# Patient Record
Sex: FEMALE | Race: WHITE | Employment: OTHER | URBAN - NONMETROPOLITAN AREA
[De-identification: names, ages, dates, MRNs, and addresses within clinical notes are randomized per-mention and may not be internally consistent; named-entity substitution may affect disease eponyms.]

---

## 2017-07-24 ENCOUNTER — OFFICE VISIT (OUTPATIENT)
Dept: INTERNAL MEDICINE CLINIC | Age: 77
End: 2017-07-24
Payer: COMMERCIAL

## 2017-07-24 ENCOUNTER — HOSPITAL ENCOUNTER (OUTPATIENT)
Age: 77
Discharge: HOME | End: 2017-07-24
Payer: COMMERCIAL

## 2017-07-24 VITALS
BODY MASS INDEX: 52.04 KG/M2 | WEIGHT: 282.8 LBS | SYSTOLIC BLOOD PRESSURE: 182 MMHG | DIASTOLIC BLOOD PRESSURE: 124 MMHG | HEART RATE: 72 BPM | HEIGHT: 62 IN

## 2017-07-24 DIAGNOSIS — Z79.4 TYPE 2 DIABETES MELLITUS WITH HYPERGLYCEMIA, WITH LONG-TERM CURRENT USE OF INSULIN (HCC): ICD-10-CM

## 2017-07-24 DIAGNOSIS — E11.65 TYPE 2 DIABETES MELLITUS WITH HYPERGLYCEMIA, WITH LONG-TERM CURRENT USE OF INSULIN (HCC): ICD-10-CM

## 2017-07-24 DIAGNOSIS — I25.10 CORONARY ARTERY DISEASE INVOLVING NATIVE CORONARY ARTERY OF NATIVE HEART WITHOUT ANGINA PECTORIS: ICD-10-CM

## 2017-07-24 DIAGNOSIS — N30.00 ACUTE CYSTITIS WITHOUT HEMATURIA: ICD-10-CM

## 2017-07-24 DIAGNOSIS — Z01.818 PREOP EXAM FOR INTERNAL MEDICINE: Primary | ICD-10-CM

## 2017-07-24 DIAGNOSIS — E66.01 MORBID OBESITY DUE TO EXCESS CALORIES (HCC): ICD-10-CM

## 2017-07-24 DIAGNOSIS — M17.11 PRIMARY OSTEOARTHRITIS OF RIGHT KNEE: ICD-10-CM

## 2017-07-24 DIAGNOSIS — E11.22 CKD STAGE 2 DUE TO TYPE 2 DIABETES MELLITUS (HCC): ICD-10-CM

## 2017-07-24 DIAGNOSIS — E78.00 PURE HYPERCHOLESTEROLEMIA: ICD-10-CM

## 2017-07-24 DIAGNOSIS — N18.2 CKD STAGE 2 DUE TO TYPE 2 DIABETES MELLITUS (HCC): ICD-10-CM

## 2017-07-24 DIAGNOSIS — I10 ESSENTIAL HYPERTENSION: ICD-10-CM

## 2017-07-24 LAB
AVERAGE GLUCOSE: 306 MG/DL (ref 70–126)
HBA1C MFR BLD: 12.2 % (ref 4.4–6.4)

## 2017-07-24 PROCEDURE — 36415 COLL VENOUS BLD VENIPUNCTURE: CPT

## 2017-07-24 PROCEDURE — 99244 OFF/OP CNSLTJ NEW/EST MOD 40: CPT | Performed by: INTERNAL MEDICINE

## 2017-07-24 PROCEDURE — 83036 HEMOGLOBIN GLYCOSYLATED A1C: CPT

## 2017-07-24 RX ORDER — SOLIFENACIN SUCCINATE 10 MG/1
10 TABLET, FILM COATED ORAL DAILY
COMMUNITY
End: 2017-11-10 | Stop reason: SDUPTHER

## 2017-07-24 RX ORDER — METOPROLOL SUCCINATE 100 MG/1
100 TABLET, EXTENDED RELEASE ORAL DAILY
COMMUNITY
End: 2017-10-30 | Stop reason: SDUPTHER

## 2017-07-24 RX ORDER — SULFAMETHOXAZOLE AND TRIMETHOPRIM 800; 160 MG/1; MG/1
1 TABLET ORAL 2 TIMES DAILY
Qty: 14 TABLET | Refills: 0 | Status: SHIPPED | OUTPATIENT
Start: 2017-07-24 | End: 2017-08-24 | Stop reason: ALTCHOICE

## 2017-07-24 ASSESSMENT — PATIENT HEALTH QUESTIONNAIRE - PHQ9
SUM OF ALL RESPONSES TO PHQ9 QUESTIONS 1 & 2: 0
1. LITTLE INTEREST OR PLEASURE IN DOING THINGS: 0
SUM OF ALL RESPONSES TO PHQ QUESTIONS 1-9: 0
2. FEELING DOWN, DEPRESSED OR HOPELESS: 0

## 2017-07-25 ENCOUNTER — TELEPHONE (OUTPATIENT)
Dept: INTERNAL MEDICINE CLINIC | Age: 77
End: 2017-07-25

## 2017-07-25 RX ORDER — LISINOPRIL 10 MG/1
10 TABLET ORAL DAILY
Qty: 30 TABLET | Refills: 3 | Status: SHIPPED | OUTPATIENT
Start: 2017-07-25 | End: 2017-08-07

## 2017-07-25 RX ORDER — BLOOD-GLUCOSE METER
KIT MISCELLANEOUS
Qty: 1 KIT | Refills: 0 | Status: SHIPPED | OUTPATIENT
Start: 2017-07-25 | End: 2018-03-16

## 2017-07-27 DIAGNOSIS — Z79.4 TYPE 2 DIABETES MELLITUS WITH HYPERGLYCEMIA, WITH LONG-TERM CURRENT USE OF INSULIN (HCC): Primary | ICD-10-CM

## 2017-07-27 DIAGNOSIS — E11.65 TYPE 2 DIABETES MELLITUS WITH HYPERGLYCEMIA, WITH LONG-TERM CURRENT USE OF INSULIN (HCC): Primary | ICD-10-CM

## 2017-07-27 RX ORDER — LANCETS 30 GAUGE
EACH MISCELLANEOUS
Qty: 100 EACH | Refills: 3 | Status: SHIPPED | OUTPATIENT
Start: 2017-07-27 | End: 2018-03-16

## 2017-07-27 RX ORDER — GLUCOSAMINE HCL/CHONDROITIN SU 500-400 MG
CAPSULE ORAL
Qty: 100 STRIP | Refills: 3 | Status: SHIPPED | OUTPATIENT
Start: 2017-07-27 | End: 2017-10-12 | Stop reason: SDUPTHER

## 2017-07-28 RX ORDER — IBUPROFEN 200 MG
1 TABLET ORAL DAILY
Qty: 100 EACH | Refills: 5 | Status: SHIPPED | OUTPATIENT
Start: 2017-07-28 | End: 2018-03-16

## 2017-08-07 ENCOUNTER — TELEPHONE (OUTPATIENT)
Dept: INTERNAL MEDICINE CLINIC | Age: 77
End: 2017-08-07

## 2017-08-07 RX ORDER — LISINOPRIL 40 MG/1
40 TABLET ORAL DAILY
Qty: 30 TABLET | Refills: 3 | OUTPATIENT
Start: 2017-08-07 | End: 2017-09-19 | Stop reason: SDUPTHER

## 2017-08-14 ENCOUNTER — TELEPHONE (OUTPATIENT)
Dept: INTERNAL MEDICINE CLINIC | Age: 77
End: 2017-08-14

## 2017-08-14 DIAGNOSIS — I10 ESSENTIAL HYPERTENSION: Primary | ICD-10-CM

## 2017-08-14 DIAGNOSIS — Z79.4 TYPE 2 DIABETES MELLITUS WITH HYPERGLYCEMIA, WITH LONG-TERM CURRENT USE OF INSULIN (HCC): ICD-10-CM

## 2017-08-14 DIAGNOSIS — E11.65 TYPE 2 DIABETES MELLITUS WITH HYPERGLYCEMIA, WITH LONG-TERM CURRENT USE OF INSULIN (HCC): ICD-10-CM

## 2017-08-14 DIAGNOSIS — I10 ESSENTIAL HYPERTENSION: ICD-10-CM

## 2017-08-14 RX ORDER — AMLODIPINE BESYLATE 5 MG/1
5 TABLET ORAL DAILY
Qty: 30 TABLET | Refills: 3 | Status: SHIPPED | OUTPATIENT
Start: 2017-08-14 | End: 2017-08-14 | Stop reason: SDUPTHER

## 2017-08-14 RX ORDER — AMLODIPINE BESYLATE 5 MG/1
5 TABLET ORAL DAILY
Qty: 30 TABLET | Refills: 3 | OUTPATIENT
Start: 2017-08-14 | End: 2017-09-01 | Stop reason: DRUGHIGH

## 2017-08-24 ENCOUNTER — OFFICE VISIT (OUTPATIENT)
Dept: INTERNAL MEDICINE CLINIC | Age: 77
End: 2017-08-24
Payer: COMMERCIAL

## 2017-08-24 VITALS
BODY MASS INDEX: 51.89 KG/M2 | WEIGHT: 282 LBS | SYSTOLIC BLOOD PRESSURE: 154 MMHG | HEART RATE: 60 BPM | HEIGHT: 62 IN | DIASTOLIC BLOOD PRESSURE: 84 MMHG

## 2017-08-24 DIAGNOSIS — N18.2 CKD STAGE 2 DUE TO TYPE 2 DIABETES MELLITUS (HCC): ICD-10-CM

## 2017-08-24 DIAGNOSIS — E11.65 TYPE 2 DIABETES MELLITUS WITH HYPERGLYCEMIA, WITH LONG-TERM CURRENT USE OF INSULIN (HCC): ICD-10-CM

## 2017-08-24 DIAGNOSIS — I10 ESSENTIAL HYPERTENSION: Primary | ICD-10-CM

## 2017-08-24 DIAGNOSIS — E66.01 MORBID OBESITY DUE TO EXCESS CALORIES (HCC): ICD-10-CM

## 2017-08-24 DIAGNOSIS — E78.2 MIXED HYPERLIPIDEMIA: ICD-10-CM

## 2017-08-24 DIAGNOSIS — Z23 NEED FOR PROPHYLACTIC VACCINATION AGAINST STREPTOCOCCUS PNEUMONIAE (PNEUMOCOCCUS): ICD-10-CM

## 2017-08-24 DIAGNOSIS — Z79.4 TYPE 2 DIABETES MELLITUS WITH HYPERGLYCEMIA, WITH LONG-TERM CURRENT USE OF INSULIN (HCC): ICD-10-CM

## 2017-08-24 DIAGNOSIS — E11.22 CKD STAGE 2 DUE TO TYPE 2 DIABETES MELLITUS (HCC): ICD-10-CM

## 2017-08-24 PROCEDURE — 99214 OFFICE O/P EST MOD 30 MIN: CPT | Performed by: INTERNAL MEDICINE

## 2017-08-24 PROCEDURE — 90670 PCV13 VACCINE IM: CPT | Performed by: INTERNAL MEDICINE

## 2017-08-24 PROCEDURE — 90471 IMMUNIZATION ADMIN: CPT | Performed by: INTERNAL MEDICINE

## 2017-08-31 ENCOUNTER — TELEPHONE (OUTPATIENT)
Dept: INTERNAL MEDICINE CLINIC | Age: 77
End: 2017-08-31

## 2017-09-01 RX ORDER — AMLODIPINE BESYLATE 10 MG/1
10 TABLET ORAL DAILY
Qty: 30 TABLET | Refills: 3 | Status: SHIPPED | OUTPATIENT
Start: 2017-09-01 | End: 2017-09-05 | Stop reason: SDUPTHER

## 2017-09-05 RX ORDER — AMLODIPINE BESYLATE 10 MG/1
10 TABLET ORAL DAILY
Qty: 30 TABLET | Refills: 3 | OUTPATIENT
Start: 2017-09-05 | End: 2017-09-19 | Stop reason: SDUPTHER

## 2017-09-19 ENCOUNTER — OFFICE VISIT (OUTPATIENT)
Dept: INTERNAL MEDICINE CLINIC | Age: 77
End: 2017-09-19
Payer: COMMERCIAL

## 2017-09-19 VITALS
SYSTOLIC BLOOD PRESSURE: 104 MMHG | DIASTOLIC BLOOD PRESSURE: 60 MMHG | WEIGHT: 266.6 LBS | BODY MASS INDEX: 49.06 KG/M2 | HEIGHT: 62 IN | HEART RATE: 64 BPM

## 2017-09-19 DIAGNOSIS — E11.65 TYPE 2 DIABETES MELLITUS WITH HYPERGLYCEMIA, WITH LONG-TERM CURRENT USE OF INSULIN (HCC): ICD-10-CM

## 2017-09-19 DIAGNOSIS — E78.00 PURE HYPERCHOLESTEROLEMIA: ICD-10-CM

## 2017-09-19 DIAGNOSIS — I10 ESSENTIAL HYPERTENSION: Primary | ICD-10-CM

## 2017-09-19 DIAGNOSIS — F02.80 EARLY ONSET ALZHEIMER'S DEMENTIA WITHOUT BEHAVIORAL DISTURBANCE (HCC): ICD-10-CM

## 2017-09-19 DIAGNOSIS — Z23 NEED FOR INFLUENZA VACCINATION: ICD-10-CM

## 2017-09-19 DIAGNOSIS — Z79.4 TYPE 2 DIABETES MELLITUS WITH HYPERGLYCEMIA, WITH LONG-TERM CURRENT USE OF INSULIN (HCC): ICD-10-CM

## 2017-09-19 DIAGNOSIS — G30.0 EARLY ONSET ALZHEIMER'S DEMENTIA WITHOUT BEHAVIORAL DISTURBANCE (HCC): ICD-10-CM

## 2017-09-19 DIAGNOSIS — Z87.440 HISTORY OF UTI: ICD-10-CM

## 2017-09-19 PROCEDURE — 90471 IMMUNIZATION ADMIN: CPT | Performed by: INTERNAL MEDICINE

## 2017-09-19 PROCEDURE — 90662 IIV NO PRSV INCREASED AG IM: CPT | Performed by: INTERNAL MEDICINE

## 2017-09-19 PROCEDURE — 99214 OFFICE O/P EST MOD 30 MIN: CPT | Performed by: INTERNAL MEDICINE

## 2017-09-19 RX ORDER — AMLODIPINE BESYLATE 10 MG/1
10 TABLET ORAL DAILY
Qty: 90 TABLET | Refills: 1 | Status: ON HOLD | OUTPATIENT
Start: 2017-09-19 | End: 2022-10-26

## 2017-09-19 RX ORDER — LISINOPRIL 40 MG/1
40 TABLET ORAL DAILY
Qty: 90 TABLET | Refills: 1 | Status: SHIPPED | OUTPATIENT
Start: 2017-09-19 | End: 2018-03-08 | Stop reason: ALTCHOICE

## 2017-09-19 RX ORDER — ATORVASTATIN CALCIUM 40 MG/1
40 TABLET, FILM COATED ORAL DAILY
Qty: 30 TABLET | Refills: 3 | Status: SHIPPED | OUTPATIENT
Start: 2017-09-19 | End: 2017-10-20 | Stop reason: SDUPTHER

## 2017-09-30 PROBLEM — G30.0 EARLY ONSET ALZHEIMER'S DEMENTIA WITHOUT BEHAVIORAL DISTURBANCE (HCC): Status: ACTIVE | Noted: 2017-09-30

## 2017-09-30 PROBLEM — F02.80 EARLY ONSET ALZHEIMER'S DEMENTIA WITHOUT BEHAVIORAL DISTURBANCE (HCC): Status: ACTIVE | Noted: 2017-09-30

## 2017-10-12 DIAGNOSIS — Z79.4 TYPE 2 DIABETES MELLITUS WITH HYPERGLYCEMIA, WITH LONG-TERM CURRENT USE OF INSULIN (HCC): ICD-10-CM

## 2017-10-12 DIAGNOSIS — E11.65 TYPE 2 DIABETES MELLITUS WITH HYPERGLYCEMIA, WITH LONG-TERM CURRENT USE OF INSULIN (HCC): ICD-10-CM

## 2017-10-13 RX ORDER — GLUCOSAMINE HCL/CHONDROITIN SU 500-400 MG
CAPSULE ORAL
Qty: 300 STRIP | Refills: 1 | Status: SHIPPED | OUTPATIENT
Start: 2017-10-13 | End: 2018-03-16

## 2017-10-20 DIAGNOSIS — E78.00 PURE HYPERCHOLESTEROLEMIA: ICD-10-CM

## 2017-10-21 RX ORDER — ATORVASTATIN CALCIUM 40 MG/1
40 TABLET, FILM COATED ORAL DAILY
Qty: 90 TABLET | Refills: 1 | Status: SHIPPED | OUTPATIENT
Start: 2017-10-21

## 2017-10-30 RX ORDER — METOPROLOL SUCCINATE 100 MG/1
100 TABLET, EXTENDED RELEASE ORAL DAILY
Qty: 90 TABLET | Refills: 1 | Status: SHIPPED | OUTPATIENT
Start: 2017-10-30 | End: 2018-07-17 | Stop reason: DRUGHIGH

## 2017-11-09 ENCOUNTER — TELEPHONE (OUTPATIENT)
Dept: INTERNAL MEDICINE CLINIC | Age: 77
End: 2017-11-09

## 2017-11-09 NOTE — TELEPHONE ENCOUNTER
Val Guillaume called wanting to know if pt needs to continue the Vesicare-originally ordered by Dr. Henrique Englihs. He states he doesn't feel it does what its suppose to do as she doesn't hold her urine. Will you refill this or recommend something different.

## 2017-11-10 RX ORDER — SOLIFENACIN SUCCINATE 10 MG/1
10 TABLET, FILM COATED ORAL DAILY
Qty: 90 TABLET | Refills: 1 | Status: ON HOLD | OUTPATIENT
Start: 2017-11-10 | End: 2022-10-26

## 2017-11-14 ENCOUNTER — TELEPHONE (OUTPATIENT)
Dept: INTERNAL MEDICINE CLINIC | Age: 77
End: 2017-11-14

## 2017-11-14 NOTE — TELEPHONE ENCOUNTER
informed of UTI and that Dr. Ciera Chow has ordered Bactrim DS to be taken 2 times per day. She is to take one today and then can take 1 in the am with just a sip of water before going to [facility]. Instructed him to bring this medicine along also tomorrow to [facility]. He verbalized understanding.

## 2017-11-14 NOTE — TELEPHONE ENCOUNTER
Constance Bonilla called and stated that Dr. Devora Jean Baptiste is good with pt just starting the tx for the UTI and to have pt bring the antibiotic with her when she comes in the am.

## 2017-11-16 RX ORDER — SULFAMETHOXAZOLE AND TRIMETHOPRIM 800; 160 MG/1; MG/1
1 TABLET ORAL 2 TIMES DAILY
Qty: 28 TABLET | Refills: 0 | OUTPATIENT
Start: 2017-11-16 | End: 2017-11-30

## 2017-12-04 ENCOUNTER — TELEPHONE (OUTPATIENT)
Dept: INTERNAL MEDICINE CLINIC | Age: 77
End: 2017-12-04

## 2017-12-04 NOTE — TELEPHONE ENCOUNTER
Discussed with Gloria Barboza - she advised patient's  to try to use just Tylenol for pain if possible.

## 2017-12-04 NOTE — TELEPHONE ENCOUNTER
Discussed this with Dr. Ciera Chow and informed  to maybe try to wean her off the oxycontin and use Tylenol not Excedrin for the pain and see how she does, but if she has another episode she needs to go to the ER to be evaluated. He verbalized understanding.

## 2017-12-07 RX ORDER — AMLODIPINE BESYLATE 10 MG/1
10 TABLET ORAL DAILY
Qty: 90 TABLET | Refills: 1 | OUTPATIENT
Start: 2017-12-07

## 2017-12-07 RX ORDER — AMLODIPINE BESYLATE 5 MG/1
5 TABLET ORAL DAILY
Qty: 90 TABLET | Refills: 1 | Status: CANCELLED | OUTPATIENT
Start: 2017-12-07

## 2017-12-07 NOTE — TELEPHONE ENCOUNTER
Verified with  that she is taking amlodipine 10 mg daily. He also mentions that she is feeling much better since stopping the pain med and is using Tylenol 500 mg 3 times daily. She is doing therapy again.

## 2017-12-13 ENCOUNTER — TELEPHONE (OUTPATIENT)
Dept: INTERNAL MEDICINE CLINIC | Age: 77
End: 2017-12-13

## 2017-12-14 ENCOUNTER — OFFICE VISIT (OUTPATIENT)
Dept: INTERNAL MEDICINE CLINIC | Age: 77
End: 2017-12-14
Payer: COMMERCIAL

## 2017-12-14 ENCOUNTER — HOSPITAL ENCOUNTER (OUTPATIENT)
Age: 77
Discharge: HOME | End: 2017-12-14
Payer: COMMERCIAL

## 2017-12-14 VITALS
HEART RATE: 70 BPM | DIASTOLIC BLOOD PRESSURE: 68 MMHG | HEIGHT: 62 IN | SYSTOLIC BLOOD PRESSURE: 130 MMHG | BODY MASS INDEX: 45.82 KG/M2 | WEIGHT: 249 LBS

## 2017-12-14 DIAGNOSIS — E11.65 TYPE 2 DIABETES MELLITUS WITH HYPERGLYCEMIA, WITH LONG-TERM CURRENT USE OF INSULIN (HCC): ICD-10-CM

## 2017-12-14 DIAGNOSIS — I10 ESSENTIAL HYPERTENSION: ICD-10-CM

## 2017-12-14 DIAGNOSIS — Z79.4 TYPE 2 DIABETES MELLITUS WITH HYPERGLYCEMIA, WITH LONG-TERM CURRENT USE OF INSULIN (HCC): ICD-10-CM

## 2017-12-14 DIAGNOSIS — M25.561 ACUTE PAIN OF RIGHT KNEE: Primary | ICD-10-CM

## 2017-12-14 DIAGNOSIS — E78.00 PURE HYPERCHOLESTEROLEMIA: ICD-10-CM

## 2017-12-14 DIAGNOSIS — R82.90 FOUL SMELLING URINE: ICD-10-CM

## 2017-12-14 DIAGNOSIS — F32.89 OTHER DEPRESSION: ICD-10-CM

## 2017-12-14 LAB
ALBUMIN SERPL-MCNC: 4.3 G/DL (ref 3.5–5.1)
ALP BLD-CCNC: 68 U/L (ref 38–126)
ALT SERPL-CCNC: 12 U/L (ref 11–66)
ANION GAP SERPL CALCULATED.3IONS-SCNC: 16 MEQ/L (ref 8–16)
AST SERPL-CCNC: 13 U/L (ref 5–40)
AVERAGE GLUCOSE: 120 MG/DL (ref 70–126)
BILIRUB SERPL-MCNC: 0.9 MG/DL (ref 0.3–1.2)
BUN BLDV-MCNC: 18 MG/DL (ref 7–22)
CALCIUM SERPL-MCNC: 9.6 MG/DL (ref 8.5–10.5)
CHLORIDE BLD-SCNC: 104 MEQ/L (ref 98–111)
CO2: 23 MEQ/L (ref 23–33)
CREAT SERPL-MCNC: 1 MG/DL (ref 0.4–1.2)
GFR SERPL CREATININE-BSD FRML MDRD: 54 ML/MIN/1.73M2
GLUCOSE BLD-MCNC: 173 MG/DL (ref 70–108)
HBA1C MFR BLD: 6 % (ref 4.4–6.4)
LDL CHOLESTEROL DIRECT: 70.13 MG/DL
POTASSIUM SERPL-SCNC: 4.4 MEQ/L (ref 3.5–5.2)
SODIUM BLD-SCNC: 143 MEQ/L (ref 135–145)
TOTAL PROTEIN: 7.3 G/DL (ref 6.1–8)

## 2017-12-14 PROCEDURE — 36415 COLL VENOUS BLD VENIPUNCTURE: CPT

## 2017-12-14 PROCEDURE — 83036 HEMOGLOBIN GLYCOSYLATED A1C: CPT

## 2017-12-14 PROCEDURE — 83721 ASSAY OF BLOOD LIPOPROTEIN: CPT

## 2017-12-14 PROCEDURE — 80053 COMPREHEN METABOLIC PANEL: CPT

## 2017-12-14 PROCEDURE — 99213 OFFICE O/P EST LOW 20 MIN: CPT | Performed by: INTERNAL MEDICINE

## 2017-12-14 RX ORDER — ACETAMINOPHEN 500 MG
500 TABLET ORAL EVERY 6 HOURS PRN
Status: ON HOLD | COMMUNITY
End: 2022-10-26

## 2017-12-14 RX ORDER — SERTRALINE HYDROCHLORIDE 25 MG/1
25 TABLET, FILM COATED ORAL DAILY
Qty: 30 TABLET | Refills: 3 | Status: SHIPPED | OUTPATIENT
Start: 2017-12-14 | End: 2018-01-06

## 2017-12-14 NOTE — PROGRESS NOTES
1940    Chief Complaint   Patient presents with    Other     pt thinks she is here for check up- called in yesterday saying pt just wants to lay in bed all day and this week she begged him not to take her to therapy and cried. Pt is a 78 y.o. female had right total knee arthroplasty with Dr. Sathish Rosales. She is at home with  and has baseline dementia and very difficult for  to deal with as she does not like to be told what to do. She is here today as pain issues and  reports she has been refusing to go to PT - consider depression, pain. She is having side effects with oxycodone and changed to Tylenol but only take 3 pills a day - increase to 2 pills every 6 hours and see if better pain control. She has lack of motivation, states she is depressed. Also with dementia. She is very aggitated with  \"bossing her around\". Not making it to PT, getting out of bed is a struggle. Will try low dose Zoloft. Encouraged patient to view  as her assistant/helper, he only wants what is best for her. She will give him a nasty look whenever he suggests things at the visit, can only imagine how difficult he has it at home    Strong smell of urine in room - check UA to rule out UTI. She has had 2 UTI's in the past several months. Past Medical History:   Diagnosis Date    Arthritis     CAD (coronary artery disease)     s/p MI, CABG 2 vessels 1994    CKD stage 2 due to type 2 diabetes mellitus (HCC)     secondary to obesity, aging, and DM    DM (diabetes mellitus) ([address]     DM retinopathy ([address]     mild    HTN (hypertension)     variable, improved by chaning her medication raya.     Hyperlipidemia 1994    Morbid obesity ([address]     OAB (overactive bladder)     chronic episodes of incontinence per     UTI (urinary tract infection) 07/2017    preop       Past Surgical History:   Procedure Laterality Date    CORONARY ARTERY BYPASS GRAFT  1994    s/p two-vessel  HYSTERECTOMY  1994    KNEE ARTHROSCOPY Right ? Dr. Oscar Arriola    ovarian       Current Outpatient Prescriptions   Medication Sig Dispense Refill    acetaminophen (TYLENOL) 500 MG tablet Take 500 mg by mouth every 6 hours as needed for Pain      sertraline (ZOLOFT) 25 MG tablet Take 1 tablet by mouth daily 30 tablet 3    solifenacin (VESICARE) 10 MG tablet Take 1 tablet by mouth daily 90 tablet 1    metoprolol succinate (TOPROL XL) 100 MG extended release tablet Take 1 tablet by mouth daily 90 tablet 1    atorvastatin (LIPITOR) 40 MG tablet Take 1 tablet by mouth daily 90 tablet 1    Glucose Blood (BLOOD GLUCOSE TEST STRIPS) STRP One Touch Ultra Mini - Test blood glucose 3 times per day and as needed. Dx E11.65 300 strip 1    Dulaglutide (TRULICITY) 3.06 IV/5.1TX SOPN Inject 0.75 mg into the skin once a week 4 Pen 5    lisinopril (PRINIVIL;ZESTRIL) 40 MG tablet Take 1 tablet by mouth daily 90 tablet 1    metFORMIN (GLUCOPHAGE) 500 MG tablet Take 1 tablet by mouth 2 times daily (with meals) 180 tablet 1    amLODIPine (NORVASC) 10 MG tablet Take 1 tablet by mouth daily 90 tablet 1    INSULIN SYRINGE 1CC/29G 29G X 1/2\" 1 ML MISC 1 each by Does not apply route daily 100 each 5    Lancets MISC Ultra Mini One Touch - Test glucose 3 times per day and as needed. DX E11.65 100 each 3    Blood Glucose Monitoring Suppl (ONE TOUCH ULTRA MINI) w/Device KIT Test blood sugars three times daily . DX:E11.9 patient is on insulin 1 kit 0    insulin detemir (LEVEMIR) 100 UNIT/ML injection vial Inject 45 Units into the skin nightly       aspirin 325 MG EC tablet Take 325 mg by mouth daily        No current facility-administered medications for this visit. No Known Allergies    Social History     Social History    Marital status:      Spouse name: N/A    Number of children: N/A    Years of education: N/A     Occupational History    Not on file.

## 2017-12-15 ENCOUNTER — HOSPITAL ENCOUNTER (OUTPATIENT)
Age: 77
Discharge: HOME | End: 2017-12-15
Payer: COMMERCIAL

## 2017-12-15 LAB
BACTERIA: ABNORMAL /HPF
BILIRUBIN URINE: ABNORMAL
BLOOD, URINE: ABNORMAL
CASTS 2: ABNORMAL /LPF
CASTS UA: ABNORMAL /LPF
CHARACTER, URINE: ABNORMAL
COLOR: ABNORMAL
CREATININE, URINE: 230.6 MG/DL
CRYSTALS, UA: ABNORMAL
EPITHELIAL CELLS, UA: ABNORMAL /HPF
GLUCOSE URINE: NEGATIVE MG/DL
ICTOTEST: NEGATIVE
KETONES, URINE: NEGATIVE
LEUKOCYTE ESTERASE, URINE: ABNORMAL
MICROALBUMIN UR-MCNC: 20.57 MG/DL
MICROALBUMIN/CREAT UR-RTO: 89 MG/G (ref 0–30)
MISCELLANEOUS 2: ABNORMAL
NITRITE, URINE: POSITIVE
PH UA: 5
PROTEIN UA: 100
RBC URINE: ABNORMAL /HPF
RENAL EPITHELIAL, UA: ABNORMAL
SPECIFIC GRAVITY, URINE: 1.03 (ref 1–1.03)
UROBILINOGEN, URINE: 0.2 EU/DL
WBC UA: ABNORMAL /HPF
YEAST: ABNORMAL

## 2017-12-15 PROCEDURE — 82043 UR ALBUMIN QUANTITATIVE: CPT

## 2017-12-15 PROCEDURE — 81001 URINALYSIS AUTO W/SCOPE: CPT

## 2017-12-15 PROCEDURE — 87086 URINE CULTURE/COLONY COUNT: CPT

## 2017-12-15 PROCEDURE — 87184 SC STD DISK METHOD PER PLATE: CPT

## 2017-12-15 PROCEDURE — 87186 SC STD MICRODIL/AGAR DIL: CPT

## 2017-12-15 PROCEDURE — 87077 CULTURE AEROBIC IDENTIFY: CPT

## 2017-12-17 LAB
ORGANISM: ABNORMAL
ORGANISM: ABNORMAL
URINE CULTURE REFLEX: ABNORMAL
URINE CULTURE REFLEX: ABNORMAL

## 2017-12-18 ENCOUNTER — TELEPHONE (OUTPATIENT)
Dept: INTERNAL MEDICINE CLINIC | Age: 77
End: 2017-12-18

## 2017-12-18 NOTE — TELEPHONE ENCOUNTER
----- Message from Effie Huffman MD sent at 12/17/2017  8:32 AM EST -----  Let  know labs are normal -except UA positive for infection - should have culture back by Monday to start antibiotic. We should think about Urology consult with 3 UTIs in 4 months.

## 2017-12-19 DIAGNOSIS — N39.0 FREQUENT UTI: Primary | ICD-10-CM

## 2017-12-19 NOTE — TELEPHONE ENCOUNTER
re-entreated that he does have a 14 day supply of the Bactrim. Instructed him that Dr. Alisson Gomez does want a repeat urine culture when finished with the antibiotic and he verbalized understanding. Lab order mailed to pt.

## 2017-12-19 NOTE — TELEPHONE ENCOUNTER
returned call and informed of UTI again and she should start to take the antibiotic again. Also Dr. Mikki Brown wants her to think about seeing Urology due to having had 3 UTI's in last 4 months. He stated he will talk to Leidy Lion and get back with us regarding the consult.

## 2017-12-21 DIAGNOSIS — N39.0 FREQUENT UTI: Primary | ICD-10-CM

## 2018-01-04 ENCOUNTER — OFFICE VISIT (OUTPATIENT)
Dept: INTERNAL MEDICINE CLINIC | Age: 78
End: 2018-01-04
Payer: COMMERCIAL

## 2018-01-04 ENCOUNTER — HOSPITAL ENCOUNTER (OUTPATIENT)
Age: 78
Discharge: HOME | End: 2018-01-04
Payer: COMMERCIAL

## 2018-01-04 VITALS
WEIGHT: 245.5 LBS | HEIGHT: 61 IN | SYSTOLIC BLOOD PRESSURE: 120 MMHG | HEART RATE: 68 BPM | DIASTOLIC BLOOD PRESSURE: 64 MMHG | BODY MASS INDEX: 46.35 KG/M2

## 2018-01-04 DIAGNOSIS — E11.29 TYPE 2 DIABETES MELLITUS WITH MICROALBUMINURIA, WITH LONG-TERM CURRENT USE OF INSULIN (HCC): ICD-10-CM

## 2018-01-04 DIAGNOSIS — E78.00 PURE HYPERCHOLESTEROLEMIA: ICD-10-CM

## 2018-01-04 DIAGNOSIS — F02.80 EARLY ONSET ALZHEIMER'S DEMENTIA WITHOUT BEHAVIORAL DISTURBANCE (HCC): ICD-10-CM

## 2018-01-04 DIAGNOSIS — G30.0 EARLY ONSET ALZHEIMER'S DEMENTIA WITHOUT BEHAVIORAL DISTURBANCE (HCC): ICD-10-CM

## 2018-01-04 DIAGNOSIS — Z79.4 TYPE 2 DIABETES MELLITUS WITH MICROALBUMINURIA, WITH LONG-TERM CURRENT USE OF INSULIN (HCC): ICD-10-CM

## 2018-01-04 DIAGNOSIS — N39.0 FREQUENT UTI: ICD-10-CM

## 2018-01-04 DIAGNOSIS — R80.9 TYPE 2 DIABETES MELLITUS WITH MICROALBUMINURIA, WITH LONG-TERM CURRENT USE OF INSULIN (HCC): ICD-10-CM

## 2018-01-04 DIAGNOSIS — F32.4 MAJOR DEPRESSIVE DISORDER WITH SINGLE EPISODE, IN PARTIAL REMISSION (HCC): Primary | ICD-10-CM

## 2018-01-04 DIAGNOSIS — I10 ESSENTIAL HYPERTENSION: ICD-10-CM

## 2018-01-04 PROCEDURE — 99214 OFFICE O/P EST MOD 30 MIN: CPT | Performed by: INTERNAL MEDICINE

## 2018-01-04 RX ORDER — SULFAMETHOXAZOLE AND TRIMETHOPRIM 800; 160 MG/1; MG/1
1 TABLET ORAL 2 TIMES DAILY
COMMUNITY
End: 2018-02-23 | Stop reason: ALTCHOICE

## 2018-01-04 NOTE — PROGRESS NOTES
unremarkable - mild to moderate cardiac risk for surgery. Continue beta blocker.     Dementia - she is very social but when asked pointed questions about her health - she is unable to answer. Her  is making sure that she is taking medication now. At next visit consider Aricept. Past Medical History:   Diagnosis Date    Arthritis     CAD (coronary artery disease)     s/p MI, CABG 2 vessels 1994    CKD stage 2 due to type 2 diabetes mellitus (HCC)     secondary to obesity, aging, and DM    [facility]) [facility]     DM retinopathy ([facility]     mild    HTN (hypertension)     variable, improved by chaning her medication raya.  Hyperlipidemia 1994    Morbid obesity ([facility]     OAB (overactive bladder)     chronic episodes of incontinence per     UTI (urinary tract infection) 07/2017    preop       Past Surgical History:   Procedure Laterality Date    CORONARY ARTERY BYPASS GRAFT  1994    s/p two-vessel    HYSTERECTOMY  1994    KNEE ARTHROSCOPY Right ? Dr. Erica Wagoner    ovarian       Current Outpatient Prescriptions   Medication Sig Dispense Refill    sertraline (ZOLOFT) 50 MG tablet Take 1 tablet by mouth daily 30 tablet 3    sulfamethoxazole-trimethoprim (BACTRIM DS;SEPTRA DS) 800-160 MG per tablet Take 1 tablet by mouth 2 times daily      acetaminophen (TYLENOL) 500 MG tablet Take 500 mg by mouth every 6 hours as needed for Pain      solifenacin (VESICARE) 10 MG tablet Take 1 tablet by mouth daily 90 tablet 1    metoprolol succinate (TOPROL XL) 100 MG extended release tablet Take 1 tablet by mouth daily 90 tablet 1    atorvastatin (LIPITOR) 40 MG tablet Take 1 tablet by mouth daily 90 tablet 1    Glucose Blood (BLOOD GLUCOSE TEST STRIPS) STRP One Touch Ultra Mini - Test blood glucose 3 times per day and as needed.  Dx E11.65 300 strip 1    Dulaglutide (TRULICITY) 2.32 GT/6.6AY SOPN Inject 0.75 mg into the skin once a week 4 exertion  Gastrointestinal ROS: no abdominal pain, change in bowel habits, or black or bloody stools  Genito-Urinary ROS: no dysuria, trouble voiding, or hematuria  Musculoskeletal ROS: negative for - muscle pain or muscular weakness, positive right knee pain  Neurological ROS: negative for - headaches, numbness/tingling, seizures or weakness  Dermatological ROS: negative for - rash or skin lesion changes    Blood pressure 120/64, pulse 68, height 5' 1\" (1.549 m), weight 245 lb 8 oz (111.4 kg). Physical Examination: General appearance -alert, well appearing, and in no distress  Mental status - alert, oriented to person, place, and time  Neck - supple, no significant adenopathy  Chest - clear to auscultation, no wheezes, rales or rhonchi, symmetric air entry  Heart - normal rate, regular rhythm, normal S1, S2, no murmurs, rubs, clicks or gallops  Abdomen -soft, nontender, non-distended  Extremities - peripheral pulses normal, +1-2 edema to mid shin bilaterally, no clubbing or cyanosis  Skin - warm and dry    Diagnostic data: Reviewed lab results from 12/2017 with patient and . Assessment and Plan:    1. Major depressive disorder with single episode, in partial remission (HCC)  sertraline (ZOLOFT) 50 MG tablet   2. Essential hypertension     3. Type 2 diabetes mellitus with microalbuminuria, with long-term current use of insulin ([address]     4. Pure hypercholesterolemia     5. Frequent UTI     6. Early onset Alzheimer's dementia without behavioral disturbance       Depression - increase Zoloft to 50 mg daily and monitor. Hypertension - BP acceptable - continue metoprolol, lisinopril, Norvasc. Diabetes - controlled HgA1c. Need to continue to monitor closely. On max dose lisinopril for elevated microalbumin, mild DM retinopathy. Hypercholesterolemia - at goal on Lipitor - continue. Frequent UTIs, chronic urinary incontinence - need to send to Urology as multiple UTI's in setting of new TKA.

## 2018-01-06 PROBLEM — F32.4 MAJOR DEPRESSIVE DISORDER WITH SINGLE EPISODE, IN PARTIAL REMISSION (HCC): Status: ACTIVE | Noted: 2018-01-06

## 2018-01-16 ENCOUNTER — TELEPHONE (OUTPATIENT)
Dept: INTERNAL MEDICINE CLINIC | Age: 78
End: 2018-01-16

## 2018-01-17 RX ORDER — CIPROFLOXACIN 500 MG/1
500 TABLET, FILM COATED ORAL 2 TIMES DAILY
Qty: 28 TABLET | Refills: 0 | OUTPATIENT
Start: 2018-01-17 | End: 2018-01-31

## 2018-01-31 ENCOUNTER — OFFICE VISIT (OUTPATIENT)
Dept: UROLOGY | Age: 78
End: 2018-01-31
Payer: COMMERCIAL

## 2018-01-31 VITALS
SYSTOLIC BLOOD PRESSURE: 114 MMHG | WEIGHT: 250 LBS | BODY MASS INDEX: 47.2 KG/M2 | DIASTOLIC BLOOD PRESSURE: 64 MMHG | HEIGHT: 61 IN

## 2018-01-31 DIAGNOSIS — N39.0 RECURRENT UTI: Primary | ICD-10-CM

## 2018-01-31 LAB — POST VOID RESIDUAL (PVR): 27 ML

## 2018-01-31 PROCEDURE — 51798 US URINE CAPACITY MEASURE: CPT | Performed by: NURSE PRACTITIONER

## 2018-01-31 PROCEDURE — 99203 OFFICE O/P NEW LOW 30 MIN: CPT | Performed by: NURSE PRACTITIONER

## 2018-01-31 RX ORDER — METHENAMINE HIPPURATE 1000 MG/1
1 TABLET ORAL 2 TIMES DAILY WITH MEALS
Qty: 60 TABLET | Refills: 5
Start: 2018-01-31

## 2018-01-31 RX ORDER — CALCIUM CARBONATE 300MG(750)
400 TABLET,CHEWABLE ORAL 2 TIMES DAILY
COMMUNITY
End: 2018-03-08

## 2018-02-03 LAB
BILIRUBIN URINE: ABNORMAL MG/DL
BLOOD, URINE: NEGATIVE
CLARITY: ABNORMAL
COLOR: YELLOW
GLUCOSE URINE: NEGATIVE
KETONES, URINE: NEGATIVE
LEUKOCYTE ESTERASE, URINE: ABNORMAL
NITRITE, URINE: NEGATIVE
PH UA: 5 (ref 4.5–8)
PROTEIN UA: NEGATIVE
SPECIFIC GRAVITY UA: 1.02 (ref 1–1.03)
UROBILINOGEN, URINE: NORMAL

## 2018-02-06 ENCOUNTER — TELEPHONE (OUTPATIENT)
Dept: UROLOGY | Age: 78
End: 2018-02-06

## 2018-02-06 NOTE — TELEPHONE ENCOUNTER
I called and spoke with [facility], nurse at [facility] and she stated that the patient was not prescribed any antibiotic by their physicians.

## 2018-02-06 NOTE — TELEPHONE ENCOUNTER
I called and spoke with Omar(on [facility]) and notified him that his wife's urine culture was negative and no antibiotic is needed. Patient is to still have the CT scan as previously ordered. Patients  voiced understanding.

## 2018-02-06 NOTE — TELEPHONE ENCOUNTER
Patient's  called asking if we have received the urine results from [facility]. He is wanting to know if the patient should be on antibiotics and if she will still need to proceed with the CT scan scheduled on 02/08/18? I called [facility] for urine results, they are to fax us the report. Please advise. Thank you.

## 2018-02-08 ENCOUNTER — TELEPHONE (OUTPATIENT)
Dept: UROLOGY | Age: 78
End: 2018-02-08

## 2018-02-08 ENCOUNTER — HOSPITAL ENCOUNTER (OUTPATIENT)
Dept: CT IMAGING | Age: 78
Discharge: HOME | End: 2018-02-08
Payer: MEDICARE

## 2018-02-08 DIAGNOSIS — N28.89 LEFT RENAL MASS: Primary | ICD-10-CM

## 2018-02-08 DIAGNOSIS — N39.0 RECURRENT UTI: ICD-10-CM

## 2018-02-08 DIAGNOSIS — N20.0 KIDNEY STONE ON LEFT SIDE: ICD-10-CM

## 2018-02-08 PROCEDURE — 74176 CT ABD & PELVIS W/O CONTRAST: CPT

## 2018-02-08 NOTE — TELEPHONE ENCOUNTER
CT reviewed. Patient has staghorn left stone reaching into left UPJ. She also has mass on left kidney which needs further evaluated. Left kidney has some atrophy and is likely not functioning much. Called and discussed this with . We will need to schedule a MRI abdomen with and without contrast.    If mass is suspicious for malignancy we may need to proceed with left nephrectomy due to large stone, recurrent UTI, and poorly functioning kidney. I will call her  with MRI results.

## 2018-02-12 NOTE — TELEPHONE ENCOUNTER
Patient's  calling and wanting know the status of getting the MRI scheduled. Could you please contact him regarding this? Thank you.

## 2018-02-13 ENCOUNTER — TELEPHONE (OUTPATIENT)
Dept: UROLOGY | Age: 78
End: 2018-02-13

## 2018-02-17 ENCOUNTER — HOSPITAL ENCOUNTER (OUTPATIENT)
Dept: MRI IMAGING | Age: 78
Discharge: HOME | End: 2018-02-17
Payer: COMMERCIAL

## 2018-02-17 DIAGNOSIS — N28.89 LEFT RENAL MASS: ICD-10-CM

## 2018-02-17 DIAGNOSIS — N20.0 KIDNEY STONE ON LEFT SIDE: ICD-10-CM

## 2018-02-17 LAB — POC CREATININE WHOLE BLOOD: 1.1 MG/DL (ref 0.5–1.2)

## 2018-02-17 PROCEDURE — 82565 ASSAY OF CREATININE: CPT

## 2018-02-17 PROCEDURE — 6360000004 HC RX CONTRAST MEDICATION: Performed by: NURSE PRACTITIONER

## 2018-02-17 PROCEDURE — A9579 GAD-BASE MR CONTRAST NOS,1ML: HCPCS | Performed by: NURSE PRACTITIONER

## 2018-02-17 PROCEDURE — 74183 MRI ABD W/O CNTR FLWD CNTR: CPT

## 2018-02-17 RX ADMIN — GADOTERIDOL 20 ML: 279.3 INJECTION, SOLUTION INTRAVENOUS at 09:27

## 2018-02-18 ENCOUNTER — TELEPHONE (OUTPATIENT)
Dept: UROLOGY | Age: 78
End: 2018-02-18

## 2018-02-23 ENCOUNTER — OFFICE VISIT (OUTPATIENT)
Dept: UROLOGY | Age: 78
End: 2018-02-23
Payer: COMMERCIAL

## 2018-02-23 VITALS
SYSTOLIC BLOOD PRESSURE: 108 MMHG | BODY MASS INDEX: 47.22 KG/M2 | DIASTOLIC BLOOD PRESSURE: 52 MMHG | HEIGHT: 61 IN | WEIGHT: 250.1 LBS

## 2018-02-23 DIAGNOSIS — N28.89 RENAL MASS: Primary | ICD-10-CM

## 2018-02-23 PROCEDURE — 99214 OFFICE O/P EST MOD 30 MIN: CPT | Performed by: UROLOGY

## 2018-02-23 RX ORDER — CEPHALEXIN 500 MG/1
500 CAPSULE ORAL 4 TIMES DAILY
COMMUNITY
End: 2018-03-08 | Stop reason: ALTCHOICE

## 2018-02-23 RX ORDER — HYDRALAZINE HYDROCHLORIDE 50 MG/1
50 TABLET, FILM COATED ORAL 2 TIMES DAILY
COMMUNITY
End: 2018-03-08 | Stop reason: SDUPTHER

## 2018-02-23 ASSESSMENT — ENCOUNTER SYMPTOMS
NAUSEA: 0
FACIAL SWELLING: 0
SHORTNESS OF BREATH: 0
EYE PAIN: 0
CHEST TIGHTNESS: 0
EYE REDNESS: 0
COLOR CHANGE: 0
ABDOMINAL PAIN: 0
BACK PAIN: 0

## 2018-02-23 NOTE — PROGRESS NOTES
Subjective:      Patient ID: Ramsey Martinez 78 y.o. female 1940    Chief Complaint   Patient presents with    Follow-up     discuss possible Left nephrectomy-MRI Prior       Other   This is a new (renal mass, atrophic kidney) problem. The current episode started 1 to 4 weeks ago. The problem occurs constantly. The problem has been unchanged. Pertinent negatives include no abdominal pain, chest pain, chills, fever, headaches, joint swelling, nausea or rash. Nothing aggravates the symptoms. She has tried nothing for the symptoms. The treatment provided no relief. Past Medical History:   Diagnosis Date    Arthritis     CAD (coronary artery disease)     s/p MI, CABG 2 vessels 1994    CKD stage 2 due to type 2 diabetes mellitus (HCC)     secondary to obesity, aging, and DM    DM (diabetes mellitus) ([address]     DM retinopathy ([facility]     mild    HTN (hypertension)     variable, improved by chaning her medication raya.  Hyperlipidemia 1994    Morbid obesity ([facility]     OAB (overactive bladder)     chronic episodes of incontinence per     UTI (urinary tract infection) 07/2017    preop       Social History     Social History    Marital status:      Spouse name: N/A    Number of children: N/A    Years of education: N/A     Occupational History    Not on file.      Social History Main Topics    Smoking status: Never Smoker    Smokeless tobacco: Never Used    Alcohol use No    Drug use: No    Sexual activity: Not on file     Other Topics Concern    Not on file     Social History Narrative    No narrative on file       Family History   Problem Relation Age of Onset    Arthritis Mother     Coronary Art Dis Mother     Diabetes Mother     High Blood Pressure Mother     Other Mother      TIA's    Cancer Father     Stroke Father     Cancer Paternal Grandmother        Past Surgical History:   Procedure Laterality Date    CORONARY ARTERY BYPASS GRAFT  1995    s/p two-vessel [place] Constitutional: Negative for chills and fever. HENT: Negative for ear pain and facial swelling. Eyes: Negative for pain and redness. Respiratory: Negative for chest tightness and shortness of breath. Cardiovascular: Positive for leg swelling. Negative for chest pain. Gastrointestinal: Negative for abdominal pain and nausea. Endocrine: Negative for cold intolerance and heat intolerance. Genitourinary: Positive for frequency. Negative for difficulty urinating and urgency. Musculoskeletal: Negative for back pain and joint swelling. Skin: Negative for color change and rash. Allergic/Immunologic: Negative for environmental allergies and food allergies. Neurological: Negative for dizziness and headaches. Hematological: Bruises/bleeds easily. BP (!) 108/52   Ht 5' 1\" (1.549 m)   Wt 250 lb 1.6 oz (113.4 kg)   BMI 47.26 kg/m²     Objective:   Physical Exam   Constitutional: She is oriented to person, place, and time. Vital signs are normal. She appears well-developed and well-nourished. No distress. HENT:   Head: Normocephalic and atraumatic. Mouth/Throat: Oropharynx is clear and moist and mucous membranes are normal. No oropharyngeal exudate. Eyes: EOM are normal. Pupils are equal, round, and reactive to light. Right eye exhibits no discharge. Left eye exhibits no discharge. No scleral icterus. Neck: Trachea normal. No JVD present. No tracheal deviation present. No thyroid mass present. Cardiovascular: Normal rate and regular rhythm. Pulmonary/Chest: Effort normal and breath sounds normal. No respiratory distress. She has no wheezes. Abdominal: Soft. She exhibits no distension. There is no tenderness. There is no rebound and no CVA tenderness. Genitourinary: Pelvic exam was performed with patient supine. Musculoskeletal: She exhibits no edema or tenderness. Lymphadenopathy:        Right: No supraclavicular adenopathy present.         Left: No supraclavicular adenopathy present. Neurological: She is alert and oriented to person, place, and time. Skin: Skin is warm and dry. She is not diaphoretic. Psychiatric: She has a normal mood and affect. Her behavior is normal.   Nursing note and vitals reviewed. Labs    No results found for this visit on 02/23/18. Lab Results   Component Value Date    CREATININE 1.21 01/20/2018    BUN <5 (L) 01/20/2018     01/20/2018    K 5.4 (H) 01/20/2018     01/20/2018    CO2 18 (L) 01/20/2018     Radiology  The patient has had an abdominal MRI that I have reviewed along with its accompanying report. The study demonstrates a large tumor, central in a very atrophic left kidney. Impression   1. The left kidney is asymmetrically small and atrophic compared to the right. 2. There is an enhancing 4.7 cm mass along the lateral margin of the midpole of the left kidney. A renal cell carcinoma cannot be excluded. There is no extension into the left perinephric space or extension into the left renal vein. There is no    pathologically enlarged lymphadenopathy. 3. There is a staghorn calculus within the left renal pelvis. There is no pelvocaliectasis. 4. Additional findings as described in the body report. Assessment:      1. Renal mass         Ms. Martinez presents today in consultation for Renal mass [N28.89]. She has a left renal mass and she has a left kidney that is very atrophic. With the size of the kidney, the tumor encompasses the majority of the kidney bulk. I believe that a partial nephrectomy is not indicated as there would be very little normal parenchyma we would be preserving. She also has a staghorn in the same kidney and this would required additional surgery if we were to try to preserve things. With all of this I have recommended a RAL left radial nephrectomy. We discussed this today at length. She understands and asked appropriate questions.     I have recommended RAL left radical nephrectomy. We will schedule this in the near future. I have reviewed all notes sent along with this referral including notes from a recent visit to her primary care provider. These records demonstrated the following past medical history:  Past Medical History:   Diagnosis Date    Arthritis     CAD (coronary artery disease)     s/p MI, CABG 2 vessels 1994    CKD stage 2 due to type 2 diabetes mellitus ([facility]     secondary to obesity, aging, and DM    DM (diabetes mellitus) ([facility]     DM retinopathy ([facility]     mild    HTN (hypertension)     variable, improved by chaning her medication raya.  Hyperlipidemia 1994    Morbid obesity ([facility]     OAB (overactive bladder)     chronic episodes of incontinence per     UTI (urinary tract infection) 07/2017    preop          Plan: We will schedule RAL left radical nephrectomy. I described the procedure in detail and also described the associated risks and benefits at length. We discussed possible alternative therapies. We discussed the risks and benefits of not undergoing therapy. Angel Louis understands these risks and benefits and desires to proceed. she will be scheduled for the procedure in the very near future.

## 2018-03-05 ENCOUNTER — TELEPHONE (OUTPATIENT)
Dept: UROLOGY | Age: 78
End: 2018-03-05

## 2018-03-05 ENCOUNTER — TELEPHONE (OUTPATIENT)
Dept: INTERNAL MEDICINE CLINIC | Age: 78
End: 2018-03-05

## 2018-03-06 NOTE — TELEPHONE ENCOUNTER
Notified patient that Dr. Vishal Thornton okay to follow for home health . Dr. Vishal Thornton needs to see the patient within the next 2 weeks. Nurse states that patient is being discharged tomorrow and [facility] will call and schedule appointment.

## 2018-03-08 ENCOUNTER — OFFICE VISIT (OUTPATIENT)
Dept: INTERNAL MEDICINE CLINIC | Age: 78
End: 2018-03-08
Payer: COMMERCIAL

## 2018-03-08 VITALS
SYSTOLIC BLOOD PRESSURE: 148 MMHG | WEIGHT: 228 LBS | HEART RATE: 52 BPM | BODY MASS INDEX: 40.4 KG/M2 | HEIGHT: 63 IN | DIASTOLIC BLOOD PRESSURE: 70 MMHG

## 2018-03-08 DIAGNOSIS — I10 ESSENTIAL HYPERTENSION: ICD-10-CM

## 2018-03-08 DIAGNOSIS — F32.4 MAJOR DEPRESSIVE DISORDER WITH SINGLE EPISODE, IN PARTIAL REMISSION (HCC): ICD-10-CM

## 2018-03-08 DIAGNOSIS — N28.89 LEFT RENAL MASS: ICD-10-CM

## 2018-03-08 DIAGNOSIS — F02.80 EARLY ONSET ALZHEIMER'S DEMENTIA WITHOUT BEHAVIORAL DISTURBANCE (HCC): ICD-10-CM

## 2018-03-08 DIAGNOSIS — G30.0 EARLY ONSET ALZHEIMER'S DEMENTIA WITHOUT BEHAVIORAL DISTURBANCE (HCC): ICD-10-CM

## 2018-03-08 DIAGNOSIS — Z01.818 PREOP EXAM FOR INTERNAL MEDICINE: Primary | ICD-10-CM

## 2018-03-08 DIAGNOSIS — R80.9 TYPE 2 DIABETES MELLITUS WITH MICROALBUMINURIA, WITH LONG-TERM CURRENT USE OF INSULIN (HCC): ICD-10-CM

## 2018-03-08 DIAGNOSIS — N18.2 CKD STAGE 2 DUE TO TYPE 2 DIABETES MELLITUS (HCC): ICD-10-CM

## 2018-03-08 DIAGNOSIS — Z79.4 TYPE 2 DIABETES MELLITUS WITH MICROALBUMINURIA, WITH LONG-TERM CURRENT USE OF INSULIN (HCC): ICD-10-CM

## 2018-03-08 DIAGNOSIS — E11.22 CKD STAGE 2 DUE TO TYPE 2 DIABETES MELLITUS (HCC): ICD-10-CM

## 2018-03-08 DIAGNOSIS — E66.01 MORBID OBESITY (HCC): ICD-10-CM

## 2018-03-08 DIAGNOSIS — E78.00 PURE HYPERCHOLESTEROLEMIA: ICD-10-CM

## 2018-03-08 DIAGNOSIS — E11.29 TYPE 2 DIABETES MELLITUS WITH MICROALBUMINURIA, WITH LONG-TERM CURRENT USE OF INSULIN (HCC): ICD-10-CM

## 2018-03-08 PROCEDURE — 99244 OFF/OP CNSLTJ NEW/EST MOD 40: CPT | Performed by: INTERNAL MEDICINE

## 2018-03-08 PROCEDURE — 93000 ELECTROCARDIOGRAM COMPLETE: CPT | Performed by: INTERNAL MEDICINE

## 2018-03-08 RX ORDER — HYDRALAZINE HYDROCHLORIDE 50 MG/1
50 TABLET, FILM COATED ORAL 2 TIMES DAILY
Qty: 180 TABLET | Refills: 1 | Status: ON HOLD | OUTPATIENT
Start: 2018-03-08 | End: 2022-10-26

## 2018-03-08 RX ORDER — POTASSIUM CHLORIDE 20 MEQ/1
20 TABLET, EXTENDED RELEASE ORAL DAILY
Qty: 90 TABLET | Refills: 1 | Status: SHIPPED | OUTPATIENT
Start: 2018-03-08

## 2018-03-08 RX ORDER — LISINOPRIL AND HYDROCHLOROTHIAZIDE 20; 12.5 MG/1; MG/1
2 TABLET ORAL DAILY
COMMUNITY
End: 2018-03-16 | Stop reason: SDUPTHER

## 2018-03-08 RX ORDER — HYDROCHLOROTHIAZIDE 25 MG/1
25 TABLET ORAL DAILY
Qty: 90 TABLET | Refills: 1 | Status: SHIPPED | OUTPATIENT
Start: 2018-03-08 | End: 2018-03-16 | Stop reason: SDUPTHER

## 2018-03-08 NOTE — PROGRESS NOTES
Smokeless tobacco: Never Used    Alcohol use No    Drug use: No    Sexual activity: Not on file     Other Topics Concern    Not on file     Social History Narrative    No narrative on file       Family History   Problem Relation Age of Onset    Arthritis Mother     Coronary Art Dis Mother     Diabetes Mother     High Blood Pressure Mother     Other Mother      TIA's    Cancer Father     Stroke Father     Cancer Paternal Grandmother        Review of Systems - General ROS: negative for - chills or fever  Psychological ROS: negative for - anxiety and depression, positive dementia  Hematological and Lymphatic ROS: No history of blood clots or bleeding disorder. Respiratory ROS: no cough, shortness of breath, or wheezing  Cardiovascular ROS: no chest pain or dyspnea on exertion, but extremely limited activity due to knee pain -able to go up 2 steps   Gastrointestinal ROS: no abdominal pain, change in bowel habits, or black or bloody stools  Genito-Urinary ROS: no dysuria, trouble voiding, or hematuria  Musculoskeletal ROS: negative for - muscle pain or muscular weakness, positive right knee pain  Neurological ROS: negative for - headaches, numbness/tingling, seizures or weakness  Dermatological ROS: negative for - rash or skin lesion changes    Blood pressure (!) 148/70, pulse 52, height 5' 3\" (1.6 m), weight 228 lb (103.4 kg). Physical Examination: General appearance -alert, well appearing, and in no distress  Mental status - alert, oriented to person, place, and time  Neck - supple, no significant adenopathy  Chest - clear to auscultation, no wheezes, rales or rhonchi, symmetric air entry  Heart - normal rate, regular rhythm, normal S1, S2, no murmurs, rubs, clicks or gallops  Abdomen -soft, nontender, non-distended   - strong smell of urine in exam room  Extremities - peripheral pulses normal, +2 edema to mid shin bilaterally, no clubbing or cyanosis  Skin - warm and dry    Diagnostic data:    I

## 2018-03-15 ENCOUNTER — TELEPHONE (OUTPATIENT)
Dept: UROLOGY | Age: 78
End: 2018-03-15

## 2018-03-15 NOTE — TELEPHONE ENCOUNTER
DO NOT TAKE ASPIRIN, PLAVIX, COUMADIN, OR MOTRIN-LIKE DRUGS 7 DAYS      PRIOR TO SURGERY AND 3 DAYS FOLLOWING     Keerthi Sky Martinez [birth year] Diagnosis: Left Renal Mass    Surgical Physician:   Irvin Bond      You have been scheduled for the procedure marked below:      Surgery--Robot Assisted laparoscopic left radical nephrectomy           Date: March 21, 2018     Anesthesia: Anesthesiologist (General/Spinal)     Place of Service: [address] [place] [address]         Please be at the Outpatient Department Second Floor at: 9:00am        INSTRUCTIONS AS MARKED BELOW:    1.  If you are having Local Anesthesia, PLEASE EAT A REGULAR BREAKFAST/LUNCH. 2.  If you are having (General/Spinal) Anesthesia:  DO NOT eat or drink anything after midnight before surgery. 3.  Please bring x-ray films to your office appointment. 4.  Please bring x-ray CD to your office appointment. 5.  PLEASE BRING THIS LETTER WITH YOU AND SHOW IT TO THE  AT [place] [address]. 6.  Claudetta Dull, PA-C may assist with your surgery. 7.  Does patient have a [place]? No  8. Please send a copy to the Family Dr: Jason Warner MD  9. If you are a Medicare patient please bring your home Medications with you. 10. Please ensure to bring a  with you the day of the surgery to transport you home.       Date: 3/15/2018

## 2018-03-16 RX ORDER — LISINOPRIL 20 MG/1
40 TABLET ORAL DAILY
COMMUNITY
End: 2018-07-17 | Stop reason: SDUPTHER

## 2018-03-16 RX ORDER — HYDROCHLOROTHIAZIDE 12.5 MG/1
25 CAPSULE, GELATIN COATED ORAL DAILY
COMMUNITY
End: 2018-07-17 | Stop reason: SDUPTHER

## 2018-03-16 RX ORDER — CHOLECALCIFEROL (VITAMIN D3) 125 MCG
2000 CAPSULE ORAL DAILY
Status: ON HOLD | COMMUNITY
End: 2022-10-26

## 2018-03-19 NOTE — H&P
History and Physical performed by     Zina Yang is a new patient who was referred here by Dr. Alirio Nichols for recurrent UTI. She has a history of dementia. Her  cares for her. The patient has had three UTI's in the previous four months. Urine culture from 1/5/18 showed mixed growth. Urine culture from 12/15/17 grew E. Coli. The patient is currently in an ECF for physical therapy following TKA. There is concern for the recurrent UTI and possibility of infection going to new knee. The patient is in denial. Her  is with her today and assists with the history. He reports that the patient does not have any symptoms. The urine is found to have a foul odor and when checked it comes back positive. The patient denies any history of UTI. She denies any gross hematuria. She denies any flank pain. She reports that she rarely gets up at night. She denies any incontinence, however, her  reports that she is frequently incontinent.     Past Medical History        Past Medical History:   Diagnosis Date    Arthritis      CAD (coronary artery disease)       s/p MI, CABG 2 vessels 1994    CKD stage 2 due to type 2 diabetes mellitus (HCC)       secondary to obesity, aging, and DM    DM (diabetes mellitus) ([address]      DM retinopathy ([address]       mild    HTN (hypertension)       variable, improved by chaning her medication raya.     Hyperlipidemia 1994    Morbid obesity ([address]      OAB (overactive bladder)       chronic episodes of incontinence per     UTI (urinary tract infection) 07/2017     preop         Past Surgical History         Past Surgical History:   Procedure Laterality Date    CORONARY ARTERY BYPASS GRAFT   1994     s/p two-vessel    HYSTERECTOMY   1994    KNEE ARTHROSCOPY Right ?     Dr. Clover Barnes        TUMOR REMOVAL   1994     ovarian            Family History          Family History   Problem Relation Age of Onset    Arthritis Mother      Coronary Art Dis Mother      Diabetes Mother      High Blood Pressure Mother      Other Mother         TIA's    Cancer Father      Stroke Father      Cancer Paternal Grandmother                   Social History   Substance Use Topics    Smoking status: Never Smoker    Smokeless tobacco: Never Used    Alcohol use No      Current Facility-Administered Medications          Current Outpatient Prescriptions   Medication Sig Dispense Refill    ciprofloxacin (CIPRO) 500 MG tablet Take 1 tablet by mouth 2 times daily for 14 days 28 tablet 0    sertraline (ZOLOFT) 50 MG tablet Take 1 tablet by mouth daily 30 tablet 3    sulfamethoxazole-trimethoprim (BACTRIM DS;SEPTRA DS) 800-160 MG per tablet Take 1 tablet by mouth 2 times daily        acetaminophen (TYLENOL) 500 MG tablet Take 500 mg by mouth every 6 hours as needed for Pain        solifenacin (VESICARE) 10 MG tablet Take 1 tablet by mouth daily 90 tablet 1    metoprolol succinate (TOPROL XL) 100 MG extended release tablet Take 1 tablet by mouth daily 90 tablet 1    atorvastatin (LIPITOR) 40 MG tablet Take 1 tablet by mouth daily 90 tablet 1    Glucose Blood (BLOOD GLUCOSE TEST STRIPS) STRP One Touch Ultra Mini - Test blood glucose 3 times per day and as needed. Dx E11.65 300 strip 1    Dulaglutide (TRULICITY) 2.55 IO/8.1HH SOPN Inject 0.75 mg into the skin once a week 4 Pen 5    lisinopril (PRINIVIL;ZESTRIL) 40 MG tablet Take 1 tablet by mouth daily 90 tablet 1    metFORMIN (GLUCOPHAGE) 500 MG tablet Take 1 tablet by mouth 2 times daily (with meals) 180 tablet 1    amLODIPine (NORVASC) 10 MG tablet Take 1 tablet by mouth daily 90 tablet 1    INSULIN SYRINGE 1CC/29G 29G X 1/2\" 1 ML MISC 1 each by Does not apply route daily 100 each 5    Lancets MISC Ultra Mini One Touch - Test glucose 3 times per day and as needed. DX E11.65 100 each 3    Blood Glucose Monitoring Suppl (ONE TOUCH ULTRA MINI) w/Device KIT Test blood sugars three times daily .  DX:E11.9

## 2018-03-20 ENCOUNTER — SURG/PROC ORDERS (OUTPATIENT)
Dept: UROLOGY | Age: 78
End: 2018-03-20

## 2018-03-20 ENCOUNTER — TELEPHONE (OUTPATIENT)
Dept: UROLOGY | Age: 78
End: 2018-03-20

## 2018-03-20 RX ORDER — SODIUM CHLORIDE 9 MG/ML
INJECTION, SOLUTION INTRAVENOUS CONTINUOUS
Status: CANCELLED | OUTPATIENT
Start: 2018-03-21

## 2018-03-20 NOTE — TELEPHONE ENCOUNTER
Called [facility] and spoke with Brooke Gamez and Ailyn's upcoming surgery is authorized for dates 3.21.18-3.24.18. Auth# [id number]. Call Ref# [id number].

## 2018-03-21 ENCOUNTER — ANESTHESIA EVENT (OUTPATIENT)
Dept: OPERATING ROOM | Age: 78
DRG: 659 | End: 2018-03-21
Payer: MEDICARE

## 2018-03-21 ENCOUNTER — ANESTHESIA (OUTPATIENT)
Dept: OPERATING ROOM | Age: 78
DRG: 659 | End: 2018-03-21
Payer: MEDICARE

## 2018-03-21 ENCOUNTER — HOSPITAL ENCOUNTER (INPATIENT)
Age: 78
LOS: 5 days | Discharge: NURSING, REHAB OR LONG-TERM CARE | DRG: 659 | End: 2018-03-26
Attending: UROLOGY | Admitting: UROLOGY
Payer: MEDICARE

## 2018-03-21 VITALS
RESPIRATION RATE: 5 BRPM | TEMPERATURE: 95.9 F | OXYGEN SATURATION: 100 % | SYSTOLIC BLOOD PRESSURE: 186 MMHG | DIASTOLIC BLOOD PRESSURE: 108 MMHG

## 2018-03-21 PROBLEM — N28.89 LEFT KIDNEY MASS: Status: ACTIVE | Noted: 2018-03-21

## 2018-03-21 LAB
ABO: NORMAL
ANTIBODY SCREEN: NORMAL
GLUCOSE BLD-MCNC: 104 MG/DL (ref 70–108)
GLUCOSE BLD-MCNC: 136 MG/DL (ref 70–108)
GLUCOSE BLD-MCNC: 154 MG/DL (ref 70–108)
POTASSIUM SERPL-SCNC: 5 MEQ/L (ref 3.5–5.2)
RH FACTOR: NORMAL

## 2018-03-21 PROCEDURE — 86901 BLOOD TYPING SEROLOGIC RH(D): CPT

## 2018-03-21 PROCEDURE — 86850 RBC ANTIBODY SCREEN: CPT

## 2018-03-21 PROCEDURE — 82948 REAGENT STRIP/BLOOD GLUCOSE: CPT

## 2018-03-21 PROCEDURE — 3600000009 HC SURGERY ROBOT BASE: Performed by: UROLOGY

## 2018-03-21 PROCEDURE — 2500000003 HC RX 250 WO HCPCS: Performed by: UROLOGY

## 2018-03-21 PROCEDURE — 86900 BLOOD TYPING SEROLOGIC ABO: CPT

## 2018-03-21 PROCEDURE — 6370000000 HC RX 637 (ALT 250 FOR IP): Performed by: PHYSICIAN ASSISTANT

## 2018-03-21 PROCEDURE — 1200000000 HC SEMI PRIVATE

## 2018-03-21 PROCEDURE — S2900 ROBOTIC SURGICAL SYSTEM: HCPCS | Performed by: UROLOGY

## 2018-03-21 PROCEDURE — 88307 TISSUE EXAM BY PATHOLOGIST: CPT

## 2018-03-21 PROCEDURE — 2500000003 HC RX 250 WO HCPCS: Performed by: ANESTHESIOLOGY

## 2018-03-21 PROCEDURE — 7100000000 HC PACU RECOVERY - FIRST 15 MIN: Performed by: UROLOGY

## 2018-03-21 PROCEDURE — 2580000003 HC RX 258: Performed by: UROLOGY

## 2018-03-21 PROCEDURE — 8E0W4CZ ROBOTIC ASSISTED PROCEDURE OF TRUNK REGION, PERCUTANEOUS ENDOSCOPIC APPROACH: ICD-10-PCS | Performed by: UROLOGY

## 2018-03-21 PROCEDURE — 84132 ASSAY OF SERUM POTASSIUM: CPT

## 2018-03-21 PROCEDURE — 6360000002 HC RX W HCPCS: Performed by: UROLOGY

## 2018-03-21 PROCEDURE — 2780000010 HC IMPLANT OTHER: Performed by: UROLOGY

## 2018-03-21 PROCEDURE — 3600000019 HC SURGERY ROBOT ADDTL 15MIN: Performed by: UROLOGY

## 2018-03-21 PROCEDURE — 6360000002 HC RX W HCPCS: Performed by: PHYSICIAN ASSISTANT

## 2018-03-21 PROCEDURE — 36415 COLL VENOUS BLD VENIPUNCTURE: CPT

## 2018-03-21 PROCEDURE — 6360000002 HC RX W HCPCS: Performed by: NURSE ANESTHETIST, CERTIFIED REGISTERED

## 2018-03-21 PROCEDURE — 3700000001 HC ADD 15 MINUTES (ANESTHESIA): Performed by: UROLOGY

## 2018-03-21 PROCEDURE — 7100000001 HC PACU RECOVERY - ADDTL 15 MIN: Performed by: UROLOGY

## 2018-03-21 PROCEDURE — 50545 LAPARO RADICAL NEPHRECTOMY: CPT | Performed by: UROLOGY

## 2018-03-21 PROCEDURE — 2500000003 HC RX 250 WO HCPCS: Performed by: NURSE ANESTHETIST, CERTIFIED REGISTERED

## 2018-03-21 PROCEDURE — 2580000003 HC RX 258: Performed by: PHYSICIAN ASSISTANT

## 2018-03-21 PROCEDURE — 50545 LAPARO RADICAL NEPHRECTOMY: CPT | Performed by: PHYSICIAN ASSISTANT

## 2018-03-21 PROCEDURE — A6454 SELF-ADHER BAND W>=3" <5"/YD: HCPCS | Performed by: UROLOGY

## 2018-03-21 PROCEDURE — 3700000000 HC ANESTHESIA ATTENDED CARE: Performed by: UROLOGY

## 2018-03-21 PROCEDURE — 0TT14ZZ RESECTION OF LEFT KIDNEY, PERCUTANEOUS ENDOSCOPIC APPROACH: ICD-10-PCS | Performed by: UROLOGY

## 2018-03-21 RX ORDER — FAMOTIDINE 20 MG/1
20 TABLET, FILM COATED ORAL DAILY
Status: DISCONTINUED | OUTPATIENT
Start: 2018-03-21 | End: 2018-03-26 | Stop reason: HOSPADM

## 2018-03-21 RX ORDER — ATORVASTATIN CALCIUM 40 MG/1
40 TABLET, FILM COATED ORAL DAILY
Status: DISCONTINUED | OUTPATIENT
Start: 2018-03-21 | End: 2018-03-26 | Stop reason: HOSPADM

## 2018-03-21 RX ORDER — DEXTROSE MONOHYDRATE 50 MG/ML
100 INJECTION, SOLUTION INTRAVENOUS PRN
Status: DISCONTINUED | OUTPATIENT
Start: 2018-03-21 | End: 2018-03-26 | Stop reason: HOSPADM

## 2018-03-21 RX ORDER — HYDRALAZINE HYDROCHLORIDE 50 MG/1
50 TABLET, FILM COATED ORAL 2 TIMES DAILY
Status: DISCONTINUED | OUTPATIENT
Start: 2018-03-21 | End: 2018-03-26 | Stop reason: HOSPADM

## 2018-03-21 RX ORDER — HYDROCHLOROTHIAZIDE 25 MG/1
25 TABLET ORAL DAILY
Status: DISCONTINUED | OUTPATIENT
Start: 2018-03-22 | End: 2018-03-26 | Stop reason: HOSPADM

## 2018-03-21 RX ORDER — SOLIFENACIN SUCCINATE 10 MG/1
10 TABLET, FILM COATED ORAL DAILY
Status: DISCONTINUED | OUTPATIENT
Start: 2018-03-21 | End: 2018-03-26 | Stop reason: HOSPADM

## 2018-03-21 RX ORDER — AMLODIPINE BESYLATE 10 MG/1
10 TABLET ORAL DAILY
Status: DISCONTINUED | OUTPATIENT
Start: 2018-03-21 | End: 2018-03-26 | Stop reason: HOSPADM

## 2018-03-21 RX ORDER — PROPOFOL 10 MG/ML
INJECTION, EMULSION INTRAVENOUS PRN
Status: DISCONTINUED | OUTPATIENT
Start: 2018-03-21 | End: 2018-03-21 | Stop reason: SDUPTHER

## 2018-03-21 RX ORDER — MIDAZOLAM HYDROCHLORIDE 1 MG/ML
INJECTION INTRAMUSCULAR; INTRAVENOUS PRN
Status: DISCONTINUED | OUTPATIENT
Start: 2018-03-21 | End: 2018-03-21 | Stop reason: SDUPTHER

## 2018-03-21 RX ORDER — INSULIN GLARGINE 100 [IU]/ML
45 INJECTION, SOLUTION SUBCUTANEOUS EVERY MORNING
Status: DISCONTINUED | OUTPATIENT
Start: 2018-03-22 | End: 2018-03-26 | Stop reason: HOSPADM

## 2018-03-21 RX ORDER — ONDANSETRON 2 MG/ML
4 INJECTION INTRAMUSCULAR; INTRAVENOUS EVERY 6 HOURS PRN
Status: DISCONTINUED | OUTPATIENT
Start: 2018-03-21 | End: 2018-03-26 | Stop reason: HOSPADM

## 2018-03-21 RX ORDER — TROSPIUM CHLORIDE 20 MG/1
20 TABLET, FILM COATED ORAL NIGHTLY
Status: DISCONTINUED | OUTPATIENT
Start: 2018-03-21 | End: 2018-03-21

## 2018-03-21 RX ORDER — SUCCINYLCHOLINE CHLORIDE 20 MG/ML
INJECTION INTRAMUSCULAR; INTRAVENOUS PRN
Status: DISCONTINUED | OUTPATIENT
Start: 2018-03-21 | End: 2018-03-21 | Stop reason: SDUPTHER

## 2018-03-21 RX ORDER — SODIUM CHLORIDE 0.9 % (FLUSH) 0.9 %
10 SYRINGE (ML) INJECTION PRN
Status: DISCONTINUED | OUTPATIENT
Start: 2018-03-21 | End: 2018-03-26 | Stop reason: HOSPADM

## 2018-03-21 RX ORDER — DEXAMETHASONE SODIUM PHOSPHATE 4 MG/ML
INJECTION, SOLUTION INTRA-ARTICULAR; INTRALESIONAL; INTRAMUSCULAR; INTRAVENOUS; SOFT TISSUE PRN
Status: DISCONTINUED | OUTPATIENT
Start: 2018-03-21 | End: 2018-03-21 | Stop reason: SDUPTHER

## 2018-03-21 RX ORDER — POTASSIUM CHLORIDE 20 MEQ/1
20 TABLET, EXTENDED RELEASE ORAL DAILY
Status: DISCONTINUED | OUTPATIENT
Start: 2018-03-21 | End: 2018-03-26 | Stop reason: HOSPADM

## 2018-03-21 RX ORDER — LISINOPRIL 40 MG/1
40 TABLET ORAL DAILY
Status: DISCONTINUED | OUTPATIENT
Start: 2018-03-22 | End: 2018-03-26 | Stop reason: HOSPADM

## 2018-03-21 RX ORDER — MORPHINE SULFATE 4 MG/ML
4 INJECTION, SOLUTION INTRAMUSCULAR; INTRAVENOUS
Status: DISPENSED | OUTPATIENT
Start: 2018-03-21 | End: 2018-03-22

## 2018-03-21 RX ORDER — SODIUM CHLORIDE 9 MG/ML
INJECTION, SOLUTION INTRAVENOUS CONTINUOUS
Status: DISCONTINUED | OUTPATIENT
Start: 2018-03-21 | End: 2018-03-21

## 2018-03-21 RX ORDER — ONDANSETRON 2 MG/ML
INJECTION INTRAMUSCULAR; INTRAVENOUS PRN
Status: DISCONTINUED | OUTPATIENT
Start: 2018-03-21 | End: 2018-03-21 | Stop reason: SDUPTHER

## 2018-03-21 RX ORDER — LISINOPRIL 20 MG/1
20 TABLET ORAL DAILY
Status: DISCONTINUED | OUTPATIENT
Start: 2018-03-21 | End: 2018-03-21

## 2018-03-21 RX ORDER — NICOTINE POLACRILEX 4 MG
15 LOZENGE BUCCAL PRN
Status: DISCONTINUED | OUTPATIENT
Start: 2018-03-21 | End: 2018-03-26 | Stop reason: HOSPADM

## 2018-03-21 RX ORDER — MORPHINE SULFATE 2 MG/ML
2 INJECTION, SOLUTION INTRAMUSCULAR; INTRAVENOUS
Status: DISPENSED | OUTPATIENT
Start: 2018-03-21 | End: 2018-03-22

## 2018-03-21 RX ORDER — SODIUM CHLORIDE 9 MG/ML
INJECTION, SOLUTION INTRAVENOUS CONTINUOUS
Status: DISCONTINUED | OUTPATIENT
Start: 2018-03-21 | End: 2018-03-22

## 2018-03-21 RX ORDER — HYDROCODONE BITARTRATE AND ACETAMINOPHEN 5; 325 MG/1; MG/1
2 TABLET ORAL EVERY 4 HOURS PRN
Status: DISCONTINUED | OUTPATIENT
Start: 2018-03-21 | End: 2018-03-23

## 2018-03-21 RX ORDER — DEXTROSE MONOHYDRATE 25 G/50ML
12.5 INJECTION, SOLUTION INTRAVENOUS PRN
Status: DISCONTINUED | OUTPATIENT
Start: 2018-03-21 | End: 2018-03-26 | Stop reason: HOSPADM

## 2018-03-21 RX ORDER — CEFOXITIN 2 G/1
INJECTION, POWDER, FOR SOLUTION INTRAVENOUS
Status: DISPENSED
Start: 2018-03-21 | End: 2018-03-21

## 2018-03-21 RX ORDER — HYDROMORPHONE HCL 110MG/55ML
PATIENT CONTROLLED ANALGESIA SYRINGE INTRAVENOUS PRN
Status: DISCONTINUED | OUTPATIENT
Start: 2018-03-21 | End: 2018-03-21 | Stop reason: SDUPTHER

## 2018-03-21 RX ORDER — BUPIVACAINE HYDROCHLORIDE 5 MG/ML
INJECTION, SOLUTION EPIDURAL; INTRACAUDAL PRN
Status: DISCONTINUED | OUTPATIENT
Start: 2018-03-21 | End: 2018-03-21 | Stop reason: HOSPADM

## 2018-03-21 RX ORDER — MORPHINE SULFATE 2 MG/ML
2 INJECTION, SOLUTION INTRAMUSCULAR; INTRAVENOUS EVERY 5 MIN PRN
Status: DISCONTINUED | OUTPATIENT
Start: 2018-03-21 | End: 2018-03-21 | Stop reason: HOSPADM

## 2018-03-21 RX ORDER — ROCURONIUM BROMIDE 10 MG/ML
INJECTION, SOLUTION INTRAVENOUS PRN
Status: DISCONTINUED | OUTPATIENT
Start: 2018-03-21 | End: 2018-03-21 | Stop reason: SDUPTHER

## 2018-03-21 RX ORDER — DOCUSATE SODIUM 100 MG/1
100 CAPSULE, LIQUID FILLED ORAL 2 TIMES DAILY
Status: DISCONTINUED | OUTPATIENT
Start: 2018-03-21 | End: 2018-03-26 | Stop reason: HOSPADM

## 2018-03-21 RX ORDER — GLYCOPYRROLATE 1 MG/5 ML
SYRINGE (ML) INTRAVENOUS PRN
Status: DISCONTINUED | OUTPATIENT
Start: 2018-03-21 | End: 2018-03-21 | Stop reason: SDUPTHER

## 2018-03-21 RX ORDER — METOPROLOL SUCCINATE 100 MG/1
100 TABLET, EXTENDED RELEASE ORAL DAILY
Status: DISCONTINUED | OUTPATIENT
Start: 2018-03-21 | End: 2018-03-26 | Stop reason: HOSPADM

## 2018-03-21 RX ORDER — DEXTROSE MONOHYDRATE 50 MG/ML
INJECTION, SOLUTION INTRAVENOUS
Status: DISPENSED
Start: 2018-03-21 | End: 2018-03-21

## 2018-03-21 RX ORDER — LIDOCAINE HYDROCHLORIDE 20 MG/ML
INJECTION, SOLUTION INFILTRATION; PERINEURAL PRN
Status: DISCONTINUED | OUTPATIENT
Start: 2018-03-21 | End: 2018-03-21 | Stop reason: SDUPTHER

## 2018-03-21 RX ORDER — IBUPROFEN 200 MG
TABLET ORAL 2 TIMES DAILY
Status: DISCONTINUED | OUTPATIENT
Start: 2018-03-21 | End: 2018-03-26 | Stop reason: HOSPADM

## 2018-03-21 RX ORDER — SODIUM CHLORIDE 0.9 % (FLUSH) 0.9 %
10 SYRINGE (ML) INJECTION EVERY 12 HOURS SCHEDULED
Status: DISCONTINUED | OUTPATIENT
Start: 2018-03-21 | End: 2018-03-26 | Stop reason: HOSPADM

## 2018-03-21 RX ORDER — FENTANYL CITRATE 50 UG/ML
INJECTION, SOLUTION INTRAMUSCULAR; INTRAVENOUS PRN
Status: DISCONTINUED | OUTPATIENT
Start: 2018-03-21 | End: 2018-03-21 | Stop reason: SDUPTHER

## 2018-03-21 RX ORDER — ATROPA BELLADONNA AND OPIUM 16.2; 3 MG/1; MG/1
30 SUPPOSITORY RECTAL EVERY 8 HOURS PRN
Status: DISCONTINUED | OUTPATIENT
Start: 2018-03-21 | End: 2018-03-26 | Stop reason: HOSPADM

## 2018-03-21 RX ORDER — HYDROCODONE BITARTRATE AND ACETAMINOPHEN 5; 325 MG/1; MG/1
1 TABLET ORAL EVERY 4 HOURS PRN
Status: DISCONTINUED | OUTPATIENT
Start: 2018-03-21 | End: 2018-03-23

## 2018-03-21 RX ORDER — HYDROCHLOROTHIAZIDE 12.5 MG/1
12.5 CAPSULE, GELATIN COATED ORAL DAILY
Status: DISCONTINUED | OUTPATIENT
Start: 2018-03-21 | End: 2018-03-21

## 2018-03-21 RX ORDER — FENTANYL CITRATE 50 UG/ML
50 INJECTION, SOLUTION INTRAMUSCULAR; INTRAVENOUS EVERY 5 MIN PRN
Status: DISCONTINUED | OUTPATIENT
Start: 2018-03-21 | End: 2018-03-21 | Stop reason: HOSPADM

## 2018-03-21 RX ORDER — ACETAMINOPHEN 325 MG/1
650 TABLET ORAL EVERY 4 HOURS PRN
Status: DISCONTINUED | OUTPATIENT
Start: 2018-03-21 | End: 2018-03-26 | Stop reason: HOSPADM

## 2018-03-21 RX ORDER — LABETALOL HYDROCHLORIDE 5 MG/ML
10 INJECTION, SOLUTION INTRAVENOUS EVERY 10 MIN PRN
Status: DISCONTINUED | OUTPATIENT
Start: 2018-03-21 | End: 2018-03-21 | Stop reason: HOSPADM

## 2018-03-21 RX ORDER — NEOSTIGMINE METHYLSULFATE 1 MG/ML
INJECTION, SOLUTION INTRAVENOUS PRN
Status: DISCONTINUED | OUTPATIENT
Start: 2018-03-21 | End: 2018-03-21 | Stop reason: SDUPTHER

## 2018-03-21 RX ADMIN — Medication 0.2 MG: at 12:19

## 2018-03-21 RX ADMIN — MORPHINE SULFATE 2 MG: 2 INJECTION, SOLUTION INTRAMUSCULAR; INTRAVENOUS at 19:12

## 2018-03-21 RX ADMIN — FENTANYL CITRATE 50 MCG: 50 INJECTION INTRAMUSCULAR; INTRAVENOUS at 12:57

## 2018-03-21 RX ADMIN — LIDOCAINE HYDROCHLORIDE 60 MG: 20 INJECTION, SOLUTION INFILTRATION; PERINEURAL at 11:49

## 2018-03-21 RX ADMIN — BACITRACIN ZINC NEOMYCIN SULFATE POLYMYXIN B SULFATE: 400; 3.5; 5 OINTMENT TOPICAL at 22:36

## 2018-03-21 RX ADMIN — Medication 0.2 MG: at 14:33

## 2018-03-21 RX ADMIN — CEFOXITIN SODIUM 2 G: 2 POWDER, FOR SOLUTION INTRAVENOUS at 22:39

## 2018-03-21 RX ADMIN — PHENYLEPHRINE HYDROCHLORIDE 100 MCG: 10 INJECTION INTRAMUSCULAR; INTRAVENOUS; SUBCUTANEOUS at 13:37

## 2018-03-21 RX ADMIN — SODIUM CHLORIDE: 9 INJECTION, SOLUTION INTRAVENOUS at 20:32

## 2018-03-21 RX ADMIN — NEOSTIGMINE METHYLSULFATE 1 MG: 1 INJECTION, SOLUTION INTRAVENOUS at 14:33

## 2018-03-21 RX ADMIN — NEOSTIGMINE METHYLSULFATE 4 MG: 1 INJECTION, SOLUTION INTRAVENOUS at 14:14

## 2018-03-21 RX ADMIN — CEFOXITIN 2 G: 2 INJECTION, POWDER, FOR SOLUTION INTRAVENOUS at 12:12

## 2018-03-21 RX ADMIN — POTASSIUM CHLORIDE 20 MEQ: 1500 TABLET, EXTENDED RELEASE ORAL at 19:05

## 2018-03-21 RX ADMIN — INSULIN LISPRO 1 UNITS: 100 INJECTION, SOLUTION INTRAVENOUS; SUBCUTANEOUS at 21:22

## 2018-03-21 RX ADMIN — FENTANYL CITRATE 100 MCG: 50 INJECTION INTRAMUSCULAR; INTRAVENOUS at 11:49

## 2018-03-21 RX ADMIN — DOCUSATE SODIUM 100 MG: 100 CAPSULE ORAL at 22:35

## 2018-03-21 RX ADMIN — ONDANSETRON 4 MG: 2 INJECTION INTRAMUSCULAR; INTRAVENOUS at 13:48

## 2018-03-21 RX ADMIN — PHENYLEPHRINE HYDROCHLORIDE 100 MCG: 10 INJECTION INTRAMUSCULAR; INTRAVENOUS; SUBCUTANEOUS at 13:33

## 2018-03-21 RX ADMIN — HYDRALAZINE HYDROCHLORIDE 50 MG: 50 TABLET, FILM COATED ORAL at 22:38

## 2018-03-21 RX ADMIN — SOLIFENACIN SUCCINATE 10 MG: 10 TABLET, FILM COATED ORAL at 22:37

## 2018-03-21 RX ADMIN — SODIUM CHLORIDE: 9 INJECTION, SOLUTION INTRAVENOUS at 14:10

## 2018-03-21 RX ADMIN — ATORVASTATIN CALCIUM 40 MG: 40 TABLET, FILM COATED ORAL at 22:39

## 2018-03-21 RX ADMIN — DEXAMETHASONE SODIUM PHOSPHATE 4 MG: 4 INJECTION, SOLUTION INTRAMUSCULAR; INTRAVENOUS at 12:43

## 2018-03-21 RX ADMIN — Medication 20 MG: at 12:49

## 2018-03-21 RX ADMIN — LABETALOL HYDROCHLORIDE 10 MG: 5 INJECTION INTRAVENOUS at 15:08

## 2018-03-21 RX ADMIN — Medication 0.6 MG: at 14:14

## 2018-03-21 RX ADMIN — PHENYLEPHRINE HYDROCHLORIDE 100 MCG: 10 INJECTION INTRAMUSCULAR; INTRAVENOUS; SUBCUTANEOUS at 12:19

## 2018-03-21 RX ADMIN — FAMOTIDINE 20 MG: 20 TABLET, FILM COATED ORAL at 19:09

## 2018-03-21 RX ADMIN — SERTRALINE 50 MG: 50 TABLET, FILM COATED ORAL at 19:06

## 2018-03-21 RX ADMIN — MIDAZOLAM HYDROCHLORIDE 2 MG: 1 INJECTION, SOLUTION INTRAMUSCULAR; INTRAVENOUS at 11:48

## 2018-03-21 RX ADMIN — PHENYLEPHRINE HYDROCHLORIDE 100 MCG: 10 INJECTION INTRAMUSCULAR; INTRAVENOUS; SUBCUTANEOUS at 12:33

## 2018-03-21 RX ADMIN — AMLODIPINE BESYLATE 10 MG: 10 TABLET ORAL at 19:08

## 2018-03-21 RX ADMIN — Medication 50 MG: at 12:02

## 2018-03-21 RX ADMIN — SODIUM CHLORIDE: 9 INJECTION, SOLUTION INTRAVENOUS at 11:27

## 2018-03-21 RX ADMIN — TROSPIUM CHLORIDE 20 MG: 20 TABLET ORAL at 21:00

## 2018-03-21 RX ADMIN — PROPOFOL 150 MG: 10 INJECTION, EMULSION INTRAVENOUS at 11:49

## 2018-03-21 RX ADMIN — PHENYLEPHRINE HYDROCHLORIDE 100 MCG: 10 INJECTION INTRAMUSCULAR; INTRAVENOUS; SUBCUTANEOUS at 12:32

## 2018-03-21 RX ADMIN — SUCCINYLCHOLINE CHLORIDE 140 MG: 20 INJECTION, SOLUTION INTRAMUSCULAR; INTRAVENOUS at 11:49

## 2018-03-21 RX ADMIN — HYDROMORPHONE HYDROCHLORIDE 1 MG: 2 INJECTION INTRAMUSCULAR; INTRAVENOUS; SUBCUTANEOUS at 13:03

## 2018-03-21 RX ADMIN — PHENYLEPHRINE HYDROCHLORIDE 100 MCG: 10 INJECTION INTRAMUSCULAR; INTRAVENOUS; SUBCUTANEOUS at 12:42

## 2018-03-21 RX ADMIN — FENTANYL CITRATE 50 MCG: 50 INJECTION INTRAMUSCULAR; INTRAVENOUS at 12:50

## 2018-03-21 RX ADMIN — Medication 10 ML: at 22:41

## 2018-03-21 RX ADMIN — METOPROLOL SUCCINATE 100 MG: 100 TABLET, EXTENDED RELEASE ORAL at 19:04

## 2018-03-21 RX ADMIN — FENTANYL CITRATE 50 MCG: 50 INJECTION INTRAMUSCULAR; INTRAVENOUS at 12:03

## 2018-03-21 ASSESSMENT — PULMONARY FUNCTION TESTS
PIF_VALUE: 31
PIF_VALUE: 27
PIF_VALUE: 26
PIF_VALUE: 27
PIF_VALUE: 20
PIF_VALUE: 18
PIF_VALUE: 13
PIF_VALUE: 17
PIF_VALUE: 16
PIF_VALUE: 12
PIF_VALUE: 33
PIF_VALUE: 26
PIF_VALUE: 18
PIF_VALUE: 27
PIF_VALUE: 19
PIF_VALUE: 29
PIF_VALUE: 27
PIF_VALUE: 16
PIF_VALUE: 20
PIF_VALUE: 29
PIF_VALUE: 17
PIF_VALUE: 31
PIF_VALUE: 20
PIF_VALUE: 28
PIF_VALUE: 26
PIF_VALUE: 18
PIF_VALUE: 20
PIF_VALUE: 21
PIF_VALUE: 9
PIF_VALUE: 28
PIF_VALUE: 20
PIF_VALUE: 33
PIF_VALUE: 31
PIF_VALUE: 26
PIF_VALUE: 17
PIF_VALUE: 12
PIF_VALUE: 19
PIF_VALUE: 21
PIF_VALUE: 19
PIF_VALUE: 31
PIF_VALUE: 28
PIF_VALUE: 19
PIF_VALUE: 18
PIF_VALUE: 28
PIF_VALUE: 6
PIF_VALUE: 17
PIF_VALUE: 19
PIF_VALUE: 26
PIF_VALUE: 21
PIF_VALUE: 21
PIF_VALUE: 27
PIF_VALUE: 26
PIF_VALUE: 26
PIF_VALUE: 18
PIF_VALUE: 17
PIF_VALUE: 18
PIF_VALUE: 28
PIF_VALUE: 0
PIF_VALUE: 18
PIF_VALUE: 28
PIF_VALUE: 19
PIF_VALUE: 28
PIF_VALUE: 28
PIF_VALUE: 20
PIF_VALUE: 20
PIF_VALUE: 18
PIF_VALUE: 29
PIF_VALUE: 20
PIF_VALUE: 19
PIF_VALUE: 27
PIF_VALUE: 20
PIF_VALUE: 18
PIF_VALUE: 20
PIF_VALUE: 19
PIF_VALUE: 18
PIF_VALUE: 27
PIF_VALUE: 20
PIF_VALUE: 20
PIF_VALUE: 29
PIF_VALUE: 30
PIF_VALUE: 17
PIF_VALUE: 19
PIF_VALUE: 27
PIF_VALUE: 18
PIF_VALUE: 18
PIF_VALUE: 28
PIF_VALUE: 19
PIF_VALUE: 20
PIF_VALUE: 6
PIF_VALUE: 20
PIF_VALUE: 5
PIF_VALUE: 28
PIF_VALUE: 2
PIF_VALUE: 21
PIF_VALUE: 32
PIF_VALUE: 19
PIF_VALUE: 29
PIF_VALUE: 28
PIF_VALUE: 3
PIF_VALUE: 1
PIF_VALUE: 19
PIF_VALUE: 17
PIF_VALUE: 9
PIF_VALUE: 27
PIF_VALUE: 18
PIF_VALUE: 27
PIF_VALUE: 26
PIF_VALUE: 20
PIF_VALUE: 21
PIF_VALUE: 27
PIF_VALUE: 27
PIF_VALUE: 19
PIF_VALUE: 28
PIF_VALUE: 32
PIF_VALUE: 20
PIF_VALUE: 18
PIF_VALUE: 0
PIF_VALUE: 20
PIF_VALUE: 28
PIF_VALUE: 28
PIF_VALUE: 2
PIF_VALUE: 20
PIF_VALUE: 18
PIF_VALUE: 2
PIF_VALUE: 16
PIF_VALUE: 19
PIF_VALUE: 18
PIF_VALUE: 19
PIF_VALUE: 16
PIF_VALUE: 25
PIF_VALUE: 27
PIF_VALUE: 26
PIF_VALUE: 28
PIF_VALUE: 19
PIF_VALUE: 20
PIF_VALUE: 20
PIF_VALUE: 12
PIF_VALUE: 28
PIF_VALUE: 5
PIF_VALUE: 32
PIF_VALUE: 26
PIF_VALUE: 23
PIF_VALUE: 19
PIF_VALUE: 18
PIF_VALUE: 10
PIF_VALUE: 19
PIF_VALUE: 18
PIF_VALUE: 20
PIF_VALUE: 27
PIF_VALUE: 30
PIF_VALUE: 8
PIF_VALUE: 18
PIF_VALUE: 28
PIF_VALUE: 1
PIF_VALUE: 31
PIF_VALUE: 23
PIF_VALUE: 7
PIF_VALUE: 21
PIF_VALUE: 27
PIF_VALUE: 1
PIF_VALUE: 28
PIF_VALUE: 18
PIF_VALUE: 22
PIF_VALUE: 19
PIF_VALUE: 17
PIF_VALUE: 20
PIF_VALUE: 21

## 2018-03-21 ASSESSMENT — PAIN SCALES - GENERAL
PAINLEVEL_OUTOF10: 0
PAINLEVEL_OUTOF10: 5

## 2018-03-21 NOTE — PROGRESS NOTES
1538  Patient meets PACU D/C criteria at this time. Patient is awake and alert on 2L NC and VSS. Patient denies nausea and states pain is 2/10 and tolerable at this time. Family updated. Report called to [address] [place] [address].  [facility]  Patient transported to [address] [place] [address] room 54 on 2L NC. No issues at this time.

## 2018-03-21 NOTE — ANESTHESIA PRE PROCEDURE
Department of Anesthesiology  Preprocedure Note       Name:  Abad Carrizales   Age:  78 y.o.  :  1940                                          MRN:  [id number]         Date:  3/21/2018      Surgeon: David Saxena):  Wali Gallagher MD    Procedure: Procedure(s):  ROBOT ASSISTED LAPAROSCOPIC LEFT RADICAL NEPHRECTOMY    Medications prior to admission:   Prior to Admission medications    Medication Sig Start Date End Date Taking?  Authorizing Provider   Cholecalciferol (VITAMIN D3) 2000 units TABS Take by mouth daily   Yes Historical Provider, MD   Dulaglutide (TRULICITY) 2.98 BI/6.6FS SOPN Inject 0.75 mg into the skin once a week Takes on Mon   Yes Historical Provider, MD   sertraline (ZOLOFT) 50 MG tablet Take 50 mg by mouth daily   Yes Historical Provider, MD   lisinopril (PRINIVIL;ZESTRIL) 20 MG tablet Take 20 mg by mouth daily   Yes Historical Provider, MD   hydrochlorothiazide (MICROZIDE) 12.5 MG capsule Take 12.5 mg by mouth daily   Yes Historical Provider, MD   hydrALAZINE (APRESOLINE) 50 MG tablet Take 1 tablet by mouth 2 times daily 3/8/18  Yes Danyelle Hallman MD   Magnesium 400 MG CAPS Take 1 tablet by mouth 2 times daily 3/8/18  Yes Danyelle Hallman MD   potassium chloride (KLOR-CON M) 20 MEQ extended release tablet Take 1 tablet by mouth daily 3/8/18  Yes Danyelle Hallman MD   methenamine (HIPREX) 1 g tablet Take 1 tablet by mouth 2 times daily (with meals) 18  Yes Zak Torres NP   acetaminophen (TYLENOL) 500 MG tablet Take 500 mg by mouth every 6 hours as needed for Pain   Yes Historical Provider, MD   solifenacin (VESICARE) 10 MG tablet Take 1 tablet by mouth daily 11/10/17  Yes Danyelle Hallman MD   metoprolol succinate (TOPROL XL) 100 MG extended release tablet Take 1 tablet by mouth daily 10/30/17  Yes Danyelle Hallman MD   atorvastatin (LIPITOR) 40 MG tablet Take 1 tablet by mouth daily 10/21/17  Yes Danyelle Hallman MD   metFORMIN (GLUCOPHAGE) 500 MG tablet Take 1 tablet by mouth 2 times daily (with meals) 9/19/17  Yes Zoe Sánchez MD   amLODIPine (NORVASC) 10 MG tablet Take 1 tablet by mouth daily 9/19/17  Yes Zoe Sánchez MD   insulin detemir (LEVEMIR) 100 UNIT/ML injection vial Inject 45 Units into the skin daily    Yes Historical Provider, MD   aspirin 325 MG EC tablet Take 325 mg by mouth daily    Yes Historical Provider, MD       Current medications:    No current facility-administered medications for this encounter. Allergies:  No Known Allergies    Problem List:    Patient Active Problem List   Diagnosis Code    HTN (hypertension) I10    CAD (coronary artery disease) I25.10    Arthritis M19.90    DM (diabetes mellitus) ([address] E11.9    CKD stage 2 due to type 2 diabetes mellitus (HCC) E11.22, N18.2    Hyperlipidemia E78.5    Morbid obesity ([address] E66.01    Early onset Alzheimer's dementia without behavioral disturbance G30.0, F02.80    Major depressive disorder with single episode, in partial remission ([address] F32.4       Past Medical History:        Diagnosis Date    Arthritis     CAD (coronary artery disease)     s/p MI, CABG 2 vessels 1994    CKD stage 2 due to type 2 diabetes mellitus (HCC)     secondary to obesity, aging, and DM    DM (diabetes mellitus) ([address]     DM retinopathy ([address]     mild    HTN (hypertension)     variable, improved by chaning her medication raya.  Hyperlipidemia 1994    Morbid obesity ([address]     OAB (overactive bladder)     chronic episodes of incontinence per     UTI (urinary tract infection) 07/2017    preop       Past Surgical History:        Procedure Laterality Date    CORONARY ARTERY BYPASS GRAFT  1995    s/p two-vessel [place]    HYSTERECTOMY  1994    KNEE ARTHROSCOPY Right ?     Dr. Ezequiel Ortega Right     TUMOR REMOVAL  1994    ovarian       Social History:    Social History   Substance Use Topics    Smoking status: Never Smoker    Smokeless tobacco: Never Used    Alcohol use No                                Counseling given: Not Answered      Vital Signs (Current):   Vitals:    03/16/18 1122   Weight: 238 lb (108 kg)   Height: 5' 1\" (1.549 m)                                              BP Readings from Last 3 Encounters:   03/08/18 (!) 148/70   02/23/18 (!) 108/52   01/31/18 114/64       NPO Status:                                                                                 BMI:   Wt Readings from Last 3 Encounters:   03/16/18 238 lb (108 kg)   03/08/18 228 lb (103.4 kg)   02/23/18 250 lb 1.6 oz (113.4 kg)     Body mass index is 44.97 kg/m². CBC:   Lab Results   Component Value Date    WBC 8.6 01/20/2018    RBC 4.53 01/20/2018    HGB 13.2 01/20/2018    HCT 41.0 01/20/2018    MCV 90.5 01/20/2018    RDW 15.8 01/20/2018     01/20/2018       CMP:   Lab Results   Component Value Date     01/20/2018    K 5.4 01/20/2018     01/20/2018    CO2 18 01/20/2018    BUN <5 01/20/2018    CREATININE 1.21 01/20/2018    LABGLOM 54 12/14/2017    GLUCOSE 188 01/20/2018    PROT 5.6 01/20/2018    CALCIUM 8.7 01/20/2018    BILITOT 0.2 01/20/2018    ALKPHOS 53 01/20/2018    AST 20 01/20/2018    ALT 14 01/20/2018       POC Tests: No results for input(s): POCGLU, POCNA, POCK, POCCL, POCBUN, POCHEMO, POCHCT in the last 72 hours.     Coags: No results found for: PROTIME, INR, APTT    HCG (If Applicable): No results found for: PREGTESTUR, PREGSERUM, HCG, HCGQUANT     ABGs: No results found for: PHART, PO2ART, YID1PHE, BFA4JPE, BEART, W0OVXJTF     Type & Screen (If Applicable):  No results found for: LABABO, LABRH    Anesthesia Evaluation    Airway: Mallampati: III  TM distance: >3 FB   Neck ROM: full  Mouth opening: > = 3 FB Dental:          Pulmonary:   (+) decreased breath sounds,                             Cardiovascular:    (+) hypertension:, CAD:, CABG/stent:,         Rhythm: regular                      Neuro/Psych:   (+) psychiatric history:            GI/Hepatic/Renal:   (+) renal disease:,           Endo/Other:    (+) Diabetes, . Abdominal:   (+) obese,         Vascular:                                        Anesthesia Plan      general     ASA 4     (PT. MOHERSON AND GRUNTS  ACCORDING TO PT.'S  HYPERSOMNIA)  Induction: intravenous. MIPS: Postoperative opioids intended, Prophylactic antiemetics administered and Postoperative trial extubation. Anesthetic plan and risks discussed with patient and spouse. Plan discussed with CRNA.                   Tara Heath MD   3/21/2018

## 2018-03-21 NOTE — ANESTHESIA POSTPROCEDURE EVALUATION
Department of Anesthesiology  Postprocedure Note    Patient: Yamile Huggins  MRN: [id number]  YOB: 1940  Date of evaluation: 3/21/2018  Time:  3:00 PM     Procedure Summary     Date:  03/21/18 Room / Location:  [address] [place] [address] MELANIE Li    Anesthesia Start:  1146 Anesthesia Stop:  9740    Procedure:  ROBOT ASSISTED LAPAROSCOPIC LEFT RADICAL NEPHRECTOMY (Left Flank) Diagnosis:  (LEFT KIDNEY MASS)    Surgeon:  Sundeep Vera MD Responsible Provider:  Lyndsay Montelongo DO    Anesthesia Type:  general ASA Status:  4          Anesthesia Type: general    Mars Phase I: Mars Score: 8    Mars Phase II:      Last vitals: Reviewed and per EMR flowsheets.        Anesthesia Post Evaluation    Patient location during evaluation: PACU  Patient participation: complete - patient participated  Level of consciousness: awake and alert  Airway patency: patent  Nausea & Vomiting: no nausea and no vomiting  Complications: no  Cardiovascular status: hemodynamically stable  Respiratory status: acceptable  Hydration status: stable

## 2018-03-22 LAB
ANION GAP SERPL CALCULATED.3IONS-SCNC: 10 MEQ/L (ref 8–16)
BASOPHILS # BLD: 0.4 %
BASOPHILS ABSOLUTE: 0 THOU/MM3 (ref 0–0.1)
BUN BLDV-MCNC: 20 MG/DL (ref 7–22)
CALCIUM SERPL-MCNC: 8.4 MG/DL (ref 8.5–10.5)
CHLORIDE BLD-SCNC: 106 MEQ/L (ref 98–111)
CO2: 25 MEQ/L (ref 23–33)
CREAT SERPL-MCNC: 1 MG/DL (ref 0.4–1.2)
EOSINOPHIL # BLD: 0 %
EOSINOPHILS ABSOLUTE: 0 THOU/MM3 (ref 0–0.4)
GFR SERPL CREATININE-BSD FRML MDRD: 54 ML/MIN/1.73M2
GLUCOSE BLD-MCNC: 111 MG/DL (ref 70–108)
GLUCOSE BLD-MCNC: 132 MG/DL (ref 70–108)
GLUCOSE BLD-MCNC: 134 MG/DL (ref 70–108)
GLUCOSE BLD-MCNC: 178 MG/DL (ref 70–108)
GLUCOSE BLD-MCNC: 179 MG/DL (ref 70–108)
HCT VFR BLD CALC: 33.9 % (ref 37–47)
HEMOGLOBIN: 11 GM/DL (ref 12–16)
LYMPHOCYTES # BLD: 9.6 %
LYMPHOCYTES ABSOLUTE: 1.2 THOU/MM3 (ref 1–4.8)
MCH RBC QN AUTO: 28.6 PG (ref 27–31)
MCHC RBC AUTO-ENTMCNC: 32.3 GM/DL (ref 33–37)
MCV RBC AUTO: 88.5 FL (ref 81–99)
MONOCYTES # BLD: 10.1 %
MONOCYTES ABSOLUTE: 1.2 THOU/MM3 (ref 0.4–1.3)
NUCLEATED RED BLOOD CELLS: 0 /100 WBC
PDW BLD-RTO: 14.2 % (ref 11.5–14.5)
PLATELET # BLD: 145 THOU/MM3 (ref 130–400)
PMV BLD AUTO: 10.4 FL (ref 7.4–10.4)
POTASSIUM REFLEX MAGNESIUM: 5.2 MEQ/L (ref 3.5–5.2)
RBC # BLD: 3.83 MILL/MM3 (ref 4.2–5.4)
SEG NEUTROPHILS: 79.9 %
SEGMENTED NEUTROPHILS ABSOLUTE COUNT: 9.7 THOU/MM3 (ref 1.8–7.7)
SODIUM BLD-SCNC: 141 MEQ/L (ref 135–145)
WBC # BLD: 12.1 THOU/MM3 (ref 4.8–10.8)

## 2018-03-22 PROCEDURE — 85025 COMPLETE CBC W/AUTO DIFF WBC: CPT

## 2018-03-22 PROCEDURE — 97162 PT EVAL MOD COMPLEX 30 MIN: CPT

## 2018-03-22 PROCEDURE — 80048 BASIC METABOLIC PNL TOTAL CA: CPT

## 2018-03-22 PROCEDURE — 6370000000 HC RX 637 (ALT 250 FOR IP): Performed by: PHYSICIAN ASSISTANT

## 2018-03-22 PROCEDURE — 36415 COLL VENOUS BLD VENIPUNCTURE: CPT

## 2018-03-22 PROCEDURE — 97110 THERAPEUTIC EXERCISES: CPT

## 2018-03-22 PROCEDURE — 6370000000 HC RX 637 (ALT 250 FOR IP): Performed by: NURSE PRACTITIONER

## 2018-03-22 PROCEDURE — 2580000003 HC RX 258: Performed by: PHYSICIAN ASSISTANT

## 2018-03-22 PROCEDURE — 99024 POSTOP FOLLOW-UP VISIT: CPT | Performed by: NURSE PRACTITIONER

## 2018-03-22 PROCEDURE — APPNB30 APP NON BILLABLE TIME 0-30 MINS: Performed by: NURSE PRACTITIONER

## 2018-03-22 PROCEDURE — 97530 THERAPEUTIC ACTIVITIES: CPT

## 2018-03-22 PROCEDURE — 1200000000 HC SEMI PRIVATE

## 2018-03-22 PROCEDURE — G8979 MOBILITY GOAL STATUS: HCPCS

## 2018-03-22 PROCEDURE — 2700000000 HC OXYGEN THERAPY PER DAY

## 2018-03-22 PROCEDURE — 6360000002 HC RX W HCPCS: Performed by: PHYSICIAN ASSISTANT

## 2018-03-22 PROCEDURE — G8978 MOBILITY CURRENT STATUS: HCPCS

## 2018-03-22 PROCEDURE — 82948 REAGENT STRIP/BLOOD GLUCOSE: CPT

## 2018-03-22 RX ADMIN — Medication 10 ML: at 08:01

## 2018-03-22 RX ADMIN — AMLODIPINE BESYLATE 10 MG: 10 TABLET ORAL at 07:53

## 2018-03-22 RX ADMIN — MORPHINE SULFATE 4 MG: 4 INJECTION INTRAVENOUS at 11:35

## 2018-03-22 RX ADMIN — HYDRALAZINE HYDROCHLORIDE 50 MG: 50 TABLET, FILM COATED ORAL at 20:22

## 2018-03-22 RX ADMIN — FAMOTIDINE 20 MG: 20 TABLET, FILM COATED ORAL at 07:53

## 2018-03-22 RX ADMIN — ATORVASTATIN CALCIUM 40 MG: 40 TABLET, FILM COATED ORAL at 20:22

## 2018-03-22 RX ADMIN — HYDRALAZINE HYDROCHLORIDE 50 MG: 50 TABLET, FILM COATED ORAL at 07:53

## 2018-03-22 RX ADMIN — SODIUM CHLORIDE: 9 INJECTION, SOLUTION INTRAVENOUS at 07:35

## 2018-03-22 RX ADMIN — DOCUSATE SODIUM 100 MG: 100 CAPSULE ORAL at 20:22

## 2018-03-22 RX ADMIN — DOCUSATE SODIUM 100 MG: 100 CAPSULE ORAL at 07:53

## 2018-03-22 RX ADMIN — MAGNESIUM HYDROXIDE 30 ML: 400 SUSPENSION ORAL at 11:50

## 2018-03-22 RX ADMIN — INSULIN GLARGINE 45 UNITS: 100 INJECTION, SOLUTION SUBCUTANEOUS at 08:07

## 2018-03-22 RX ADMIN — POTASSIUM CHLORIDE 20 MEQ: 1500 TABLET, EXTENDED RELEASE ORAL at 07:54

## 2018-03-22 RX ADMIN — BACITRACIN ZINC NEOMYCIN SULFATE POLYMYXIN B SULFATE: 400; 3.5; 5 OINTMENT TOPICAL at 08:01

## 2018-03-22 RX ADMIN — SERTRALINE 50 MG: 50 TABLET, FILM COATED ORAL at 07:56

## 2018-03-22 RX ADMIN — SOLIFENACIN SUCCINATE 10 MG: 10 TABLET, FILM COATED ORAL at 07:51

## 2018-03-22 RX ADMIN — Medication 10 ML: at 20:23

## 2018-03-22 RX ADMIN — CEFOXITIN SODIUM 2 G: 2 POWDER, FOR SOLUTION INTRAVENOUS at 03:15

## 2018-03-22 RX ADMIN — HYDROCHLOROTHIAZIDE 25 MG: 25 TABLET ORAL at 10:44

## 2018-03-22 RX ADMIN — METOPROLOL SUCCINATE 100 MG: 100 TABLET, EXTENDED RELEASE ORAL at 07:54

## 2018-03-22 RX ADMIN — LISINOPRIL 20 MG: 40 TABLET ORAL at 07:54

## 2018-03-22 ASSESSMENT — PAIN SCALES - GENERAL
PAINLEVEL_OUTOF10: 9
PAINLEVEL_OUTOF10: 5

## 2018-03-22 NOTE — PROGRESS NOTES
Pt resting in bed, watching television. No complaints at this time. Room free of clutter and no other needs at this time.

## 2018-03-22 NOTE — PROGRESS NOTES
Vitals obtained. Patient alert and oriented x3. Speech clear and appropriate. Mucous membranes pink and intact. Pupils equal, round and reactive to light. Pupils 3mm-2mm. Patient denies any pain at time of assessment. Respirations unlabored at rest. Patient on supplemental oxygen. Lung sounds clear throughout. Chest rise and fall symmetrical. Heart tones normal. Upper extremities warm, pink and dry. Patient denies any numbness or tingling. Capillary refill and skin turgor <3 seconds. Abdomen soft and flat. Dressings on 5 lower left abdomen incisions clean, dry and intact. Bowel sounds active in all 4 quadrants. Lower extremities warm, pink and dry. Patient denied any numbness or tingling in lower extremities. Hand grasp strong and equal bilaterally. Pneumatic compression devices present on lower legs. Pedal push and pull strong and equal bilaterally. +2 pitting edema present below left ankle. Foot swollen below right ankle without edema present. Unable to assess pedal pulses due to swelling and edema.      Riddhi Knox RSC/PNS

## 2018-03-22 NOTE — PROGRESS NOTES
Urology Progress Note    Chief Complaint: atrophic left kidney, left staghorn calculus, recurrent UTIs. Subjective:     Patient is resting in bed, garibay draining clear yellow urine, no flatus, no BM, tolerating regular diet, denies any nausea or vomiting. Reports incisional discomfort with movement. Denies increased leg swelling or calf pain. No Chest pain or shortness of breath. Uses walker to ambulate at home. Hasn't ambulated in the guidry yet. Lives with . Vitals:  BP (!) 119/56   Pulse 65   Temp 97.6 °F (36.4 °C) (Oral)   Resp 19   Ht 5' 1\" (1.549 m)   Wt 238 lb (108 kg)   SpO2 96%   BMI 44.97 kg/m²   Temp  Av.2 °F (35.7 °C)  Min: 95.9 °F (35.5 °C)  Max: 99.2 °F (37.3 °C)    Intake/Output Summary (Last 24 hours) at 18 1037  Last data filed at 18   Gross per 24 hour   Intake          3340.41 ml   Output              505 ml   Net          2835.41 ml       Social History     Social History    Marital status:      Spouse name: N/A    Number of children: N/A    Years of education: N/A     Occupational History    Not on file. Social History Main Topics    Smoking status: Never Smoker    Smokeless tobacco: Never Used    Alcohol use No    Drug use: No    Sexual activity: Not on file     Other Topics Concern    Not on file     Social History Narrative    No narrative on file     Family History   Problem Relation Age of Onset    Arthritis Mother     Coronary Art Dis Mother     Diabetes Mother     High Blood Pressure Mother     Other Mother      TIA's    Cancer Father     Stroke Father     Cancer Paternal Grandmother      No Known Allergies      Constitutional: Alert and oriented times x3, no acute distress, and cooperative to examination with appropriate mood and affect. HEENT:   Head:         Normocephalic and atraumatic. Mucous membranes are normal.   Eyes:         EOM are normal. No scleral icterus.   Nose:    The external

## 2018-03-22 NOTE — PROGRESS NOTES
[address] [address]  INPATIENT PHYSICAL THERAPY  EVALUATION  [facility] SURGICAL 5E - 5E-54/054-A    Time In: 7307  Time Out: 9783  Timed Code Treatment Minutes: 25 Minutes  Minutes: 39          Date: 3/22/2018  Patient Name: Alaina Lakhani,  Gender:  female        MRN: [id number]  : 1940  (78 y.o.)      Referring Practitioner: Philip Lawrence CNP  Diagnosis: Left kidney mass  Additional Pertinent Hx: Pt admitted s/p L nephrectomy 3/21. Pt with h/o R TKA in 2017. Past Medical History:   Diagnosis Date    Arthritis     CAD (coronary artery disease)     s/p MI, CABG 2 vessels     CKD stage 2 due to type 2 diabetes mellitus (HCC)     secondary to obesity, aging, and DM    DM (diabetes mellitus) ([address]     DM retinopathy ([facility]     mild    HTN (hypertension)     variable, improved by chaning her medication raya.  Hyperlipidemia     Morbid obesity ([facility]     OAB (overactive bladder)     chronic episodes of incontinence per     UTI (urinary tract infection) 2017    preop     Past Surgical History:   Procedure Laterality Date    CORONARY ARTERY BYPASS GRAFT      s/p two-vessel [place]    HYSTERECTOMY      KNEE ARTHROSCOPY Right ? Dr. Reinaldo Carr LAP, RADICAL NEPHRECTOMY Left 3/21/2018    ROBOT ASSISTED LAPAROSCOPIC LEFT RADICAL NEPHRECTOMY performed by Erick Flores MD at [facility] 32 Right     TUMOR REMOVAL      ovarian       Restrictions/Precautions:  General Precautions, Fall Risk      Other position/activity restrictions: Confusion     Subjective:  Chart Reviewed: Yes  Patient assessed for rehabilitation services?: Yes  Family / Caregiver Present: Yes ()  Subjective: RN approved session, pt is up in chair and agreeable. RN states they used [address] to assist pt to chair. Pt confused, history provided by .     General:  Overall Orientation Status: Impaired  Orientation Level: Oriented to person, has been home only 1 week since November, has been in/out of the hospital and SNF for rehab. Pt demonstrates decreased strength, transfers, balance, activity tolerance and gait indicating the need for skilled PT services. Decision Making: High Complexitybased on patient assessment and decision making process of determining plan of care and establishing reasonable expectations for measurable functional outcomes    REQUIRES PT FOLLOW UP: Yes  Discharge Recommendations: ECF with PT    Patient Education:  Patient Education: POC, gait training    Equipment Recommendations:  Equipment Needed: No    Safety:  Type of devices: All fall risk precautions in place, Call light within reach, Chair alarm in place, Gait belt, Nurse notified, Left in chair, Patient at risk for falls ( present)  Restraints  Initially in place: No    Plan:  Times per week: 5 X GM  Times per day: Daily  Specific instructions for Next Treatment: B LE strengthening, bed mobility, transfer training, gait training, balance training. Current Treatment Recommendations: Strengthening, Gait Training, Patient/Caregiver Education & Training, Stair training, Balance Training, Functional Mobility Training, Endurance Training, Transfer Training, Safety Education & Training, Home Exercise Program    Goals:  Patient goals : To go home. Short term goals  Time Frame for Short term goals: 2 weeks  Short term goal 1: Pt to transfer supine <--> sit SBA to enable pt to get in/out of bed. Short term goal 2: Pt to transfer sit <--> stand SBA for increased functional mobility. Short term goal 3: Pt to ambulate 50 feet with CGA for household ambulation. Short term goal 4: Pt to ascend/descend 3 steps with B HR CGA for home entry. Long term goals  Time Frame for Long term goals : NA due to short length of stay.     Evaluation Complexity: Based on the findings of patient history, examination, clinical presentation, and decision making during this evaluation, the

## 2018-03-22 NOTE — PROGRESS NOTES
Took patients vitals, was falling asleep in chair with call light in reach. Needed nothing further at this time.

## 2018-03-22 NOTE — PROGRESS NOTES
Took blood sugar, reading at 132. Administered prescribed medication. No humaLOG given due to low blood sugar. No other needs at this time. Call light in place.

## 2018-03-23 ENCOUNTER — APPOINTMENT (OUTPATIENT)
Dept: CT IMAGING | Age: 78
DRG: 659 | End: 2018-03-23
Attending: UROLOGY
Payer: MEDICARE

## 2018-03-23 ENCOUNTER — APPOINTMENT (OUTPATIENT)
Dept: GENERAL RADIOLOGY | Age: 78
DRG: 659 | End: 2018-03-23
Attending: UROLOGY
Payer: MEDICARE

## 2018-03-23 PROBLEM — G89.18: Status: ACTIVE | Noted: 2018-03-23

## 2018-03-23 PROBLEM — G93.40 ENCEPHALOPATHY ACUTE: Status: ACTIVE | Noted: 2018-03-23

## 2018-03-23 LAB
ALLEN TEST: POSITIVE
ANION GAP SERPL CALCULATED.3IONS-SCNC: 11 MEQ/L (ref 8–16)
BASE EXCESS (CALCULATED): 1.3 MMOL/L (ref -2.5–2.5)
BASOPHILS # BLD: 0.2 %
BASOPHILS ABSOLUTE: 0 THOU/MM3 (ref 0–0.1)
BUN BLDV-MCNC: 17 MG/DL (ref 7–22)
CALCIUM SERPL-MCNC: 9 MG/DL (ref 8.5–10.5)
CHLORIDE BLD-SCNC: 99 MEQ/L (ref 98–111)
CO2: 27 MEQ/L (ref 23–33)
COLLECTED BY:: ABNORMAL
CREAT SERPL-MCNC: 0.8 MG/DL (ref 0.4–1.2)
DEVICE: ABNORMAL
EKG ATRIAL RATE: 63 BPM
EKG P AXIS: 69 DEGREES
EKG P-R INTERVAL: 184 MS
EKG Q-T INTERVAL: 420 MS
EKG QRS DURATION: 146 MS
EKG QTC CALCULATION (BAZETT): 429 MS
EKG R AXIS: 51 DEGREES
EKG T AXIS: 44 DEGREES
EKG VENTRICULAR RATE: 63 BPM
EOSINOPHIL # BLD: 0.2 %
EOSINOPHILS ABSOLUTE: 0 THOU/MM3 (ref 0–0.4)
GFR SERPL CREATININE-BSD FRML MDRD: 70 ML/MIN/1.73M2
GLUCOSE BLD-MCNC: 120 MG/DL (ref 70–108)
GLUCOSE BLD-MCNC: 135 MG/DL (ref 70–108)
GLUCOSE BLD-MCNC: 139 MG/DL (ref 70–108)
GLUCOSE BLD-MCNC: 156 MG/DL (ref 70–108)
HCO3: 28 MMOL/L (ref 23–28)
HCT VFR BLD CALC: 35.6 % (ref 37–47)
HEMOGLOBIN: 11.5 GM/DL (ref 12–16)
IFIO2: 2
LV EF: 63 %
LVEF MODALITY: NORMAL
LYMPHOCYTES # BLD: 8.4 %
LYMPHOCYTES ABSOLUTE: 1 THOU/MM3 (ref 1–4.8)
MCH RBC QN AUTO: 28.6 PG (ref 27–31)
MCHC RBC AUTO-ENTMCNC: 32.3 GM/DL (ref 33–37)
MCV RBC AUTO: 88.4 FL (ref 81–99)
MONOCYTES # BLD: 14.1 %
MONOCYTES ABSOLUTE: 1.6 THOU/MM3 (ref 0.4–1.3)
NUCLEATED RED BLOOD CELLS: 0 /100 WBC
O2 SATURATION: 92 %
PCO2: 53 MMHG (ref 35–45)
PDW BLD-RTO: 14.5 % (ref 11.5–14.5)
PH BLOOD GAS: 7.33 (ref 7.35–7.45)
PLATELET # BLD: 160 THOU/MM3 (ref 130–400)
PMV BLD AUTO: 10.9 FL (ref 7.4–10.4)
PO2: 70 MMHG (ref 71–104)
POTASSIUM SERPL-SCNC: 4.9 MEQ/L (ref 3.5–5.2)
RBC # BLD: 4.02 MILL/MM3 (ref 4.2–5.4)
SEG NEUTROPHILS: 77.1 %
SEGMENTED NEUTROPHILS ABSOLUTE COUNT: 8.8 THOU/MM3 (ref 1.8–7.7)
SODIUM BLD-SCNC: 137 MEQ/L (ref 135–145)
SOURCE, BLOOD GAS: ABNORMAL
TROPONIN T: < 0.01 NG/ML
TROPONIN T: < 0.01 NG/ML
WBC # BLD: 11.4 THOU/MM3 (ref 4.8–10.8)

## 2018-03-23 PROCEDURE — 93306 TTE W/DOPPLER COMPLETE: CPT

## 2018-03-23 PROCEDURE — 85025 COMPLETE CBC W/AUTO DIFF WBC: CPT

## 2018-03-23 PROCEDURE — 97535 SELF CARE MNGMENT TRAINING: CPT

## 2018-03-23 PROCEDURE — 70496 CT ANGIOGRAPHY HEAD: CPT

## 2018-03-23 PROCEDURE — 6360000002 HC RX W HCPCS: Performed by: NURSE PRACTITIONER

## 2018-03-23 PROCEDURE — 99222 1ST HOSP IP/OBS MODERATE 55: CPT | Performed by: INTERNAL MEDICINE

## 2018-03-23 PROCEDURE — 6360000004 HC RX CONTRAST MEDICATION: Performed by: INTERNAL MEDICINE

## 2018-03-23 PROCEDURE — 36600 WITHDRAWAL OF ARTERIAL BLOOD: CPT

## 2018-03-23 PROCEDURE — 6370000000 HC RX 637 (ALT 250 FOR IP): Performed by: PHYSICIAN ASSISTANT

## 2018-03-23 PROCEDURE — G8988 SELF CARE GOAL STATUS: HCPCS

## 2018-03-23 PROCEDURE — 2580000003 HC RX 258: Performed by: INTERNAL MEDICINE

## 2018-03-23 PROCEDURE — 93005 ELECTROCARDIOGRAM TRACING: CPT | Performed by: INTERNAL MEDICINE

## 2018-03-23 PROCEDURE — 80048 BASIC METABOLIC PNL TOTAL CA: CPT

## 2018-03-23 PROCEDURE — B3251ZZ COMPUTERIZED TOMOGRAPHY (CT SCAN) OF BILATERAL COMMON CAROTID ARTERIES USING LOW OSMOLAR CONTRAST: ICD-10-PCS | Performed by: RADIOLOGY

## 2018-03-23 PROCEDURE — 82948 REAGENT STRIP/BLOOD GLUCOSE: CPT

## 2018-03-23 PROCEDURE — 2700000000 HC OXYGEN THERAPY PER DAY

## 2018-03-23 PROCEDURE — 97110 THERAPEUTIC EXERCISES: CPT

## 2018-03-23 PROCEDURE — 2140000000 HC CCU INTERMEDIATE R&B

## 2018-03-23 PROCEDURE — 6360000002 HC RX W HCPCS: Performed by: INTERNAL MEDICINE

## 2018-03-23 PROCEDURE — 71045 X-RAY EXAM CHEST 1 VIEW: CPT

## 2018-03-23 PROCEDURE — 82803 BLOOD GASES ANY COMBINATION: CPT

## 2018-03-23 PROCEDURE — 97167 OT EVAL HIGH COMPLEX 60 MIN: CPT

## 2018-03-23 PROCEDURE — 36415 COLL VENOUS BLD VENIPUNCTURE: CPT

## 2018-03-23 PROCEDURE — 99024 POSTOP FOLLOW-UP VISIT: CPT | Performed by: NURSE PRACTITIONER

## 2018-03-23 PROCEDURE — 70498 CT ANGIOGRAPHY NECK: CPT

## 2018-03-23 PROCEDURE — 70450 CT HEAD/BRAIN W/O DYE: CPT

## 2018-03-23 PROCEDURE — 84484 ASSAY OF TROPONIN QUANT: CPT

## 2018-03-23 PROCEDURE — G8987 SELF CARE CURRENT STATUS: HCPCS

## 2018-03-23 PROCEDURE — B32R1ZZ COMPUTERIZED TOMOGRAPHY (CT SCAN) OF INTRACRANIAL ARTERIES USING LOW OSMOLAR CONTRAST: ICD-10-PCS | Performed by: RADIOLOGY

## 2018-03-23 PROCEDURE — APPNB45 APP NON BILLABLE 31-45 MINUTES: Performed by: NURSE PRACTITIONER

## 2018-03-23 RX ORDER — NALOXONE HYDROCHLORIDE 0.4 MG/ML
INJECTION, SOLUTION INTRAMUSCULAR; INTRAVENOUS; SUBCUTANEOUS
Status: DISPENSED
Start: 2018-03-23 | End: 2018-03-23

## 2018-03-23 RX ORDER — SODIUM CHLORIDE 9 MG/ML
INJECTION, SOLUTION INTRAVENOUS ONCE
Status: COMPLETED | OUTPATIENT
Start: 2018-03-23 | End: 2018-03-23

## 2018-03-23 RX ORDER — NALOXONE HYDROCHLORIDE 0.4 MG/ML
0.4 INJECTION, SOLUTION INTRAMUSCULAR; INTRAVENOUS; SUBCUTANEOUS ONCE
Status: COMPLETED | OUTPATIENT
Start: 2018-03-23 | End: 2018-03-23

## 2018-03-23 RX ORDER — NALOXONE HYDROCHLORIDE 0.4 MG/ML
0.4 INJECTION, SOLUTION INTRAMUSCULAR; INTRAVENOUS; SUBCUTANEOUS PRN
Status: DISCONTINUED | OUTPATIENT
Start: 2018-03-23 | End: 2018-03-26 | Stop reason: HOSPADM

## 2018-03-23 RX ADMIN — POTASSIUM CHLORIDE 20 MEQ: 1500 TABLET, EXTENDED RELEASE ORAL at 08:19

## 2018-03-23 RX ADMIN — LISINOPRIL 40 MG: 40 TABLET ORAL at 08:18

## 2018-03-23 RX ADMIN — SOLIFENACIN SUCCINATE 10 MG: 10 TABLET, FILM COATED ORAL at 08:20

## 2018-03-23 RX ADMIN — DOCUSATE SODIUM 100 MG: 100 CAPSULE ORAL at 08:15

## 2018-03-23 RX ADMIN — AMLODIPINE BESYLATE 10 MG: 10 TABLET ORAL at 08:17

## 2018-03-23 RX ADMIN — HYDRALAZINE HYDROCHLORIDE 50 MG: 50 TABLET, FILM COATED ORAL at 20:06

## 2018-03-23 RX ADMIN — SERTRALINE 50 MG: 50 TABLET, FILM COATED ORAL at 08:19

## 2018-03-23 RX ADMIN — DOCUSATE SODIUM 100 MG: 100 CAPSULE ORAL at 21:32

## 2018-03-23 RX ADMIN — HYDROCHLOROTHIAZIDE 25 MG: 25 TABLET ORAL at 08:17

## 2018-03-23 RX ADMIN — SODIUM CHLORIDE: 9 INJECTION, SOLUTION INTRAVENOUS at 14:38

## 2018-03-23 RX ADMIN — IOPAMIDOL 80 ML: 755 INJECTION, SOLUTION INTRAVENOUS at 12:00

## 2018-03-23 RX ADMIN — NALOXONE HYDROCHLORIDE 0.4 MG: 0.4 INJECTION, SOLUTION INTRAMUSCULAR; INTRAVENOUS; SUBCUTANEOUS at 10:40

## 2018-03-23 RX ADMIN — HYDRALAZINE HYDROCHLORIDE 50 MG: 50 TABLET, FILM COATED ORAL at 08:16

## 2018-03-23 RX ADMIN — ACETAMINOPHEN 650 MG: 325 TABLET ORAL at 20:09

## 2018-03-23 RX ADMIN — INSULIN GLARGINE 45 UNITS: 100 INJECTION, SOLUTION SUBCUTANEOUS at 08:44

## 2018-03-23 RX ADMIN — NALOXONE HYDROCHLORIDE 0.4 MG: 0.4 INJECTION, SOLUTION INTRAMUSCULAR; INTRAVENOUS; SUBCUTANEOUS at 10:30

## 2018-03-23 RX ADMIN — HYDROCODONE BITARTRATE AND ACETAMINOPHEN 1 TABLET: 5; 325 TABLET ORAL at 08:13

## 2018-03-23 RX ADMIN — ATORVASTATIN CALCIUM 40 MG: 40 TABLET, FILM COATED ORAL at 20:05

## 2018-03-23 RX ADMIN — FAMOTIDINE 20 MG: 20 TABLET, FILM COATED ORAL at 08:15

## 2018-03-23 RX ADMIN — METOPROLOL SUCCINATE 100 MG: 100 TABLET, EXTENDED RELEASE ORAL at 08:18

## 2018-03-23 ASSESSMENT — PAIN SCALES - WONG BAKER
WONGBAKER_NUMERICALRESPONSE: 6
WONGBAKER_NUMERICALRESPONSE: 6

## 2018-03-23 ASSESSMENT — PAIN DESCRIPTION - ORIENTATION: ORIENTATION: LEFT;LOWER;ANTERIOR

## 2018-03-23 ASSESSMENT — PAIN DESCRIPTION - PAIN TYPE
TYPE: ACUTE PAIN;SURGICAL PAIN
TYPE: ACUTE PAIN;SURGICAL PAIN

## 2018-03-23 ASSESSMENT — PAIN DESCRIPTION - PROGRESSION
CLINICAL_PROGRESSION: NOT CHANGED
CLINICAL_PROGRESSION: NOT CHANGED

## 2018-03-23 ASSESSMENT — PAIN DESCRIPTION - LOCATION: LOCATION: ABDOMEN;SHOULDER

## 2018-03-23 ASSESSMENT — PAIN SCALES - GENERAL
PAINLEVEL_OUTOF10: 6
PAINLEVEL_OUTOF10: 3
PAINLEVEL_OUTOF10: 2
PAINLEVEL_OUTOF10: 5
PAINLEVEL_OUTOF10: 6
PAINLEVEL_OUTOF10: 0
PAINLEVEL_OUTOF10: 7

## 2018-03-23 ASSESSMENT — PAIN DESCRIPTION - FREQUENCY: FREQUENCY: CONTINUOUS

## 2018-03-23 ASSESSMENT — PAIN DESCRIPTION - DESCRIPTORS: DESCRIPTORS: ACHING;TENDER;SHARP

## 2018-03-23 NOTE — PROGRESS NOTES
She attempted to use the commode but did not urinate further. She agreed to sit in the chair for breakfast.  Lethargy noted. Pt C/O severe pain in her L abdomen. C/O tenderness with her L anterior shoulder to the touch. Pt was given a pain med before doing the transfers. General:  Overall Orientation Status: Impaired  Orientation Level: Oriented to person, Disoriented to place, Disoriented to time, Disoriented to situation    Vision: Impaired    Hearing: Within functional limits         Pain:  Pain Assessment  Patient Currently in Pain: Yes  Pain Assessment: Faces  Victor-Baker Pain Rating: Hurts even more  Pain Level: 7  Pain Type: Acute pain;Surgical pain  Pain Location: Abdomen; Shoulder  Pain Orientation: Left; Lower; Anterior  Pain Descriptors: Aching;Tender; Sharp  Pain Frequency: Continuous  Clinical Progression: Not changed  Patient's Stated Pain Goal: No pain  Pain Intervention(s): Rest;Repositioned;Medication (see eMar)  Response to Pain Intervention: Patient Satisfied  Multiple Pain Sites: No       Social/Functional:  Lives With: Spouse  Type of Home: House  Home Layout: One level  Home Access: Stairs to enter with rails  Entrance Stairs - Number of Steps: 3  Entrance Stairs - Rails: Both  Home Equipment: Rolling walker, Wheelchair-manual (adjustable bed without bedrails)     IADL Comments: Pt had help with lower body ADLs and with all of the homemaking PTA. Receives Help From: Family  ADL Assistance: Needs assistance  Bath: Moderate assistance  Dressing:  Moderate assistance  Toileting: Needs assistance  Homemaking Assistance: Needs assistance  Meal Prep: Maximal  Laundry: Maximal  Vacuuming: Maximal  Cleaning: Maximal  Gardening: Maximal  Yard Work: Maximal  Driving: Maximal  Shopping: Maximal  Homemaking Responsibilities: No    Ambulation Assistance: Independent  Transfer Assistance: Independent    Active : No  Occupation: Retired  Leisure & Hobbies: Reading, watching TV  Additional Comments: Per , pt had R TKA in Nov 2017, went to [facility] for rehab, back to the hospital and then to [facility] for rehab. Pt has been home ~1 week since this admission.  reports pt was amb with RW down the guidry and back at [facility], was able to walk with RW I'ly at home, I with toileting, states she was doing really well just prior to surgery. Objective  Vision - Basic Assessment  Prior Vision: Wears glasses only for reading     Overall Cognitive Status: Exceptions  Following Commands: Follows one step commands with increased time, Follows multistep commands with increased time  Attention Span: Attends with cues to redirect  Memory: Decreased recall of recent events  Problem Solving: Assistance required to generate solutions, Assistance required to identify errors made, Assistance required to implement solutions, Assistance required to correct errors made  Insights: Decreased awareness of deficits  Initiation: Requires cues for some  Cognition Comment: Extra time needed for processing and following any commands         Sensation  Overall Sensation Status: WNL    Posture: Fair (rounded shoulders and forward head noted)    Observation/Palpation  Posture: Fair (rounded shoulders and forward head noted)    Hand Dominance: Right    LUE PROM (degrees)  LUE General PROM: Shoulder tenderness reported with flexion or external rotation/internal rotation. WFL otherwise.        LUE AROM (degrees)  LUE General AROM: Shoulder tenderness reported with flexion or external rotation/internal rotation  Left Hand PROM (degrees)  Left Hand PROM: WNL  Left Hand AROM (degrees)  Left Hand AROM: WNL    RUE PROM (degrees)  RUE PROM: WNL  RUE AROM (degrees)  RUE AROM : WNL  Right Hand PROM (degrees)  Right Hand PROM: WFL  Right Hand General PROM: MCP in her PIP jt of middle finger  Right Hand AROM (degrees)  Right Hand AROM: WFL    LUE Strength  L Hand Grasp: 4-/5  LUE Strength Comment: Shoulder NT secondary to tenderness; 4/5 biceps/triceps                RUE Strength  R Hand Grasp: 4-/5  RUE Strength Comment: 3+/5 deltoid; 3+/5 pectoral; 4/5 biceps/triceps                             Movements Are Fluid And Coordinated: No  Coordination and Movement description: Tremors, Left UE, Right UE  Comment: More shakiness noted with her R hand than with her L hand  Fine Motor Skills  Fine Motor Comment: Pt indicates that she is not strongly R handed but does things with B hands pretty well. She fed herself using her L hand. ADL  Grooming: Setup (Wiped her hands with a wet washcloth)  LE Dressing: Maximum assistance (donned velcro shoes)     Bed mobility  Supine to Sit: Moderate assistance  Scooting: Maximal assistance    Transfers  Sit to stand: Moderate assistance (From the edge of bed)  Toilet Transfers  Toilet - Technique: Ambulating  Equipment Used: Extra wide bedside commode  Toilet Transfer: Moderate assistance    Balance  Sitting Balance: Stand by assistance  Standing Balance: Minimal assistance (Forward flexed - cues needed to straighten her back while having her hands on the walker)     Time: 45 seconds  Activity: preparing to take steps  Comment: Extra time needed for processing and following any commands     Functional Mobility  Functional - Mobility Device: Rolling Walker  Assist Level: Moderate assistance  Functional Mobility Comments: Walked 3 ft from the bed to the commode with max cues to straighten her back and advance her feet. Assist provided to initiate weight shifting. Activity Tolerance:  Activity Tolerance: Patient limited by pain, Patient limited by fatigue  Activity Tolerance: Pt was using 2L of O2. She was having pain in her abdomen and L shoulder which was worse with movement. She had her legs elevated on a foot stool and was in the recliner chair at end of session.      Treatment Initiated:  Pt stood from the verbal cues for slow breathing technique to increase her endurance and pain free movement for ease of doing ADLs. Short term goal 5: Pt will be oriented to place and situation daily with min cues if needed to facilitate progress toward her discharge home and increase safety. Long term goals  Time Frame for Long term goals : None secondary to short estimated length of stay. Evaluation Complexity: Based on the findings of patient history, examination, clinical presentation, and decision making during this evaluation, this patient is of high complexity.     AM-PAC Inpatient Daily Activity Raw Score: 15  AM-PAC Inpatient ADL T-Scale Score : 34.69  ADL Inpatient CMS 0-100% Score: 56.46  ADL Inpatient CMS G-Code Modifier : CK

## 2018-03-23 NOTE — PROGRESS NOTES
1030- the pt. has arrived to 3B32. By bed. She is awake and alert. She is able to pass neuro checks. , however, she answered the year and where she was incorrectly. She able to read from chart and move limbs independently on command. She states that she is having no pain. She is leaving for CTA of head and neck. Dr. Pierre Giordano was sent a text message regarding CT scannig was completed. Dr. Ivana Oropeza was contacted via text for permission for the pt to eat. 1332- the pt. Is having an echo done at bedside.

## 2018-03-23 NOTE — OP NOTE
and the abdomen examined. Three additional robotic ports and two assistant ports were placed under direct visualization via the robotic camera. There was a robotic port on the left and right, an additional 4th arm robotic port and 2 assistant ports below, triangulating between the other ports. The robot was then wheeled in and docked and the procedure began after instruments were passed. Instrumentation included a scissor in the right and a fenestrated bipolar dissector in the left. The left colon was dropped by going lateral to the white line and the colonic mesentery was peeled of the Gerota's fascia. The hilum was approached and the renal vein and renal artery were visualized. The artery was carefully clipped twice proximally and once distally. The artery was then cut. The vein was then dealt with. It too was clipped twice distally and once proximally and then cut. The clips were placed in order to maintain both the gonadal vein on the left as well as the left adrenal vein. The additional renal attachments were then freed. The adrenal gland was left. A space was made between the adrenal gland and the kidney. A space was taken down to the posterior body wall and the kidney freed up posteriorly. Tissue off the lower pole was carefully dissected and the ureter found. The ureter was clipped and cut. With freeing of the superior attachments and the lateral attachments the kidney was then freed. It was placed toward the mid abdomen in order to allow for retrieval after we made an open incision. Sybil was placed in the resection bed. The vessel loops, suture needles and laparoscopic cigarettes were all removed. Sponge and needle counts were all correct at the end of the procedure. At this point the robotic instruments were all removed and the robot was undocked and wheeled away.      The medial port site was used to remove the Endo-catch bag and the two larger port sites were then closed using an Endo-close needle and the robotic camera for visualization. All incisions were instilled with local anesthetic. The skin incisions were then closed with skin staples. The abdomen was washed and dried and sterile dressings were applied. Rainer Singh was awakened in the operating room and transferred to the PACU in stable condition. She tolerated the procedure well. There were no complications.

## 2018-03-23 NOTE — PROGRESS NOTES
Urology Progress Note    Chief Complaint: atrophic left kidney, left staghorn calculus, recurrent UTIs. Subjective:     Pt up in chair. Somnolent. Verlie Games asleep with breakfast in front of her. Difficult to arouse.  denies hx of CVA. Nursing reports pt had right tremor yesterday and  reports tremor chronic. Nursing reports left sided lower extremity weakness noted this am when assisting pt to chair. Vitals:  BP (!) 140/65   Pulse 68   Temp 99.5 °F (37.5 °C)   Resp 18   Ht 5' 1\" (1.549 m)   Wt 238 lb (108 kg)   SpO2 92%   BMI 44.97 kg/m²   Temp  Av.7 °F (37.1 °C)  Min: 97.5 °F (36.4 °C)  Max: 99.5 °F (37.5 °C)    Intake/Output Summary (Last 24 hours) at 18 1026  Last data filed at 18 0542   Gross per 24 hour   Intake           1951.1 ml   Output              275 ml   Net           1676.1 ml       Social History     Social History    Marital status:      Spouse name: N/A    Number of children: N/A    Years of education: N/A     Occupational History    Not on file. Social History Main Topics    Smoking status: Never Smoker    Smokeless tobacco: Never Used    Alcohol use No    Drug use: No    Sexual activity: Not on file     Other Topics Concern    Not on file     Social History Narrative    No narrative on file     Family History   Problem Relation Age of Onset    Arthritis Mother     Coronary Art Dis Mother     Diabetes Mother     High Blood Pressure Mother     Other Mother      TIA's    Cancer Father     Stroke Father     Cancer Paternal Grandmother      No Known Allergies      Constitutional: Lethargic, difficult to arouse. On 1.5 L O2.     HEENT:   Head:         Normocephalic and atraumatic. Mucous membranes are normal.   Eyes:         EOM are normal. No scleral icterus. Nose:    The external appearance of the nose is normal  Ears: The ears appear normal to external inspection.    Cardiovascular:       Normal

## 2018-03-24 ENCOUNTER — APPOINTMENT (OUTPATIENT)
Dept: MRI IMAGING | Age: 78
DRG: 659 | End: 2018-03-24
Attending: UROLOGY
Payer: MEDICARE

## 2018-03-24 LAB
ANION GAP SERPL CALCULATED.3IONS-SCNC: 10 MEQ/L (ref 8–16)
BASOPHILS # BLD: 0.3 %
BASOPHILS ABSOLUTE: 0 THOU/MM3 (ref 0–0.1)
BUN BLDV-MCNC: 17 MG/DL (ref 7–22)
CALCIUM SERPL-MCNC: 8.5 MG/DL (ref 8.5–10.5)
CHLORIDE BLD-SCNC: 102 MEQ/L (ref 98–111)
CO2: 24 MEQ/L (ref 23–33)
CREAT SERPL-MCNC: 0.8 MG/DL (ref 0.4–1.2)
EOSINOPHIL # BLD: 1.2 %
EOSINOPHILS ABSOLUTE: 0.1 THOU/MM3 (ref 0–0.4)
GFR SERPL CREATININE-BSD FRML MDRD: 70 ML/MIN/1.73M2
GLUCOSE BLD-MCNC: 104 MG/DL (ref 70–108)
GLUCOSE BLD-MCNC: 118 MG/DL (ref 70–108)
GLUCOSE BLD-MCNC: 137 MG/DL (ref 70–108)
GLUCOSE BLD-MCNC: 226 MG/DL (ref 70–108)
GLUCOSE BLD-MCNC: 98 MG/DL (ref 70–108)
HCT VFR BLD CALC: 32.5 % (ref 37–47)
HEMOGLOBIN: 10.4 GM/DL (ref 12–16)
LYMPHOCYTES # BLD: 13.5 %
LYMPHOCYTES ABSOLUTE: 1.1 THOU/MM3 (ref 1–4.8)
MCH RBC QN AUTO: 28.5 PG (ref 27–31)
MCHC RBC AUTO-ENTMCNC: 32 GM/DL (ref 33–37)
MCV RBC AUTO: 88.8 FL (ref 81–99)
MONOCYTES # BLD: 14.1 %
MONOCYTES ABSOLUTE: 1.1 THOU/MM3 (ref 0.4–1.3)
NUCLEATED RED BLOOD CELLS: 0 /100 WBC
PDW BLD-RTO: 14.3 % (ref 11.5–14.5)
PLATELET # BLD: 133 THOU/MM3 (ref 130–400)
PMV BLD AUTO: 10.7 FL (ref 7.4–10.4)
POTASSIUM SERPL-SCNC: 4.4 MEQ/L (ref 3.5–5.2)
RBC # BLD: 3.66 MILL/MM3 (ref 4.2–5.4)
SEG NEUTROPHILS: 70.9 %
SEGMENTED NEUTROPHILS ABSOLUTE COUNT: 5.7 THOU/MM3 (ref 1.8–7.7)
SODIUM BLD-SCNC: 136 MEQ/L (ref 135–145)
WBC # BLD: 8 THOU/MM3 (ref 4.8–10.8)

## 2018-03-24 PROCEDURE — 85025 COMPLETE CBC W/AUTO DIFF WBC: CPT

## 2018-03-24 PROCEDURE — 99024 POSTOP FOLLOW-UP VISIT: CPT | Performed by: UROLOGY

## 2018-03-24 PROCEDURE — 36415 COLL VENOUS BLD VENIPUNCTURE: CPT

## 2018-03-24 PROCEDURE — 2140000000 HC CCU INTERMEDIATE R&B

## 2018-03-24 PROCEDURE — 93005 ELECTROCARDIOGRAM TRACING: CPT | Performed by: INTERNAL MEDICINE

## 2018-03-24 PROCEDURE — 6360000004 HC RX CONTRAST MEDICATION: Performed by: INTERNAL MEDICINE

## 2018-03-24 PROCEDURE — 80048 BASIC METABOLIC PNL TOTAL CA: CPT

## 2018-03-24 PROCEDURE — 6370000000 HC RX 637 (ALT 250 FOR IP): Performed by: PHYSICIAN ASSISTANT

## 2018-03-24 PROCEDURE — 51798 US URINE CAPACITY MEASURE: CPT

## 2018-03-24 PROCEDURE — 70553 MRI BRAIN STEM W/O & W/DYE: CPT

## 2018-03-24 PROCEDURE — A9579 GAD-BASE MR CONTRAST NOS,1ML: HCPCS | Performed by: INTERNAL MEDICINE

## 2018-03-24 PROCEDURE — 82948 REAGENT STRIP/BLOOD GLUCOSE: CPT

## 2018-03-24 PROCEDURE — 99233 SBSQ HOSP IP/OBS HIGH 50: CPT | Performed by: INTERNAL MEDICINE

## 2018-03-24 PROCEDURE — 51701 INSERT BLADDER CATHETER: CPT

## 2018-03-24 PROCEDURE — 2700000000 HC OXYGEN THERAPY PER DAY

## 2018-03-24 PROCEDURE — 2580000003 HC RX 258: Performed by: PHYSICIAN ASSISTANT

## 2018-03-24 RX ADMIN — DOCUSATE SODIUM 100 MG: 100 CAPSULE ORAL at 09:32

## 2018-03-24 RX ADMIN — HYDRALAZINE HYDROCHLORIDE 50 MG: 50 TABLET, FILM COATED ORAL at 20:05

## 2018-03-24 RX ADMIN — LISINOPRIL 40 MG: 40 TABLET ORAL at 09:30

## 2018-03-24 RX ADMIN — Medication 10 ML: at 20:06

## 2018-03-24 RX ADMIN — SOLIFENACIN SUCCINATE 10 MG: 10 TABLET, FILM COATED ORAL at 09:30

## 2018-03-24 RX ADMIN — ACETAMINOPHEN 650 MG: 325 TABLET ORAL at 09:31

## 2018-03-24 RX ADMIN — HYDRALAZINE HYDROCHLORIDE 50 MG: 50 TABLET, FILM COATED ORAL at 09:30

## 2018-03-24 RX ADMIN — ACETAMINOPHEN 650 MG: 325 TABLET ORAL at 04:47

## 2018-03-24 RX ADMIN — INSULIN GLARGINE 45 UNITS: 100 INJECTION, SOLUTION SUBCUTANEOUS at 09:34

## 2018-03-24 RX ADMIN — MAGNESIUM HYDROXIDE 30 ML: 400 SUSPENSION ORAL at 04:57

## 2018-03-24 RX ADMIN — GADOTERIDOL 20 ML: 279.3 INJECTION, SOLUTION INTRAVENOUS at 16:04

## 2018-03-24 RX ADMIN — METOPROLOL SUCCINATE 100 MG: 100 TABLET, EXTENDED RELEASE ORAL at 09:30

## 2018-03-24 RX ADMIN — AMLODIPINE BESYLATE 10 MG: 10 TABLET ORAL at 09:30

## 2018-03-24 RX ADMIN — POTASSIUM CHLORIDE 20 MEQ: 1500 TABLET, EXTENDED RELEASE ORAL at 09:30

## 2018-03-24 RX ADMIN — FAMOTIDINE 20 MG: 20 TABLET, FILM COATED ORAL at 09:32

## 2018-03-24 RX ADMIN — HYDROCHLOROTHIAZIDE 25 MG: 25 TABLET ORAL at 09:30

## 2018-03-24 RX ADMIN — SERTRALINE 50 MG: 50 TABLET, FILM COATED ORAL at 09:30

## 2018-03-24 RX ADMIN — ATORVASTATIN CALCIUM 40 MG: 40 TABLET, FILM COATED ORAL at 20:04

## 2018-03-24 RX ADMIN — DOCUSATE SODIUM 100 MG: 100 CAPSULE ORAL at 20:03

## 2018-03-24 RX ADMIN — Medication 10 ML: at 09:32

## 2018-03-24 ASSESSMENT — PAIN SCALES - GENERAL
PAINLEVEL_OUTOF10: 8
PAINLEVEL_OUTOF10: 3

## 2018-03-24 ASSESSMENT — PAIN DESCRIPTION - LOCATION: LOCATION: HEAD

## 2018-03-24 ASSESSMENT — PAIN DESCRIPTION - ONSET: ONSET: GRADUAL

## 2018-03-24 ASSESSMENT — PAIN DESCRIPTION - PAIN TYPE: TYPE: ACUTE PAIN

## 2018-03-24 ASSESSMENT — PAIN DESCRIPTION - ORIENTATION: ORIENTATION: MID

## 2018-03-24 ASSESSMENT — PAIN DESCRIPTION - DESCRIPTORS: DESCRIPTORS: HEADACHE

## 2018-03-24 ASSESSMENT — PAIN DESCRIPTION - FREQUENCY: FREQUENCY: INTERMITTENT

## 2018-03-24 NOTE — PROGRESS NOTES
Brandie Bennett RN was called and she stated the bladder scan order from yesterday was cancelled, so it was not done.     Nickie Hi, RRT

## 2018-03-24 NOTE — PROCEDURES
A Bladder scan was performed at 1356 . The patient's last void was on 03/22/18 according to patient . The residual amount was measured to be 315 ML. Report of results was given to [facility].

## 2018-03-25 LAB
ANION GAP SERPL CALCULATED.3IONS-SCNC: 11 MEQ/L (ref 8–16)
BUN BLDV-MCNC: 17 MG/DL (ref 7–22)
CALCIUM SERPL-MCNC: 8.5 MG/DL (ref 8.5–10.5)
CHLORIDE BLD-SCNC: 100 MEQ/L (ref 98–111)
CO2: 26 MEQ/L (ref 23–33)
CREAT SERPL-MCNC: 0.8 MG/DL (ref 0.4–1.2)
EKG ATRIAL RATE: 54 BPM
EKG P AXIS: 72 DEGREES
EKG P-R INTERVAL: 174 MS
EKG Q-T INTERVAL: 452 MS
EKG QRS DURATION: 138 MS
EKG QTC CALCULATION (BAZETT): 428 MS
EKG R AXIS: 52 DEGREES
EKG T AXIS: 51 DEGREES
EKG VENTRICULAR RATE: 54 BPM
GFR SERPL CREATININE-BSD FRML MDRD: 70 ML/MIN/1.73M2
GLUCOSE BLD-MCNC: 133 MG/DL (ref 70–108)
GLUCOSE BLD-MCNC: 138 MG/DL (ref 70–108)
GLUCOSE BLD-MCNC: 167 MG/DL (ref 70–108)
GLUCOSE BLD-MCNC: 218 MG/DL (ref 70–108)
GLUCOSE BLD-MCNC: 232 MG/DL (ref 70–108)
POTASSIUM SERPL-SCNC: 4.6 MEQ/L (ref 3.5–5.2)
SODIUM BLD-SCNC: 137 MEQ/L (ref 135–145)

## 2018-03-25 PROCEDURE — 80048 BASIC METABOLIC PNL TOTAL CA: CPT

## 2018-03-25 PROCEDURE — 6370000000 HC RX 637 (ALT 250 FOR IP): Performed by: PHYSICIAN ASSISTANT

## 2018-03-25 PROCEDURE — 99024 POSTOP FOLLOW-UP VISIT: CPT | Performed by: UROLOGY

## 2018-03-25 PROCEDURE — 2580000003 HC RX 258: Performed by: PHYSICIAN ASSISTANT

## 2018-03-25 PROCEDURE — 2140000000 HC CCU INTERMEDIATE R&B

## 2018-03-25 PROCEDURE — 6370000000 HC RX 637 (ALT 250 FOR IP): Performed by: INTERNAL MEDICINE

## 2018-03-25 PROCEDURE — 36415 COLL VENOUS BLD VENIPUNCTURE: CPT

## 2018-03-25 PROCEDURE — 82948 REAGENT STRIP/BLOOD GLUCOSE: CPT

## 2018-03-25 PROCEDURE — 6360000002 HC RX W HCPCS: Performed by: INTERNAL MEDICINE

## 2018-03-25 RX ORDER — ASPIRIN 81 MG/1
81 TABLET ORAL DAILY
Status: DISCONTINUED | OUTPATIENT
Start: 2018-03-25 | End: 2018-03-26 | Stop reason: HOSPADM

## 2018-03-25 RX ADMIN — ENOXAPARIN SODIUM 40 MG: 40 INJECTION SUBCUTANEOUS at 18:18

## 2018-03-25 RX ADMIN — METOPROLOL SUCCINATE 100 MG: 100 TABLET, EXTENDED RELEASE ORAL at 08:26

## 2018-03-25 RX ADMIN — Medication 4 UNITS: at 16:31

## 2018-03-25 RX ADMIN — INSULIN LISPRO 2 UNITS: 100 INJECTION, SOLUTION INTRAVENOUS; SUBCUTANEOUS at 21:01

## 2018-03-25 RX ADMIN — HYDRALAZINE HYDROCHLORIDE 50 MG: 50 TABLET, FILM COATED ORAL at 08:25

## 2018-03-25 RX ADMIN — FAMOTIDINE 20 MG: 20 TABLET, FILM COATED ORAL at 08:24

## 2018-03-25 RX ADMIN — LISINOPRIL 40 MG: 40 TABLET ORAL at 08:26

## 2018-03-25 RX ADMIN — SERTRALINE 50 MG: 50 TABLET, FILM COATED ORAL at 08:27

## 2018-03-25 RX ADMIN — ATORVASTATIN CALCIUM 40 MG: 40 TABLET, FILM COATED ORAL at 21:05

## 2018-03-25 RX ADMIN — Medication 10 ML: at 21:02

## 2018-03-25 RX ADMIN — HYDROCHLOROTHIAZIDE 25 MG: 25 TABLET ORAL at 08:25

## 2018-03-25 RX ADMIN — DOCUSATE SODIUM 100 MG: 100 CAPSULE ORAL at 21:01

## 2018-03-25 RX ADMIN — HYDRALAZINE HYDROCHLORIDE 50 MG: 50 TABLET, FILM COATED ORAL at 21:05

## 2018-03-25 RX ADMIN — BACITRACIN ZINC NEOMYCIN SULFATE POLYMYXIN B SULFATE: 400; 3.5; 5 OINTMENT TOPICAL at 23:18

## 2018-03-25 RX ADMIN — Medication 10 ML: at 08:27

## 2018-03-25 RX ADMIN — AMLODIPINE BESYLATE 10 MG: 10 TABLET ORAL at 08:25

## 2018-03-25 RX ADMIN — POTASSIUM CHLORIDE 20 MEQ: 1500 TABLET, EXTENDED RELEASE ORAL at 08:26

## 2018-03-25 RX ADMIN — SOLIFENACIN SUCCINATE 10 MG: 10 TABLET, FILM COATED ORAL at 08:27

## 2018-03-25 RX ADMIN — INSULIN GLARGINE 45 UNITS: 100 INJECTION, SOLUTION SUBCUTANEOUS at 08:29

## 2018-03-25 RX ADMIN — DOCUSATE SODIUM 100 MG: 100 CAPSULE ORAL at 08:24

## 2018-03-25 RX ADMIN — ASPIRIN 81 MG: 81 TABLET, COATED ORAL at 21:04

## 2018-03-25 ASSESSMENT — PAIN SCALES - GENERAL: PAINLEVEL_OUTOF10: 0

## 2018-03-25 NOTE — PROGRESS NOTES
Urology Progress Note      Subjective:   Patient in bed, more alert than yesterday, conversing. Martinez removed - incontinent (as she was pre-op). Head MRI done yesterday: acute infarct on L parietal lobe. Objective:   Patient is alert, oriented, in no acute distress, and cooperative to examination with appropriate mood and affect. Vitals:  BP (!) 169/73   Pulse 60   Temp 99.2 °F (37.3 °C) (Oral)   Resp 16   Ht 5' 1\" (1.549 m)   Wt 242 lb 3.2 oz (109.9 kg)   SpO2 92%   BMI 45.76 kg/m²   Temp  Av.4 °F (36.9 °C)  Min: 98.1 °F (36.7 °C)  Max: 99.2 °F (37.3 °C)    Intake/Output Summary (Last 24 hours) at 18 1636  Last data filed at 18 1200   Gross per 24 hour   Intake             1385 ml   Output                0 ml   Net             1385 ml       Social History     Social History    Marital status:      Spouse name: N/A    Number of children: N/A    Years of education: N/A     Occupational History    Not on file. Social History Main Topics    Smoking status: Never Smoker    Smokeless tobacco: Never Used    Alcohol use No    Drug use: No    Sexual activity: Not on file     Other Topics Concern    Not on file     Social History Narrative    No narrative on file     Family History   Problem Relation Age of Onset    Arthritis Mother     Coronary Art Dis Mother     Diabetes Mother     High Blood Pressure Mother     Other Mother      TIA's    Cancer Father     Stroke Father     Cancer Paternal Grandmother      No Known Allergies      Neurological:    Alert in no acute distress.   Abdominal:          Soft, non tender, non distended  Extremities:    2+ edema     Labs:  CBC:   Recent Labs      18   1031  18   0904   WBC  11.4*  8.0   HGB  11.5*  10.4*   PLT  160  133     BMP:  Recent Labs      18   1031  18   0904  18   0312   NA  137  136  137   K  4.9  4.4  4.6   CL  99  102  100   CO2  27  24  26   BUN  17  17  17   CREATININE  0.8

## 2018-03-26 VITALS
HEART RATE: 60 BPM | BODY MASS INDEX: 47.05 KG/M2 | HEIGHT: 61 IN | TEMPERATURE: 98.1 F | WEIGHT: 249.2 LBS | RESPIRATION RATE: 18 BRPM | SYSTOLIC BLOOD PRESSURE: 165 MMHG | OXYGEN SATURATION: 95 % | DIASTOLIC BLOOD PRESSURE: 74 MMHG

## 2018-03-26 LAB
ANION GAP SERPL CALCULATED.3IONS-SCNC: 11 MEQ/L (ref 8–16)
BUN BLDV-MCNC: 12 MG/DL (ref 7–22)
CALCIUM SERPL-MCNC: 9.4 MG/DL (ref 8.5–10.5)
CHLORIDE BLD-SCNC: 102 MEQ/L (ref 98–111)
CO2: 26 MEQ/L (ref 23–33)
CREAT SERPL-MCNC: 0.6 MG/DL (ref 0.4–1.2)
GFR SERPL CREATININE-BSD FRML MDRD: > 90 ML/MIN/1.73M2
GLUCOSE BLD-MCNC: 105 MG/DL (ref 70–108)
GLUCOSE BLD-MCNC: 117 MG/DL (ref 70–108)
GLUCOSE BLD-MCNC: 147 MG/DL (ref 70–108)
POTASSIUM SERPL-SCNC: 4.3 MEQ/L (ref 3.5–5.2)
SODIUM BLD-SCNC: 139 MEQ/L (ref 135–145)

## 2018-03-26 PROCEDURE — 97110 THERAPEUTIC EXERCISES: CPT

## 2018-03-26 PROCEDURE — 2580000003 HC RX 258: Performed by: PHYSICIAN ASSISTANT

## 2018-03-26 PROCEDURE — 99232 SBSQ HOSP IP/OBS MODERATE 35: CPT | Performed by: INTERNAL MEDICINE

## 2018-03-26 PROCEDURE — 97530 THERAPEUTIC ACTIVITIES: CPT

## 2018-03-26 PROCEDURE — G8978 MOBILITY CURRENT STATUS: HCPCS

## 2018-03-26 PROCEDURE — 36415 COLL VENOUS BLD VENIPUNCTURE: CPT

## 2018-03-26 PROCEDURE — G8979 MOBILITY GOAL STATUS: HCPCS

## 2018-03-26 PROCEDURE — 80048 BASIC METABOLIC PNL TOTAL CA: CPT

## 2018-03-26 PROCEDURE — 82948 REAGENT STRIP/BLOOD GLUCOSE: CPT

## 2018-03-26 PROCEDURE — 6370000000 HC RX 637 (ALT 250 FOR IP): Performed by: PHYSICIAN ASSISTANT

## 2018-03-26 PROCEDURE — 99024 POSTOP FOLLOW-UP VISIT: CPT | Performed by: NURSE PRACTITIONER

## 2018-03-26 PROCEDURE — 6360000002 HC RX W HCPCS: Performed by: INTERNAL MEDICINE

## 2018-03-26 PROCEDURE — 6370000000 HC RX 637 (ALT 250 FOR IP): Performed by: INTERNAL MEDICINE

## 2018-03-26 RX ORDER — ASPIRIN 81 MG/1
81 TABLET ORAL DAILY
Qty: 30 TABLET | Refills: 0 | Status: SHIPPED | OUTPATIENT
Start: 2018-03-27

## 2018-03-26 RX ADMIN — SERTRALINE 50 MG: 50 TABLET, FILM COATED ORAL at 08:35

## 2018-03-26 RX ADMIN — SOLIFENACIN SUCCINATE 10 MG: 10 TABLET, FILM COATED ORAL at 08:35

## 2018-03-26 RX ADMIN — DOCUSATE SODIUM 100 MG: 100 CAPSULE ORAL at 08:31

## 2018-03-26 RX ADMIN — Medication 10 ML: at 08:31

## 2018-03-26 RX ADMIN — AMLODIPINE BESYLATE 10 MG: 10 TABLET ORAL at 08:32

## 2018-03-26 RX ADMIN — INSULIN GLARGINE 45 UNITS: 100 INJECTION, SOLUTION SUBCUTANEOUS at 08:37

## 2018-03-26 RX ADMIN — METOPROLOL SUCCINATE 100 MG: 100 TABLET, EXTENDED RELEASE ORAL at 08:34

## 2018-03-26 RX ADMIN — FAMOTIDINE 20 MG: 20 TABLET, FILM COATED ORAL at 08:31

## 2018-03-26 RX ADMIN — ENOXAPARIN SODIUM 40 MG: 40 INJECTION SUBCUTANEOUS at 06:13

## 2018-03-26 RX ADMIN — LISINOPRIL 40 MG: 40 TABLET ORAL at 08:34

## 2018-03-26 RX ADMIN — POTASSIUM CHLORIDE 20 MEQ: 1500 TABLET, EXTENDED RELEASE ORAL at 08:34

## 2018-03-26 RX ADMIN — Medication 2 UNITS: at 12:00

## 2018-03-26 RX ADMIN — HYDROCHLOROTHIAZIDE 25 MG: 25 TABLET ORAL at 08:33

## 2018-03-26 RX ADMIN — HYDRALAZINE HYDROCHLORIDE 50 MG: 50 TABLET, FILM COATED ORAL at 08:33

## 2018-03-26 RX ADMIN — ASPIRIN 81 MG: 81 TABLET, COATED ORAL at 08:31

## 2018-03-26 NOTE — PLAN OF CARE
Problem: DISCHARGE BARRIERS  Goal: Patient's continuum of care needs are met  Outcome: Ongoing  Patient is agreeable to ECF for rehab at discharge, see sw note dated 3/22.
Problem: Risk for Impaired Skin Integrity  Goal: Tissue integrity - skin and mucous membranes  Structural intactness and normal physiological function of skin and  mucous membranes. Outcome: Ongoing  Consulted to see pt today to assess buttocks. Pt getting ready to work with therapy. Therapy agreeable to assessment while pt stands. Family is present in room at this time. Pt was assisted to stand with walker, gait belt, and 2 moderate assist.  When pt was standing was able to assess buttocks. Noted to have a DTPI to both buttocks. See below for wound description and photo. Recommend to apply SPR mattress overlay to bed and protective ointment to buttocks. Unsure if wound is from bedpan or linens. There is a waffle cushion in the chair. When finished with assessment, pt was assisted to transfer to the chair. Tolerated well. Denies needs at present. Informed family and primary nurse of pressure injury. Will continue to follow. Wound 03/24/18 Buttocks Right DTPI (Active)   Wound Image   3/26/2018  9:00 AM   Wound Type Wound 3/26/2018  9:00 AM   Wound Deep tissue/Injury 3/26/2018  9:00 AM   Dressing/Treatment Moisture barrier 3/26/2018  9:00 AM   Wound Length (cm) 1.5 cm 3/26/2018  9:00 AM   Wound Width (cm) 4.5 cm 3/26/2018  9:00 AM   Wound Depth (cm)  0.0 3/26/2018  9:00 AM   Calculated Wound Size (cm^2) (l*w) 6.75 cm^2 3/26/2018  9:00 AM   Wound Assessment Dry; Intact;Edema;Painful;Light purple 3/26/2018  9:00 AM   Drainage Amount None 3/26/2018  9:00 AM   Odor None 3/26/2018  9:00 AM   Radha-wound Assessment Blanchable erythema;Clean;Dry;Edema; Intact 3/26/2018  9:00 AM   Purple%Wound Bed 100 3/26/2018  9:00 AM   Number of days: 1       Wound 03/26/18 Other (Comment) Buttocks Left DTPI (Active)   Wound Type Wound 3/26/2018  9:00 AM   Wound Deep tissue/Injury 3/26/2018  9:00 AM   Dressing/Treatment Moisture barrier 3/26/2018  9:00 AM   Wound Length (cm) 1.5 cm 3/26/2018  9:00 AM   Wound Width (cm) 3 cm
shift. Patient is alert and oriented times four. Bed to lowest position with door open. Patient care items and call light in reach. Patient uses call light appropriately for assist. Will continue to monitor. Please see fall assessment.

## 2018-03-26 NOTE — PROGRESS NOTES
[place] 3B - 3B-32/032-A    Time In: 4733  Time Out: 7478  Timed Code Treatment Minutes: 29 Minutes  Minutes: 34          Date: 3/26/2018  Patient Name: Sindy Good,  Gender:  female        MRN: [id number]  : 1940  (78 y.o.)     Referring Practitioner: Valeria Fry CNP  Diagnosis: Left kidney mass  Additional Pertinent Hx: Pt admitted s/p L nephrectomy 3/21. Pt with h/o R TKA in 2017. Patient was Code [place] on 3/23, MRI of brain on 3/25 shwoed L parietal acute infarct. Past Medical History:   Diagnosis Date    Arthritis     CAD (coronary artery disease)     s/p MI, CABG 2 vessels     CKD stage 2 due to type 2 diabetes mellitus (HCC)     secondary to obesity, aging, and DM    DM (diabetes mellitus) ([address]     DM retinopathy [address] [address]     mild    HTN (hypertension)     variable, improved by chaning her medication raya.  Hyperlipidemia     Morbid obesity ([address]     OAB (overactive bladder)     chronic episodes of incontinence per     UTI (urinary tract infection) 2017    preop     Past Surgical History:   Procedure Laterality Date    CORONARY ARTERY BYPASS GRAFT      s/p two-vessel Lena    HYSTERECTOMY      KNEE ARTHROSCOPY Right ? Dr. Merissa Juárez, RADICAL NEPHRECTOMY Left 3/21/2018    ROBOT ASSISTED LAPAROSCOPIC LEFT RADICAL NEPHRECTOMY performed by Monique Birch MD at [facility] 32 Right     TUMOR REMOVAL      ovarian       Restrictions/Precautions:  General Precautions, Fall Risk      Other position/activity restrictions: acute CVA 3/25, confusion       Subjective:     Subjective: RN approved session. Patient resting in bed, okay'd session. Patient lethargic throughout session, requiring tactile cues to keep eyes open and improve alertness. Pain:   .          Patient states 3/10 headache pain at beginning of session, then

## 2018-03-26 NOTE — PROGRESS NOTES
rhthym. No use of accessory muscles. Lungs clear bilaterally. Abdominal:          Soft. No tenderness. Active bowel sounds. Incisions are intact, well approximated  Musculoskeletal:    Decreased range of motion on the left side. He exhibits no edema or tenderness of lower extremities. Extremities:    No cyanosis, clubbing, or edema present. Neurological:    Alert and oriented. Labs:  WBC:    Lab Results   Component Value Date    WBC 8.0 03/24/2018     Hemoglobin/Hematocrit:  Lab Results   Component Value Date    HGB 10.4 03/24/2018    HCT 32.5 03/24/2018     BMP:  Lab Results   Component Value Date     03/26/2018    K 4.3 03/26/2018    K 5.2 03/22/2018     03/26/2018    CO2 26 03/26/2018    BUN 12 03/26/2018    LABALBU < 0.5 01/20/2018    CREATININE 0.6 03/26/2018    CALCIUM 9.4 03/26/2018    LABGLOM >90 03/26/2018       Surgical Pathology  FINAL DIAGNOSIS:  Left renal mass, radical nephrectomy:   Clear-cell renal cell carcinoma, WHO/ISUP grade 1, pT1a.   Examined margins are free of neoplasia.   Perihilar lymph node (1): Negative for malignancy, pN0.   Staghorn calculi. Impression:  Atrophic Left Kidney  Renal Tumor- Renal Cell Carcinoma  Acute CVA- left parietal lobe  Morbid Obesity     Plan:    POD # 5 Robot-Assisted Laparoscopic Radical Nephrectomy with Dr. Girish Cabello    Surgical Pathology discussed with Maddison Carcamouty and her  at the bedside. Plan for surveillance imaging in 6 months    Okay for aspirin from Urology standpoint. Discharge planning- Rehab at [facility]?  Okay for DC from Urology standpoint, will await okay from consultants and bed admission    NEELIMA Hughes  03/26/18 11:14 AM  Urology

## 2018-03-26 NOTE — CARE COORDINATION
3/26/18, 9:36 AM    DISCHARGE BARRIERS    KENIA Xiao spoke with patient (who was sleeping) and spouse to alert them that patient has been accepted to [facility] and that we are waiting on the pre-cert. Haily Xiao spoke with Marylynn Cooks at [facility] and she said they will need updated therapy notes for the pre-cert. Haily Xiao also talked to Marylynn Cooks about the patient being an early out, and she said once the updated therapy notes are in they would be able to decide if they can take the patient as an early out or not. Update 11:43am:  Marylynn Cooks from [facility] called and reports that the insurance approved admission to [facility]. Spoke with Asya at [facility]. Maddison Albrecht was current with their services before admission to the hospital.  Made her aware patient will be discharged to [facility] today. Update 12:23am: KENIA Xiao met with patient and spouse and alerted them of the pre-cert going through for the patient's stay at [facility] and that the patient is a potential discharge. Patient and spouse would like to be transported by [facility] at discharge.
8/25/90, 6:33 PM  Gerhardt Rathke day: 2  Location: -32/032-A Reason for admit: Left kidney mass [N28.89]     Clinical update: Pt transferred to  this am c/t somnolence, left sided LE weakness  POD # 2 Robotic assisted laparoscopic radical left nephrectomy by Dr. Yakov Perez. Pt with dramatic change in mental status this am.  Given 2 doses of Narcan IM and exam still abnormal.  Code stroke initiated and evaluation of neurologic status per stroke team. Hospitalist consulted and pt transfered to stepdown.       Discharge plan: Planning ECF at d/c.  SS following.
Spouse/Significant Other   Support Systems:  Spouse/Significant Other  Current Services PTA:     Potential Assistance Needed:     Potential Assistance Purchasing Medications:     Does patient want to participate in local refill/ meds to beds program?     Type of Home Care Services:     Patient expects to be discharged to:     Expected Discharge date: Follow Up Appointment: Best Day/ Time:      Discharge Plan: Spoke with pt and spouse today. Pt from home with spouse. She was having home PT/OT and SN up until surgical date. Spouse thinks it was [facility]. Spouse drives. Pt has PCP. Pt was also recently at [facility] for some rehab. SW consulted.      Evaluation: yes
services. Will update with discharge plan.     Electronically signed by MIAH Serrano on 3/22/2018 at 2:59 PM

## 2018-03-26 NOTE — PROGRESS NOTES
fluid level and possible debris within the esophagus. **This report has been created using voice recognition software. It may contain minor errors which are inherent in voice recognition technology. **      Final report electronically signed by Dr. Chitra Toribio on 3/23/2018 12:50 PM      XR CHEST 1 VW   Final Result   Left lower lobe atelectasis and/or infiltrate. Possible small left effusion. Cardiomegaly. **This report has been created using voice recognition software. It may contain minor errors which are inherent in voice recognition technology. **      Final report electronically signed by Dr. Jason Ybarra on 3/23/2018 11:35 AM      CT HEAD WO CONTRAST (CODE STROKE)   Final Result   1. No acute intracranial hemorrhage or mass effect. 2. Age-indeterminate focal area of low-attenuation within the left caudate head may represent an age-indeterminate focal infarct. Correlate clinically. 3. There is at least moderate patchy periventricular and subcortical white matter hypoattenuation demonstrated which may be related to moderate chronic small vessel ischemic change. If there is continued clinical concern, further characterization can be    obtained with MRI. **This report has been created using voice recognition software. It may contain minor errors which are inherent in voice recognition technology. **      Final report electronically signed by Dr. Mauro Roy on 3/23/2018 11:18 AM          Diet: DIET CARB CONTROL; Carb Control: 5 carb choices (75 gms)/meal    DVT prophylaxis: [x] Lovenox                                 [] SCDs                                 [] SQ Heparin                                 [] Encourage ambulation           [] Already on Anticoagulation     Disposition:    [] Home       [] TCU       [] Rehab       [] Psych       [] SNF       [] [place]       [] Other-    Code Status: Full Code    PT/OT Eval Status: y    Assessment/Plan:    Anticipated Discharge in : 2-4 days    Active Hospital Problems    Diagnosis Date Noted    Encephalopathy acute with prob CVA vs Toxic/metabolic encephalopathy [G48.05] 03/23/2018    S/P left nephrectomy  [G89.18] 03/23/2018    Left kidney mass [N28.89] 03/21/2018    Atrophic kidney [N26.1]     Early onset Alzheimer's dementia without behavioral disturbance [G30.0, F02.80] 09/30/2017    CKD stage 2 due to type 2 diabetes mellitus ([facility] [address] [E11.22, N18.2]     Morbid obesity ([facility] [address] [E66.01]     DM (diabetes mellitus) ([facility] [address] [E11.9]     HTN (hypertension) [I10]     CAD (coronary artery disease) [I25.10]     Arthritis [M19.90]     Hyperlipidemia [E78.5] 01/01/1994   Acute stroke    1. Acute stroke: stroke pathway  2. Metabolic encephalopathy: Multifactorial  3. S/P Left Nephrectomy: Mgt per primary  4. DM: glucose control  5. Caution with pain control  6.  Monitor and treat all chronic conditions        Electronically signed by Vale Andrew MD on 3/26/2018 at 12:15 PM

## 2018-03-29 ENCOUNTER — TELEPHONE (OUTPATIENT)
Dept: UROLOGY | Age: 78
End: 2018-03-29

## 2018-04-04 ENCOUNTER — OFFICE VISIT (OUTPATIENT)
Dept: UROLOGY | Age: 78
End: 2018-04-04

## 2018-04-04 ENCOUNTER — TELEPHONE (OUTPATIENT)
Dept: UROLOGY | Age: 78
End: 2018-04-04

## 2018-04-04 VITALS
WEIGHT: 238 LBS | DIASTOLIC BLOOD PRESSURE: 60 MMHG | SYSTOLIC BLOOD PRESSURE: 110 MMHG | BODY MASS INDEX: 44.93 KG/M2 | HEIGHT: 61 IN

## 2018-04-04 DIAGNOSIS — C64.2 RENAL CELL CARCINOMA OF LEFT KIDNEY (HCC): Primary | ICD-10-CM

## 2018-04-04 PROCEDURE — 99024 POSTOP FOLLOW-UP VISIT: CPT | Performed by: NURSE PRACTITIONER

## 2018-04-04 RX ORDER — DOCUSATE SODIUM 100 MG/1
100 CAPSULE, LIQUID FILLED ORAL EVERY MORNING
COMMUNITY

## 2018-04-04 RX ORDER — NITROFURANTOIN 25; 75 MG/1; MG/1
100 CAPSULE ORAL 2 TIMES DAILY
Qty: 20 CAPSULE | Refills: 0 | Status: SHIPPED | OUTPATIENT
Start: 2018-04-04 | End: 2018-04-14

## 2018-06-20 ENCOUNTER — TELEPHONE (OUTPATIENT)
Dept: INTERNAL MEDICINE CLINIC | Age: 78
End: 2018-06-20

## 2018-06-27 ENCOUNTER — TELEPHONE (OUTPATIENT)
Dept: INTERNAL MEDICINE CLINIC | Age: 78
End: 2018-06-27

## 2018-07-17 ENCOUNTER — TELEPHONE (OUTPATIENT)
Dept: ONCOLOGY | Age: 78
End: 2018-07-17

## 2018-07-17 ENCOUNTER — OFFICE VISIT (OUTPATIENT)
Dept: INTERNAL MEDICINE CLINIC | Age: 78
End: 2018-07-17
Payer: MEDICARE

## 2018-07-17 ENCOUNTER — TELEPHONE (OUTPATIENT)
Dept: INTERNAL MEDICINE CLINIC | Age: 78
End: 2018-07-17

## 2018-07-17 VITALS
WEIGHT: 235 LBS | SYSTOLIC BLOOD PRESSURE: 108 MMHG | HEART RATE: 62 BPM | HEIGHT: 61 IN | DIASTOLIC BLOOD PRESSURE: 50 MMHG | BODY MASS INDEX: 44.37 KG/M2

## 2018-07-17 DIAGNOSIS — R80.9 TYPE 2 DIABETES MELLITUS WITH MICROALBUMINURIA, WITH LONG-TERM CURRENT USE OF INSULIN (HCC): ICD-10-CM

## 2018-07-17 DIAGNOSIS — C64.2 RENAL CELL CARCINOMA OF LEFT KIDNEY (HCC): ICD-10-CM

## 2018-07-17 DIAGNOSIS — E11.29 TYPE 2 DIABETES MELLITUS WITH MICROALBUMINURIA, WITH LONG-TERM CURRENT USE OF INSULIN (HCC): ICD-10-CM

## 2018-07-17 DIAGNOSIS — F02.80 ALZHEIMER'S DEMENTIA WITHOUT BEHAVIORAL DISTURBANCE, UNSPECIFIED TIMING OF DEMENTIA ONSET: ICD-10-CM

## 2018-07-17 DIAGNOSIS — Z79.4 TYPE 2 DIABETES MELLITUS WITH MICROALBUMINURIA, WITH LONG-TERM CURRENT USE OF INSULIN (HCC): ICD-10-CM

## 2018-07-17 DIAGNOSIS — I10 ESSENTIAL HYPERTENSION: Primary | ICD-10-CM

## 2018-07-17 DIAGNOSIS — G30.9 ALZHEIMER'S DEMENTIA WITHOUT BEHAVIORAL DISTURBANCE, UNSPECIFIED TIMING OF DEMENTIA ONSET: ICD-10-CM

## 2018-07-17 DIAGNOSIS — E78.00 PURE HYPERCHOLESTEROLEMIA: ICD-10-CM

## 2018-07-17 DIAGNOSIS — F32.A DEPRESSION, UNSPECIFIED DEPRESSION TYPE: ICD-10-CM

## 2018-07-17 DIAGNOSIS — R53.81 PHYSICAL DECONDITIONING: ICD-10-CM

## 2018-07-17 DIAGNOSIS — G89.18: ICD-10-CM

## 2018-07-17 PROCEDURE — 99214 OFFICE O/P EST MOD 30 MIN: CPT | Performed by: INTERNAL MEDICINE

## 2018-07-17 RX ORDER — SERTRALINE HYDROCHLORIDE 100 MG/1
100 TABLET, FILM COATED ORAL DAILY
COMMUNITY
End: 2018-08-01 | Stop reason: SDUPTHER

## 2018-07-17 RX ORDER — ALPRAZOLAM 0.25 MG/1
0.25 TABLET ORAL 3 TIMES DAILY PRN
Status: ON HOLD | COMMUNITY
End: 2020-02-24 | Stop reason: ALTCHOICE

## 2018-07-17 RX ORDER — HYDROCHLOROTHIAZIDE 25 MG/1
25 TABLET ORAL DAILY
COMMUNITY

## 2018-07-17 RX ORDER — NYSTATIN 100000 [USP'U]/G
POWDER TOPICAL
Status: ON HOLD | COMMUNITY
End: 2020-02-24

## 2018-07-17 RX ORDER — METOPROLOL SUCCINATE 100 MG/1
50 TABLET, EXTENDED RELEASE ORAL DAILY
COMMUNITY
End: 2018-07-17 | Stop reason: DRUGHIGH

## 2018-07-17 RX ORDER — METOPROLOL SUCCINATE 50 MG/1
50 TABLET, EXTENDED RELEASE ORAL DAILY
Status: ON HOLD | COMMUNITY
End: 2020-02-28 | Stop reason: HOSPADM

## 2018-07-17 RX ORDER — LISINOPRIL 40 MG/1
20 TABLET ORAL EVERY MORNING
Status: ON HOLD | COMMUNITY
End: 2022-10-26

## 2018-07-17 NOTE — PROGRESS NOTES
neuropathy. No autonomic dysfunction. Eye exam 10/23/17 with Dr. Ovidio Hartmann - mild DM retinopathy. FSBS 3 lows in am - will decrease Levemir from 35 to 32 Units. Continue metformin, and Trulicity as well. Told  to avoid sugary/carb snack at night - make sure has some protein - as he will bring in sweets for her. Hyperlipidemia - check hepatic panel now.  1/2017, added Lipitor and now LDL at goal 12/2017 at 70. No myalgia on Lipitor. Hypomagnesemia - slightly low magnesium level 5/2018 at 1.7 - monitor periodically. Frequent UTIs due to urinary incontinence - hopefully better now at assisted living and changing undergarments more frequently. This was an issue at home. CAD - s/p MI and CABG 2 vessels 1994. She was seen on 6/7/17 by Dr. Miguel Braga and this visit was a one week follow up of ECHO and stress test.  Reportedly testing unremarkable - mild to moderate cardiac risk for planned surgery at that time. Continue beta blocker. Dementia - she is very social but when asked pointed questions about her health - she is unable to answer. She doesn't like to ask for help. She will need nursing to handle all medication. She is able to ambulate short distances but needs assistance. Monitor her closely as will slip out of wheelchair and has been found on floor at assisted living. Will see if could qualify for more PT to help with strength. Past Medical History:   Diagnosis Date    Arthritis     CAD (coronary artery disease)     s/p MI, CABG 2 vessels 1994    CKD stage 2 due to type 2 diabetes mellitus (HCC)     secondary to obesity, aging, and DM    DM (diabetes mellitus) ([address]     DM retinopathy ([address]     mild    HTN (hypertension)     variable, improved by chaning her medication raya.     Hyperlipidemia 1994    Morbid obesity ([address]     OAB (overactive bladder)     chronic episodes of incontinence per     UTI (urinary tract infection) 07/2017    preop       Past Surgical History:   Procedure Laterality Date    CORONARY ARTERY BYPASS GRAFT  1995    s/p two-vessel [place]    HYSTERECTOMY  1994    KNEE ARTHROSCOPY Right ? Dr. Shira Dawn LAP, RADICAL NEPHRECTOMY Left 3/21/2018    ROBOT ASSISTED LAPAROSCOPIC LEFT RADICAL NEPHRECTOMY performed by Suleiman Rashid MD at [place] [address] Right     TUMOR REMOVAL  1994    ovarian       Current Outpatient Prescriptions   Medication Sig Dispense Refill    hydrochlorothiazide (HYDRODIURIL) 25 MG tablet Take 25 mg by mouth daily      lisinopril (PRINIVIL;ZESTRIL) 40 MG tablet Take 40 mg by mouth daily      ALPRAZolam (XANAX) 0.25 MG tablet Take 0.25 mg by mouth 3 times daily as needed for Anxiety. Jin Bolgaldino miconazole (MICOTIN) 2 % cream Apply topically 2 times daily Apply topically 2 times daily.  To coccyx and buttocks      nystatin (MYCOSTATIN) 061036 UNIT/GM powder Apply topically Apply to breast and groin every shift      aspirin 81 MG EC tablet Take 1 tablet by mouth daily 30 tablet 0    Cholecalciferol (VITAMIN D3) 2000 units TABS Take 2,000 Units by mouth daily       Dulaglutide (TRULICITY) 4.26 WY/2.7OW SOPN Inject 0.75 mg into the skin once a week Takes on Mon      hydrALAZINE (APRESOLINE) 50 MG tablet Take 1 tablet by mouth 2 times daily 180 tablet 1    Magnesium 400 MG CAPS Take 1 tablet by mouth 2 times daily 180 capsule 1    potassium chloride (KLOR-CON M) 20 MEQ extended release tablet Take 1 tablet by mouth daily 90 tablet 1    methenamine (HIPREX) 1 g tablet Take 1 tablet by mouth 2 times daily (with meals) 60 tablet 5    acetaminophen (TYLENOL) 500 MG tablet Take 500 mg by mouth every 6 hours as needed for Pain      solifenacin (VESICARE) 10 MG tablet Take 1 tablet by mouth daily 90 tablet 1    atorvastatin (LIPITOR) 40 MG tablet Take 1 tablet by mouth daily 90 tablet 1    metFORMIN (GLUCOPHAGE) 500 MG tablet Take 1 tablet by mouth 2 times daily (with meals) 180 tablet 1    amLODIPine (NORVASC) 10 MG tablet Take 1 tablet by mouth daily 90 tablet 1    insulin glargine (LANTUS SOLOSTAR) 100 UNIT/ML injection pen Inject 32 Units into the skin nightly 4 pen 0    insulin detemir (LEVEMIR) 100 UNIT/ML injection vial Inject 40 Units into the skin nightly 1 vial 5    sertraline (ZOLOFT) 100 MG tablet Take 1 tablet by mouth daily 30 tablet 5    blood glucose monitor strips Test three times daily. One Touch ultra test strips 100 strip 3    metoprolol succinate (TOPROL XL) 50 MG extended release tablet Take 50 mg by mouth daily      docusate sodium (COLACE) 100 MG capsule Take 100 mg by mouth 2 times daily       No current facility-administered medications for this visit. No Known Allergies    Social History     Social History    Marital status:      Spouse name: N/A    Number of children: N/A    Years of education: N/A     Occupational History    Not on file. Social History Main Topics    Smoking status: Never Smoker    Smokeless tobacco: Never Used    Alcohol use No    Drug use: No    Sexual activity: Not on file     Other Topics Concern    Not on file     Social History Narrative    No narrative on file       Family History   Problem Relation Age of Onset    Arthritis Mother     Coronary Art Dis Mother     Diabetes Mother     High Blood Pressure Mother     Other Mother         TIA's    Cancer Father     Stroke Father     Cancer Paternal Grandmother        Review of Systems - General ROS: negative for - chills or fever  Psychological ROS: negative for - anxiety and depression, positive dementia  Hematological and Lymphatic ROS: No history of blood clots or bleeding disorder.    Respiratory ROS: no cough, shortness of breath, or wheezing  Cardiovascular ROS: no chest pain or dyspnea on exertion, but extremely limited activity due to knee pain   Gastrointestinal ROS: no abdominal pain, change in bowel habits, or black or bloody stools  Genito-Urinary home health to continue building her strength. Depression - stable, continue Zoloft. Renal cell carcinoma - s/p left nephrectomy - due for CT 10/1/18 for follow up per Urology - Dr. Carbajal  office. Follow up appointment in 3 months to follow up hypertension. Allergies: Patient has no known allergies. Calli Suarez MD    [facility] PROFESSIONAL [facility]  PHYSICIANS [facility]. [place] [address]  [address]  [address]  Dept: [phone]  Dept Fax: [phone] : [phone]

## 2018-07-18 ENCOUNTER — TELEPHONE (OUTPATIENT)
Dept: INTERNAL MEDICINE CLINIC | Age: 78
End: 2018-07-18

## 2018-07-19 LAB
AVERAGE GLUCOSE: 131
HBA1C MFR BLD: 6.2 %

## 2018-07-19 NOTE — TELEPHONE ENCOUNTER
Spoke to mele at [facility]. She says they are starting therapy back up, they have gotten more days approved. She states she talked to 2 aids and a nurse at assisted living and they denied falls. She will look into this.

## 2018-07-20 ENCOUNTER — TELEPHONE (OUTPATIENT)
Dept: INTERNAL MEDICINE CLINIC | Age: 78
End: 2018-07-20

## 2018-07-20 NOTE — TELEPHONE ENCOUNTER
----- Message from Sinai Zelaya MD sent at 7/20/2018 10:16 AM EDT -----  Let  know that DM is controlled, but protein is low. We will need to work with assisted living about giving her a Protein Power or Glucerna (with at least 15 gm protein) drink 1-2x a day.

## 2018-07-20 NOTE — TELEPHONE ENCOUNTER
here and informed that Dr. Sarah Cabello states that her DM is controlled, however her protein intake is low. Encouraged him to work with assisted living on giving her Protein Power or Glucerna drink with at least 15 gm of protein 1-2 times per day. Samples of Glucerna given to him for her to try and he verbalized understanding.

## 2018-07-23 DIAGNOSIS — E11.9 DIABETES MELLITUS WITHOUT COMPLICATION (HCC): Primary | ICD-10-CM

## 2018-07-23 RX ORDER — GLUCOSAMINE HCL/CHONDROITIN SU 500-400 MG
CAPSULE ORAL
Qty: 100 STRIP | Refills: 3 | Status: SHIPPED | OUTPATIENT
Start: 2018-07-23

## 2018-08-03 RX ORDER — SERTRALINE HYDROCHLORIDE 100 MG/1
100 TABLET, FILM COATED ORAL DAILY
Qty: 30 TABLET | Refills: 5 | Status: ON HOLD | OUTPATIENT
Start: 2018-08-03 | End: 2022-10-26

## 2018-08-16 ENCOUNTER — TELEPHONE (OUTPATIENT)
Dept: INTERNAL MEDICINE CLINIC | Age: 78
End: 2018-08-16

## 2018-08-27 ENCOUNTER — TELEPHONE (OUTPATIENT)
Dept: INTERNAL MEDICINE CLINIC | Age: 78
End: 2018-08-27

## 2018-09-05 ENCOUNTER — TELEPHONE (OUTPATIENT)
Dept: INTERNAL MEDICINE CLINIC | Age: 78
End: 2018-09-05

## 2018-09-07 PROBLEM — G30.9 ALZHEIMER'S DEMENTIA WITHOUT BEHAVIORAL DISTURBANCE (HCC): Status: ACTIVE | Noted: 2018-09-07

## 2018-09-07 PROBLEM — F02.80 EARLY ONSET ALZHEIMER'S DEMENTIA WITHOUT BEHAVIORAL DISTURBANCE (HCC): Status: RESOLVED | Noted: 2017-09-30 | Resolved: 2018-09-07

## 2018-09-07 PROBLEM — F02.80 ALZHEIMER'S DEMENTIA WITHOUT BEHAVIORAL DISTURBANCE (HCC): Status: ACTIVE | Noted: 2018-09-07

## 2018-09-07 PROBLEM — G30.0 EARLY ONSET ALZHEIMER'S DEMENTIA WITHOUT BEHAVIORAL DISTURBANCE (HCC): Status: RESOLVED | Noted: 2017-09-30 | Resolved: 2018-09-07

## 2018-10-01 ENCOUNTER — HOSPITAL ENCOUNTER (OUTPATIENT)
Dept: CT IMAGING | Age: 78
Discharge: HOME | End: 2018-10-01
Payer: MEDICARE

## 2018-10-01 DIAGNOSIS — C64.2 RENAL CELL CARCINOMA OF LEFT KIDNEY (HCC): ICD-10-CM

## 2018-10-01 LAB — POC CREATININE WHOLE BLOOD: 0.9 MG/DL (ref 0.5–1.2)

## 2018-10-01 PROCEDURE — 6360000004 HC RX CONTRAST MEDICATION: Performed by: NURSE PRACTITIONER

## 2018-10-01 PROCEDURE — 82565 ASSAY OF CREATININE: CPT

## 2018-10-01 PROCEDURE — 74170 CT ABD WO CNTRST FLWD CNTRST: CPT

## 2018-10-01 RX ADMIN — IOPAMIDOL 85 ML: 755 INJECTION, SOLUTION INTRAVENOUS at 12:19

## 2018-10-08 ENCOUNTER — OFFICE VISIT (OUTPATIENT)
Dept: UROLOGY | Age: 78
End: 2018-10-08
Payer: MEDICARE

## 2018-10-08 VITALS
WEIGHT: 232 LBS | DIASTOLIC BLOOD PRESSURE: 72 MMHG | HEIGHT: 61 IN | BODY MASS INDEX: 43.8 KG/M2 | SYSTOLIC BLOOD PRESSURE: 120 MMHG

## 2018-10-08 DIAGNOSIS — C64.2 RENAL CELL CARCINOMA OF LEFT KIDNEY (HCC): Primary | ICD-10-CM

## 2018-10-08 PROCEDURE — 99213 OFFICE O/P EST LOW 20 MIN: CPT | Performed by: NURSE PRACTITIONER

## 2018-10-08 NOTE — PROGRESS NOTES
family history includes Arthritis in her mother; Cancer in her father and paternal grandmother; Coronary Art Dis in her mother; Diabetes in her mother; High Blood Pressure in her mother; Other in her mother; Stroke in her father. Social History  Dawson Shape  reports that she has never smoked. She has never used smokeless tobacco. She reports that she does not drink alcohol or use drugs. Subjective:     Review of Systems  No problems with ears, nose or throat. No problems with eyes. No chest pain, shortness of breath, abdominal pain, extremity pain or weakness, and no neurological deficits. No rashes.  symptoms per HPI. The remainder of the review of symptoms is negative. Objective:     PE:   Vitals:    10/08/18 1328   BP: 120/72   Weight: 232 lb (105.2 kg)   Height: 5' 1\" (1.549 m)       Constitutional: Alert and oriented times 3, no acute distress and cooperative to examination with appropriate mood and affect. she is in wheel chair  HENT:   Head:        Normocephalic and atraumatic. Mouth/Throat:         Mucous membranes are normal.   Eyes:         EOM are normal. No scleral icterus. PERRLA. Neck:        Supple, symmetrical, trachea midline  Pulmonary/Chest:      Chest symmetric with normal A/P diameter, Normal respiratory rate and rhthym. No use of accessory muscles. Abdominal:         Soft. No tenderness. Bowel sounds present. Musculoskeletal:         Abnormal range of motion. No edema or tenderness of lower extremities. Extremities: No cyanosis, clubbing, or edema present. Neurological:        Alert and oriented. No cranial nerve deficit. [facility]:        Normal mood and affect. Labs   Urine dip demonstrates   No results found for this visit on 10/08/18.      Recent BUN/Creatinine:  Lab Results   Component Value Date    BUN 18 05/12/2018    CREATININE 0.85 05/12/2018       Radiology    CT Abdomen w wo contrast: 10/01/18  FINDINGS:        Lung bases: Mild fibrotic scarring

## 2019-03-21 LAB
ALBUMIN SERPL-MCNC: 3.3 G/DL
ALP BLD-CCNC: 53 U/L
ALT SERPL-CCNC: 9 U/L
ANION GAP SERPL CALCULATED.3IONS-SCNC: NORMAL MMOL/L
AST SERPL-CCNC: 10 U/L
AVERAGE GLUCOSE: 137
BASOPHILS ABSOLUTE: 0.1 /ΜL
BASOPHILS RELATIVE PERCENT: 0.4 %
BILIRUB SERPL-MCNC: 0.5 MG/DL (ref 0.1–1.4)
BUN BLDV-MCNC: 20 MG/DL
CALCIUM SERPL-MCNC: 9 MG/DL
CHLORIDE BLD-SCNC: 106 MMOL/L
CHOLESTEROL, TOTAL: 115 MG/DL
CHOLESTEROL/HDL RATIO: ABNORMAL
CO2: 30 MMOL/L
CREAT SERPL-MCNC: 0.8 MG/DL
EOSINOPHILS ABSOLUTE: 0.2 /ΜL
EOSINOPHILS RELATIVE PERCENT: 3.6 %
GFR CALCULATED: NORMAL
GLUCOSE BLD-MCNC: 64 MG/DL
HBA1C MFR BLD: 6.4 %
HCT VFR BLD CALC: 38.4 % (ref 36–46)
HDLC SERPL-MCNC: 29 MG/DL (ref 35–70)
HEMOGLOBIN: 12.4 G/DL (ref 12–16)
LDL CHOLESTEROL CALCULATED: 62 MG/DL (ref 0–160)
LYMPHOCYTES ABSOLUTE: 1.7 /ΜL
LYMPHOCYTES RELATIVE PERCENT: 27.9 %
MCH RBC QN AUTO: 28.1 PG
MCHC RBC AUTO-ENTMCNC: 32.2 G/DL
MCV RBC AUTO: 87.2 FL
MONOCYTES ABSOLUTE: 0.6 /ΜL
MONOCYTES RELATIVE PERCENT: 9.5 %
NEUTROPHILS ABSOLUTE: 3.6 /ΜL
NEUTROPHILS RELATIVE PERCENT: 58.6 %
PDW BLD-RTO: 14.5 %
PLATELET # BLD: 163 K/ΜL
PMV BLD AUTO: 10.2 FL
POTASSIUM SERPL-SCNC: 4.4 MMOL/L
RBC # BLD: 4.4 10^6/ΜL
SODIUM BLD-SCNC: 140 MMOL/L
TOTAL PROTEIN: 5.2
TRIGL SERPL-MCNC: 121 MG/DL
VLDLC SERPL CALC-MCNC: 24 MG/DL
WBC # BLD: 6.2 10^3/ML

## 2019-03-25 LAB
ALBUMIN SERPL-MCNC: 3.2 G/DL
ALP BLD-CCNC: 40 U/L
ALT SERPL-CCNC: 8 U/L
ANION GAP SERPL CALCULATED.3IONS-SCNC: NORMAL MMOL/L
AST SERPL-CCNC: 9 U/L
BILIRUB SERPL-MCNC: 0.4 MG/DL (ref 0.1–1.4)
BUN BLDV-MCNC: 23 MG/DL
CALCIUM SERPL-MCNC: 8.8 MG/DL
CHLORIDE BLD-SCNC: 108 MMOL/L
CO2: 27 MMOL/L
CREAT SERPL-MCNC: 0.8 MG/DL
GFR CALCULATED: NORMAL
GLUCOSE BLD-MCNC: 85 MG/DL
POTASSIUM SERPL-SCNC: 4.6 MMOL/L
SODIUM BLD-SCNC: 141 MMOL/L
TOTAL PROTEIN: 5.3

## 2019-04-01 ENCOUNTER — HOSPITAL ENCOUNTER (OUTPATIENT)
Dept: CT IMAGING | Age: 79
Discharge: HOME | End: 2019-04-01
Payer: MEDICARE

## 2019-04-01 DIAGNOSIS — C64.2 RENAL CELL CARCINOMA OF LEFT KIDNEY (HCC): ICD-10-CM

## 2019-04-01 PROCEDURE — 74178 CT ABD&PLV WO CNTR FLWD CNTR: CPT

## 2019-04-01 PROCEDURE — 6360000004 HC RX CONTRAST MEDICATION: Performed by: NURSE PRACTITIONER

## 2019-04-01 RX ADMIN — IOPAMIDOL 85 ML: 755 INJECTION, SOLUTION INTRAVENOUS at 08:30

## 2019-04-04 ENCOUNTER — TELEPHONE (OUTPATIENT)
Dept: UROLOGY | Age: 79
End: 2019-04-04

## 2019-04-09 ENCOUNTER — OFFICE VISIT (OUTPATIENT)
Dept: UROLOGY | Age: 79
End: 2019-04-09
Payer: MEDICARE

## 2019-04-09 VITALS
SYSTOLIC BLOOD PRESSURE: 126 MMHG | HEIGHT: 63 IN | BODY MASS INDEX: 41.64 KG/M2 | WEIGHT: 235 LBS | DIASTOLIC BLOOD PRESSURE: 62 MMHG

## 2019-04-09 DIAGNOSIS — C64.2 RENAL CELL CARCINOMA OF LEFT KIDNEY (HCC): Primary | ICD-10-CM

## 2019-04-09 PROCEDURE — 99213 OFFICE O/P EST LOW 20 MIN: CPT | Performed by: NURSE PRACTITIONER

## 2019-04-09 RX ORDER — ASCORBIC ACID 500 MG
500 TABLET ORAL NIGHTLY
COMMUNITY

## 2019-04-09 NOTE — PROGRESS NOTES
[address] Eduar Doe  Dept: [phone]  Loc: [phone]  Visit Date: 8/0/7243      HPI:     Prince Danielle f/u today for Renal Cell Carcinoma. She is s/p RAL Radical Nephrectomy with Dr. Belkis Aguilar on 03/26/2018. Surgical pathology resulted in renal cell carcinoma, grade 1, pT1a, margins are negative, perihilar lymph negative for malignancy, pN0. She continues to do well. She had a CVA after the procedure, she remains at [facility], performing rehab. Denies any urinary issues at this time. Denies any UTI  She comes in today with her , hx is obtained from the patient and the medical record. Current Outpatient Medications   Medication Sig Dispense Refill    vitamin C (ASCORBIC ACID) 500 MG tablet Take 500 mg by mouth daily      Insulin Aspart (NOVOLOG SC) Inject into the skin      magnesium hydroxide (MILK OF MAGNESIA CONCENTRATE) 2400 MG/10ML SUSP Take 2,400 mg by mouth once as needed      Dulaglutide (TRULICITY) 1.27 JM/0.8ES SOPN Inject 0.75 mg into the skin once a week Takes on Mon 2 pen 3    insulin glargine (LANTUS SOLOSTAR) 100 UNIT/ML injection pen Inject 32 Units into the skin nightly 4 pen 0    insulin detemir (LEVEMIR) 100 UNIT/ML injection vial Inject 40 Units into the skin nightly 1 vial 5    sertraline (ZOLOFT) 100 MG tablet Take 1 tablet by mouth daily 30 tablet 5    blood glucose monitor strips Test three times daily. One Touch ultra test strips 100 strip 3    hydrochlorothiazide (HYDRODIURIL) 25 MG tablet Take 25 mg by mouth daily      lisinopril (PRINIVIL;ZESTRIL) 40 MG tablet Take 40 mg by mouth daily      ALPRAZolam (XANAX) 0.25 MG tablet Take 0.25 mg by mouth 3 times daily as needed for Anxiety. Arcadio Negrete miconazole (MICOTIN) 2 % cream Apply topically 2 times daily Apply topically 2 times daily.  To coccyx and buttocks      nystatin (MYCOSTATIN) 009466 UNIT/GM powder Apply topically Apply to breast and groin every shift      metoprolol succinate (TOPROL XL) 50 MG extended release tablet Take 50 mg by mouth daily      docusate sodium (COLACE) 100 MG capsule Take 100 mg by mouth 2 times daily      aspirin 81 MG EC tablet Take 1 tablet by mouth daily 30 tablet 0    Cholecalciferol (VITAMIN D3) 2000 units TABS Take 2,000 Units by mouth daily       hydrALAZINE (APRESOLINE) 50 MG tablet Take 1 tablet by mouth 2 times daily 180 tablet 1    Magnesium 400 MG CAPS Take 1 tablet by mouth 2 times daily 180 capsule 1    potassium chloride (KLOR-CON M) 20 MEQ extended release tablet Take 1 tablet by mouth daily 90 tablet 1    methenamine (HIPREX) 1 g tablet Take 1 tablet by mouth 2 times daily (with meals) 60 tablet 5    acetaminophen (TYLENOL) 500 MG tablet Take 500 mg by mouth every 6 hours as needed for Pain      solifenacin (VESICARE) 10 MG tablet Take 1 tablet by mouth daily 90 tablet 1    atorvastatin (LIPITOR) 40 MG tablet Take 1 tablet by mouth daily 90 tablet 1    amLODIPine (NORVASC) 10 MG tablet Take 1 tablet by mouth daily 90 tablet 1    metFORMIN (GLUCOPHAGE) 500 MG tablet Take 1 tablet by mouth 2 times daily (with meals) 180 tablet 1     No current facility-administered medications for this visit. Past Medical History  Bhargavi Luther  has a past medical history of Arthritis, CAD (coronary artery disease), CKD stage 2 due to type 2 diabetes mellitus ([facility] [address] DM (diabetes mellitus) ([facility] [address] DM retinopathy ([facility] [address] HTN (hypertension), Hyperlipidemia, Morbid obesity ([facility] [address] OAB (overactive bladder), and UTI (urinary tract infection). Past Surgical History  The patient  has a past surgical history that includes tumor removal (1994); Hysterectomy (1994); Coronary artery bypass graft (1995); Knee arthroscopy (Right, ?); Total knee arthroplasty (Right); and pr lap, radical nephrectomy (Left, 3/21/2018).     Family History  This patient's family history includes Arthritis in her aorta and calcific plaquing of the superior mesenteric artery.       Postcontrast   Liver, and spleen are normal.   Pancreas is normal.   Right adrenal gland is normal. Kidney enhances normally and is normal in appearance left kidney is absent. Pelvis   No bowel obstruction. Mild constipation. Uterus is surgically absent. Bladder is normal.   No abnormal fluid collections.       No suspicious bone lesions. Degenerative changes throughout the lumbar spine.           Impression   Prior left nephrectomy. No evidence of recurrent or distant metastatic disease       Assessment & Plan:     Renal Cell Carcinoma  S/p Left Nephrectomy     Cloteal Har f/u today for Renal Cell Carcinoma. She is s/p Robot-Assisted Laparoscopic Radical Nephrectomy with Dr. Melissa Esquivel on 03/26/2018. Surgical pathology resulted in renal cell carcinoma, grade 1, pT1a, margins are negative, perihilar lymph negative for malignancy, pN0. Surveillance imaging shows no evidence of reoccurrence. Plan on following with CT Abdomen/Pelvis, Chest x-ray and CMP in 6 months    Return in about 6 months (around 10/9/2019) for Renal Cell Carcinoma .     NEELIMA Ford  Urology

## 2019-09-19 LAB
ALBUMIN SERPL-MCNC: 3.1 G/DL
ALP BLD-CCNC: 47 U/L
ALT SERPL-CCNC: 7 U/L
ANION GAP SERPL CALCULATED.3IONS-SCNC: NORMAL MMOL/L
AST SERPL-CCNC: 9 U/L
AVERAGE GLUCOSE: 126
BASOPHILS ABSOLUTE: ABNORMAL /ΜL
BASOPHILS RELATIVE PERCENT: 0.6 %
BILIRUB SERPL-MCNC: 0.4 MG/DL (ref 0.1–1.4)
BUN BLDV-MCNC: 19 MG/DL
CALCIUM SERPL-MCNC: 9 MG/DL
CHLORIDE BLD-SCNC: 105 MMOL/L
CHOLESTEROL, TOTAL: 114 MG/DL
CHOLESTEROL/HDL RATIO: ABNORMAL
CO2: 31 MMOL/L
CREAT SERPL-MCNC: 0.8 MG/DL
EOSINOPHILS ABSOLUTE: 0.3 /ΜL
EOSINOPHILS RELATIVE PERCENT: 5.2 %
GFR CALCULATED: NORMAL
GLUCOSE BLD-MCNC: 79 MG/DL
HBA1C MFR BLD: 6 %
HCT VFR BLD CALC: 37.1 % (ref 36–46)
HDLC SERPL-MCNC: 31 MG/DL (ref 35–70)
HEMOGLOBIN: 11.9 G/DL (ref 12–16)
LDL CHOLESTEROL CALCULATED: 60 MG/DL (ref 0–160)
LYMPHOCYTES ABSOLUTE: 1.3 /ΜL
LYMPHOCYTES RELATIVE PERCENT: 23.3 %
MCH RBC QN AUTO: 28.6 PG
MCHC RBC AUTO-ENTMCNC: 32.1 G/DL
MCV RBC AUTO: 89.1 FL
MONOCYTES ABSOLUTE: 0.6 /ΜL
MONOCYTES RELATIVE PERCENT: 10.9 %
NEUTROPHILS ABSOLUTE: 3.4 /ΜL
NEUTROPHILS RELATIVE PERCENT: 60 %
PDW BLD-RTO: 14.5 %
PLATELET # BLD: 142 K/ΜL
PMV BLD AUTO: 10.4 FL
POTASSIUM SERPL-SCNC: 4.3 MMOL/L
RBC # BLD: 4.16 10^6/ΜL
SODIUM BLD-SCNC: 139 MMOL/L
TOTAL PROTEIN: 5.3
TRIGL SERPL-MCNC: 114 MG/DL
VLDLC SERPL CALC-MCNC: 23 MG/DL
WBC # BLD: 5.6 10^3/ML

## 2020-02-23 ENCOUNTER — APPOINTMENT (OUTPATIENT)
Dept: GENERAL RADIOLOGY | Age: 80
DRG: 871 | End: 2020-02-23
Payer: MEDICARE

## 2020-02-23 ENCOUNTER — APPOINTMENT (OUTPATIENT)
Dept: CT IMAGING | Age: 80
DRG: 871 | End: 2020-02-23
Payer: MEDICARE

## 2020-02-23 ENCOUNTER — HOSPITAL ENCOUNTER (INPATIENT)
Age: 80
LOS: 5 days | Discharge: NURSING, REHAB OR LONG-TERM CARE | DRG: 871 | End: 2020-02-28
Attending: EMERGENCY MEDICINE | Admitting: INTERNAL MEDICINE
Payer: MEDICARE

## 2020-02-23 PROBLEM — A41.9 SEPTIC SHOCK (HCC): Status: ACTIVE | Noted: 2020-02-23

## 2020-02-23 PROBLEM — R65.21 SEPTIC SHOCK (HCC): Status: ACTIVE | Noted: 2020-02-23

## 2020-02-23 LAB
ALBUMIN SERPL-MCNC: 3.1 G/DL (ref 3.5–5.1)
ALLEN TEST: POSITIVE
ALP BLD-CCNC: 65 U/L (ref 38–126)
ALT SERPL-CCNC: 44 U/L (ref 11–66)
AMMONIA: 59 UMOL/L (ref 11–60)
ANION GAP SERPL CALCULATED.3IONS-SCNC: 17 MEQ/L (ref 8–16)
AST SERPL-CCNC: 37 U/L (ref 5–40)
BACTERIA: ABNORMAL /HPF
BASE EXCESS (CALCULATED): -6.7 MMOL/L (ref -2.5–2.5)
BASOPHILS # BLD: 0.4 %
BASOPHILS ABSOLUTE: 0.1 THOU/MM3 (ref 0–0.1)
BILIRUB SERPL-MCNC: 0.4 MG/DL (ref 0.3–1.2)
BILIRUBIN DIRECT: < 0.2 MG/DL (ref 0–0.3)
BILIRUBIN URINE: ABNORMAL
BLOOD, URINE: ABNORMAL
BUN BLDV-MCNC: 24 MG/DL (ref 7–22)
CALCIUM SERPL-MCNC: 8.7 MG/DL (ref 8.5–10.5)
CASTS 2: ABNORMAL /LPF
CASTS UA: ABNORMAL /LPF
CHARACTER, URINE: ABNORMAL
CHLORIDE BLD-SCNC: 106 MEQ/L (ref 98–111)
CO2: 17 MEQ/L (ref 23–33)
COLLECTED BY:: ABNORMAL
COLOR: ABNORMAL
CREAT SERPL-MCNC: 1.4 MG/DL (ref 0.4–1.2)
CRYSTALS, UA: ABNORMAL
DEVICE: ABNORMAL
EKG ATRIAL RATE: 61 BPM
EKG P AXIS: 83 DEGREES
EKG P-R INTERVAL: 286 MS
EKG Q-T INTERVAL: 472 MS
EKG QRS DURATION: 156 MS
EKG QTC CALCULATION (BAZETT): 516 MS
EKG R AXIS: 107 DEGREES
EKG T AXIS: 49 DEGREES
EKG VENTRICULAR RATE: 72 BPM
EOSINOPHIL # BLD: 0.2 %
EOSINOPHILS ABSOLUTE: 0 THOU/MM3 (ref 0–0.4)
EPITHELIAL CELLS, UA: ABNORMAL /HPF
ERYTHROCYTE [DISTWIDTH] IN BLOOD BY AUTOMATED COUNT: 14.3 % (ref 11.5–14.5)
ERYTHROCYTE [DISTWIDTH] IN BLOOD BY AUTOMATED COUNT: 50 FL (ref 35–45)
GFR SERPL CREATININE-BSD FRML MDRD: 36 ML/MIN/1.73M2
GLUCOSE BLD-MCNC: 312 MG/DL (ref 70–108)
GLUCOSE URINE: 250 MG/DL
HCO3: 19 MMOL/L (ref 23–28)
HCT VFR BLD CALC: 45 % (ref 37–47)
HEMOGLOBIN: 13.2 GM/DL (ref 12–16)
ICTOTEST: NEGATIVE
IFIO2: 6
IMMATURE GRANS (ABS): 0.1 THOU/MM3 (ref 0–0.07)
IMMATURE GRANULOCYTES: 0.7 %
KETONES, URINE: ABNORMAL
LACTIC ACID, SEPSIS: 6.1 MMOL/L (ref 0.5–1.9)
LACTIC ACID, SEPSIS: 6.4 MMOL/L (ref 0.5–1.9)
LEUKOCYTE ESTERASE, URINE: ABNORMAL
LYMPHOCYTES # BLD: 11.4 %
LYMPHOCYTES ABSOLUTE: 1.5 THOU/MM3 (ref 1–4.8)
MCH RBC QN AUTO: 28 PG (ref 26–33)
MCHC RBC AUTO-ENTMCNC: 29.3 GM/DL (ref 32.2–35.5)
MCV RBC AUTO: 95.3 FL (ref 81–99)
MISCELLANEOUS 2: ABNORMAL
MONOCYTES # BLD: 7.9 %
MONOCYTES ABSOLUTE: 1.1 THOU/MM3 (ref 0.4–1.3)
NITRITE, URINE: NEGATIVE
NUCLEATED RED BLOOD CELLS: 0 /100 WBC
O2 SATURATION: 94 %
OSMOLALITY CALCULATION: 295.3 MOSMOL/KG (ref 275–300)
PCO2: 37 MMHG (ref 35–45)
PH BLOOD GAS: 7.31 (ref 7.35–7.45)
PH UA: 5.5 (ref 5–9)
PLATELET # BLD: 197 THOU/MM3 (ref 130–400)
PMV BLD AUTO: 11.9 FL (ref 9.4–12.4)
PO2: 77 MMHG (ref 71–104)
POTASSIUM REFLEX MAGNESIUM: 6.3 MEQ/L (ref 3.5–5.2)
PRO-BNP: 1835 PG/ML (ref 0–1800)
PROTEIN UA: >= 300
RBC # BLD: 4.72 MILL/MM3 (ref 4.2–5.4)
RBC URINE: > 100 /HPF
REASON FOR REJECTION: NORMAL
REJECTED TEST: NORMAL
RENAL EPITHELIAL, UA: ABNORMAL
SEG NEUTROPHILS: 79.4 %
SEGMENTED NEUTROPHILS ABSOLUTE COUNT: 10.6 THOU/MM3 (ref 1.8–7.7)
SODIUM BLD-SCNC: 140 MEQ/L (ref 135–145)
SOURCE, BLOOD GAS: ABNORMAL
SPECIFIC GRAVITY, URINE: > 1.03 (ref 1–1.03)
TOTAL PROTEIN: 5.8 G/DL (ref 6.1–8)
TROPONIN T: 0.31 NG/ML
UROBILINOGEN, URINE: 1 EU/DL (ref 0–1)
WBC # BLD: 13.4 THOU/MM3 (ref 4.8–10.8)
WBC UA: ABNORMAL /HPF
YEAST: ABNORMAL

## 2020-02-23 PROCEDURE — 36600 WITHDRAWAL OF ARTERIAL BLOOD: CPT

## 2020-02-23 PROCEDURE — 2700000000 HC OXYGEN THERAPY PER DAY

## 2020-02-23 PROCEDURE — 87077 CULTURE AEROBIC IDENTIFY: CPT

## 2020-02-23 PROCEDURE — 2000000000 HC ICU R&B

## 2020-02-23 PROCEDURE — 87040 BLOOD CULTURE FOR BACTERIA: CPT

## 2020-02-23 PROCEDURE — 71045 X-RAY EXAM CHEST 1 VIEW: CPT

## 2020-02-23 PROCEDURE — 02HV33Z INSERTION OF INFUSION DEVICE INTO SUPERIOR VENA CAVA, PERCUTANEOUS APPROACH: ICD-10-PCS | Performed by: RADIOLOGY

## 2020-02-23 PROCEDURE — 83605 ASSAY OF LACTIC ACID: CPT

## 2020-02-23 PROCEDURE — 99291 CRITICAL CARE FIRST HOUR: CPT

## 2020-02-23 PROCEDURE — 96365 THER/PROPH/DIAG IV INF INIT: CPT

## 2020-02-23 PROCEDURE — 96366 THER/PROPH/DIAG IV INF ADDON: CPT

## 2020-02-23 PROCEDURE — 6360000002 HC RX W HCPCS: Performed by: EMERGENCY MEDICINE

## 2020-02-23 PROCEDURE — 2500000003 HC RX 250 WO HCPCS

## 2020-02-23 PROCEDURE — C1751 CATH, INF, PER/CENT/MIDLINE: HCPCS

## 2020-02-23 PROCEDURE — 96375 TX/PRO/DX INJ NEW DRUG ADDON: CPT

## 2020-02-23 PROCEDURE — 85025 COMPLETE CBC W/AUTO DIFF WBC: CPT

## 2020-02-23 PROCEDURE — 80048 BASIC METABOLIC PNL TOTAL CA: CPT

## 2020-02-23 PROCEDURE — 80076 HEPATIC FUNCTION PANEL: CPT

## 2020-02-23 PROCEDURE — 87086 URINE CULTURE/COLONY COUNT: CPT

## 2020-02-23 PROCEDURE — 82140 ASSAY OF AMMONIA: CPT

## 2020-02-23 PROCEDURE — 2709999900 HC NON-CHARGEABLE SUPPLY

## 2020-02-23 PROCEDURE — 2580000003 HC RX 258: Performed by: EMERGENCY MEDICINE

## 2020-02-23 PROCEDURE — 81001 URINALYSIS AUTO W/SCOPE: CPT

## 2020-02-23 PROCEDURE — 94761 N-INVAS EAR/PLS OXIMETRY MLT: CPT

## 2020-02-23 PROCEDURE — 83880 ASSAY OF NATRIURETIC PEPTIDE: CPT

## 2020-02-23 PROCEDURE — 93005 ELECTROCARDIOGRAM TRACING: CPT | Performed by: EMERGENCY MEDICINE

## 2020-02-23 PROCEDURE — 82803 BLOOD GASES ANY COMBINATION: CPT

## 2020-02-23 PROCEDURE — 36415 COLL VENOUS BLD VENIPUNCTURE: CPT

## 2020-02-23 PROCEDURE — 99285 EMERGENCY DEPT VISIT HI MDM: CPT

## 2020-02-23 PROCEDURE — 96367 TX/PROPH/DG ADDL SEQ IV INF: CPT

## 2020-02-23 PROCEDURE — 84484 ASSAY OF TROPONIN QUANT: CPT

## 2020-02-23 PROCEDURE — 87186 SC STD MICRODIL/AGAR DIL: CPT

## 2020-02-23 PROCEDURE — 2500000003 HC RX 250 WO HCPCS: Performed by: EMERGENCY MEDICINE

## 2020-02-23 RX ORDER — CALCIUM GLUCONATE 94 MG/ML
1 INJECTION, SOLUTION INTRAVENOUS ONCE
Status: COMPLETED | OUTPATIENT
Start: 2020-02-23 | End: 2020-02-23

## 2020-02-23 RX ORDER — 0.9 % SODIUM CHLORIDE 0.9 %
30 INTRAVENOUS SOLUTION INTRAVENOUS ONCE
Status: COMPLETED | OUTPATIENT
Start: 2020-02-23 | End: 2020-02-23

## 2020-02-23 RX ORDER — SODIUM CHLORIDE 9 MG/ML
INJECTION, SOLUTION INTRAVENOUS CONTINUOUS
Status: DISCONTINUED | OUTPATIENT
Start: 2020-02-23 | End: 2020-02-24

## 2020-02-23 RX ADMIN — CALCIUM GLUCONATE 1 G: 98 INJECTION, SOLUTION INTRAVENOUS at 21:21

## 2020-02-23 RX ADMIN — VANCOMYCIN HYDROCHLORIDE 2000 MG: 1 INJECTION, POWDER, LYOPHILIZED, FOR SOLUTION INTRAVENOUS at 21:44

## 2020-02-23 RX ADMIN — SODIUM CHLORIDE 3267 ML: 9 INJECTION, SOLUTION INTRAVENOUS at 19:00

## 2020-02-23 RX ADMIN — SODIUM BICARBONATE 50 MEQ: 84 INJECTION, SOLUTION INTRAVENOUS at 21:21

## 2020-02-23 RX ADMIN — SODIUM CHLORIDE: 9 INJECTION, SOLUTION INTRAVENOUS at 20:31

## 2020-02-23 RX ADMIN — Medication 10 MCG/MIN: at 20:18

## 2020-02-23 RX ADMIN — PIPERACILLIN SODIUM,TAZOBACTAM SODIUM 4.5 G: 4; .5 INJECTION, POWDER, FOR SOLUTION INTRAVENOUS at 20:54

## 2020-02-24 ENCOUNTER — APPOINTMENT (OUTPATIENT)
Dept: GENERAL RADIOLOGY | Age: 80
DRG: 871 | End: 2020-02-24
Payer: MEDICARE

## 2020-02-24 ENCOUNTER — APPOINTMENT (OUTPATIENT)
Dept: ULTRASOUND IMAGING | Age: 80
DRG: 871 | End: 2020-02-24
Payer: MEDICARE

## 2020-02-24 ENCOUNTER — APPOINTMENT (OUTPATIENT)
Dept: CT IMAGING | Age: 80
DRG: 871 | End: 2020-02-24
Payer: MEDICARE

## 2020-02-24 LAB
ANION GAP SERPL CALCULATED.3IONS-SCNC: 12 MEQ/L (ref 8–16)
ANION GAP SERPL CALCULATED.3IONS-SCNC: 12 MEQ/L (ref 8–16)
ANION GAP SERPL CALCULATED.3IONS-SCNC: 14 MEQ/L (ref 8–16)
APTT: 137 SECONDS (ref 22–38)
APTT: 31.1 SECONDS (ref 22–38)
APTT: > 200 SECONDS (ref 22–38)
BUN BLDV-MCNC: 25 MG/DL (ref 7–22)
BUN BLDV-MCNC: 26 MG/DL (ref 7–22)
BUN BLDV-MCNC: 26 MG/DL (ref 7–22)
CALCIUM SERPL-MCNC: 8.2 MG/DL (ref 8.5–10.5)
CALCIUM SERPL-MCNC: 8.3 MG/DL (ref 8.5–10.5)
CALCIUM SERPL-MCNC: 8.4 MG/DL (ref 8.5–10.5)
CHLORIDE BLD-SCNC: 106 MEQ/L (ref 98–111)
CHLORIDE BLD-SCNC: 107 MEQ/L (ref 98–111)
CHLORIDE BLD-SCNC: 109 MEQ/L (ref 98–111)
CO2: 21 MEQ/L (ref 23–33)
CO2: 22 MEQ/L (ref 23–33)
CO2: 22 MEQ/L (ref 23–33)
CREAT SERPL-MCNC: 1.5 MG/DL (ref 0.4–1.2)
CREAT SERPL-MCNC: 1.8 MG/DL (ref 0.4–1.2)
CREAT SERPL-MCNC: 1.9 MG/DL (ref 0.4–1.2)
CREATININE URINE: 209.9 MG/DL
EKG ATRIAL RATE: 78 BPM
EKG P AXIS: 82 DEGREES
EKG P-R INTERVAL: 232 MS
EKG Q-T INTERVAL: 424 MS
EKG QRS DURATION: 150 MS
EKG QTC CALCULATION (BAZETT): 483 MS
EKG R AXIS: 84 DEGREES
EKG T AXIS: 29 DEGREES
EKG VENTRICULAR RATE: 78 BPM
ERYTHROCYTE [DISTWIDTH] IN BLOOD BY AUTOMATED COUNT: 14.3 % (ref 11.5–14.5)
ERYTHROCYTE [DISTWIDTH] IN BLOOD BY AUTOMATED COUNT: 50.2 FL (ref 35–45)
GFR SERPL CREATININE-BSD FRML MDRD: 26 ML/MIN/1.73M2
GFR SERPL CREATININE-BSD FRML MDRD: 27 ML/MIN/1.73M2
GFR SERPL CREATININE-BSD FRML MDRD: 34 ML/MIN/1.73M2
GLUCOSE BLD-MCNC: 128 MG/DL (ref 70–108)
GLUCOSE BLD-MCNC: 129 MG/DL (ref 70–108)
GLUCOSE BLD-MCNC: 154 MG/DL (ref 70–108)
GLUCOSE BLD-MCNC: 176 MG/DL (ref 70–108)
GLUCOSE BLD-MCNC: 272 MG/DL (ref 70–108)
GLUCOSE BLD-MCNC: 346 MG/DL (ref 70–108)
HCT VFR BLD CALC: 38.8 % (ref 37–47)
HEMOGLOBIN: 11.6 GM/DL (ref 12–16)
LACTIC ACID: 2 MMOL/L (ref 0.5–2.2)
LV EF: 50 %
LVEF MODALITY: NORMAL
MCH RBC QN AUTO: 28.6 PG (ref 26–33)
MCHC RBC AUTO-ENTMCNC: 29.9 GM/DL (ref 32.2–35.5)
MCV RBC AUTO: 95.6 FL (ref 81–99)
MRSA SCREEN RT-PCR: POSITIVE
PH VENOUS: 7.26 (ref 7.31–7.41)
PLATELET # BLD: 147 THOU/MM3 (ref 130–400)
PMV BLD AUTO: 11.1 FL (ref 9.4–12.4)
POTASSIUM SERPL-SCNC: 4.6 MEQ/L (ref 3.5–5.2)
POTASSIUM SERPL-SCNC: 5 MEQ/L (ref 3.5–5.2)
POTASSIUM SERPL-SCNC: 5.4 MEQ/L (ref 3.5–5.2)
PROCALCITONIN: 0.14 NG/ML (ref 0.01–0.09)
RBC # BLD: 4.06 MILL/MM3 (ref 4.2–5.4)
SODIUM BLD-SCNC: 141 MEQ/L (ref 135–145)
SODIUM BLD-SCNC: 141 MEQ/L (ref 135–145)
SODIUM BLD-SCNC: 143 MEQ/L (ref 135–145)
SODIUM URINE: 30 MEQ/L
TROPONIN T: 0.35 NG/ML
TROPONIN T: 0.35 NG/ML
TROPONIN T: 0.41 NG/ML
VANCOMYCIN RESISTANT ENTEROCOCCUS: NEGATIVE
WBC # BLD: 12 THOU/MM3 (ref 4.8–10.8)

## 2020-02-24 PROCEDURE — 87500 VANOMYCIN DNA AMP PROBE: CPT

## 2020-02-24 PROCEDURE — 93306 TTE W/DOPPLER COMPLETE: CPT

## 2020-02-24 PROCEDURE — APPNB180 APP NON BILLABLE TIME > 60 MINS: Performed by: NURSE PRACTITIONER

## 2020-02-24 PROCEDURE — 36556 INSERT NON-TUNNEL CV CATH: CPT

## 2020-02-24 PROCEDURE — 36415 COLL VENOUS BLD VENIPUNCTURE: CPT

## 2020-02-24 PROCEDURE — 6370000000 HC RX 637 (ALT 250 FOR IP): Performed by: NURSE PRACTITIONER

## 2020-02-24 PROCEDURE — 2000000000 HC ICU R&B

## 2020-02-24 PROCEDURE — 82800 BLOOD PH: CPT

## 2020-02-24 PROCEDURE — 36592 COLLECT BLOOD FROM PICC: CPT

## 2020-02-24 PROCEDURE — 87081 CULTURE SCREEN ONLY: CPT

## 2020-02-24 PROCEDURE — APPSS30 APP SPLIT SHARED TIME 16-30 MINUTES: Performed by: NURSE PRACTITIONER

## 2020-02-24 PROCEDURE — 93010 ELECTROCARDIOGRAM REPORT: CPT | Performed by: INTERNAL MEDICINE

## 2020-02-24 PROCEDURE — 85730 THROMBOPLASTIN TIME PARTIAL: CPT

## 2020-02-24 PROCEDURE — 99222 1ST HOSP IP/OBS MODERATE 55: CPT | Performed by: INTERNAL MEDICINE

## 2020-02-24 PROCEDURE — 2700000000 HC OXYGEN THERAPY PER DAY

## 2020-02-24 PROCEDURE — 94761 N-INVAS EAR/PLS OXIMETRY MLT: CPT

## 2020-02-24 PROCEDURE — 87086 URINE CULTURE/COLONY COUNT: CPT

## 2020-02-24 PROCEDURE — 2709999900 HC NON-CHARGEABLE SUPPLY

## 2020-02-24 PROCEDURE — 87077 CULTURE AEROBIC IDENTIFY: CPT

## 2020-02-24 PROCEDURE — 84300 ASSAY OF URINE SODIUM: CPT

## 2020-02-24 PROCEDURE — 74176 CT ABD & PELVIS W/O CONTRAST: CPT

## 2020-02-24 PROCEDURE — 85027 COMPLETE CBC AUTOMATED: CPT

## 2020-02-24 PROCEDURE — 84145 PROCALCITONIN (PCT): CPT

## 2020-02-24 PROCEDURE — 71045 X-RAY EXAM CHEST 1 VIEW: CPT

## 2020-02-24 PROCEDURE — 99223 1ST HOSP IP/OBS HIGH 75: CPT | Performed by: INTERNAL MEDICINE

## 2020-02-24 PROCEDURE — 82570 ASSAY OF URINE CREATININE: CPT

## 2020-02-24 PROCEDURE — 83605 ASSAY OF LACTIC ACID: CPT

## 2020-02-24 PROCEDURE — 93005 ELECTROCARDIOGRAM TRACING: CPT | Performed by: NURSE PRACTITIONER

## 2020-02-24 PROCEDURE — 87641 MR-STAPH DNA AMP PROBE: CPT

## 2020-02-24 PROCEDURE — 2580000003 HC RX 258: Performed by: NURSE PRACTITIONER

## 2020-02-24 PROCEDURE — 87186 SC STD MICRODIL/AGAR DIL: CPT

## 2020-02-24 PROCEDURE — 82948 REAGENT STRIP/BLOOD GLUCOSE: CPT

## 2020-02-24 PROCEDURE — 6360000002 HC RX W HCPCS: Performed by: NURSE PRACTITIONER

## 2020-02-24 PROCEDURE — 80048 BASIC METABOLIC PNL TOTAL CA: CPT

## 2020-02-24 PROCEDURE — 76705 ECHO EXAM OF ABDOMEN: CPT

## 2020-02-24 PROCEDURE — 84484 ASSAY OF TROPONIN QUANT: CPT

## 2020-02-24 RX ORDER — PROMETHAZINE HYDROCHLORIDE 25 MG/1
12.5 TABLET ORAL EVERY 6 HOURS PRN
Status: DISCONTINUED | OUTPATIENT
Start: 2020-02-24 | End: 2020-02-28 | Stop reason: HOSPADM

## 2020-02-24 RX ORDER — DOCUSATE SODIUM 100 MG/1
100 CAPSULE, LIQUID FILLED ORAL 2 TIMES DAILY
Status: DISCONTINUED | OUTPATIENT
Start: 2020-02-24 | End: 2020-02-28 | Stop reason: HOSPADM

## 2020-02-24 RX ORDER — INSULIN GLARGINE 100 [IU]/ML
35 INJECTION, SOLUTION SUBCUTANEOUS NIGHTLY
Status: DISCONTINUED | OUTPATIENT
Start: 2020-02-24 | End: 2020-02-24

## 2020-02-24 RX ORDER — ONDANSETRON 2 MG/ML
4 INJECTION INTRAMUSCULAR; INTRAVENOUS EVERY 6 HOURS PRN
Status: DISCONTINUED | OUTPATIENT
Start: 2020-02-24 | End: 2020-02-28 | Stop reason: HOSPADM

## 2020-02-24 RX ORDER — HEPARIN SODIUM 1000 [USP'U]/ML
4000 INJECTION, SOLUTION INTRAVENOUS; SUBCUTANEOUS PRN
Status: DISCONTINUED | OUTPATIENT
Start: 2020-02-24 | End: 2020-02-25

## 2020-02-24 RX ORDER — DEXTROSE MONOHYDRATE 25 G/50ML
12.5 INJECTION, SOLUTION INTRAVENOUS PRN
Status: DISCONTINUED | OUTPATIENT
Start: 2020-02-24 | End: 2020-02-28 | Stop reason: HOSPADM

## 2020-02-24 RX ORDER — SODIUM CHLORIDE 9 MG/ML
INJECTION, SOLUTION INTRAVENOUS CONTINUOUS
Status: DISCONTINUED | OUTPATIENT
Start: 2020-02-24 | End: 2020-02-25

## 2020-02-24 RX ORDER — BISACODYL 10 MG
10 SUPPOSITORY, RECTAL RECTAL DAILY PRN
Status: DISCONTINUED | OUTPATIENT
Start: 2020-02-24 | End: 2020-02-28 | Stop reason: HOSPADM

## 2020-02-24 RX ORDER — VITAMIN B COMPLEX
2000 TABLET ORAL DAILY
Status: DISCONTINUED | OUTPATIENT
Start: 2020-02-24 | End: 2020-02-28 | Stop reason: HOSPADM

## 2020-02-24 RX ORDER — INSULIN GLARGINE 100 [IU]/ML
15 INJECTION, SOLUTION SUBCUTANEOUS NIGHTLY
Status: DISCONTINUED | OUTPATIENT
Start: 2020-02-24 | End: 2020-02-28 | Stop reason: HOSPADM

## 2020-02-24 RX ORDER — SODIUM CHLORIDE 0.9 % (FLUSH) 0.9 %
10 SYRINGE (ML) INJECTION PRN
Status: DISCONTINUED | OUTPATIENT
Start: 2020-02-24 | End: 2020-02-28 | Stop reason: HOSPADM

## 2020-02-24 RX ORDER — ACETAMINOPHEN 325 MG/1
650 TABLET ORAL EVERY 6 HOURS PRN
Status: DISCONTINUED | OUTPATIENT
Start: 2020-02-24 | End: 2020-02-28 | Stop reason: HOSPADM

## 2020-02-24 RX ORDER — ACETAMINOPHEN 650 MG/1
650 SUPPOSITORY RECTAL EVERY 6 HOURS PRN
Status: DISCONTINUED | OUTPATIENT
Start: 2020-02-24 | End: 2020-02-28 | Stop reason: HOSPADM

## 2020-02-24 RX ORDER — HEPARIN SODIUM 10000 [USP'U]/100ML
5 INJECTION, SOLUTION INTRAVENOUS CONTINUOUS
Status: DISCONTINUED | OUTPATIENT
Start: 2020-02-24 | End: 2020-02-25

## 2020-02-24 RX ORDER — HEPARIN SODIUM 1000 [USP'U]/ML
2000 INJECTION, SOLUTION INTRAVENOUS; SUBCUTANEOUS PRN
Status: DISCONTINUED | OUTPATIENT
Start: 2020-02-24 | End: 2020-02-25

## 2020-02-24 RX ORDER — SODIUM CHLORIDE 0.9 % (FLUSH) 0.9 %
10 SYRINGE (ML) INJECTION EVERY 12 HOURS SCHEDULED
Status: DISCONTINUED | OUTPATIENT
Start: 2020-02-24 | End: 2020-02-28 | Stop reason: HOSPADM

## 2020-02-24 RX ORDER — HEPARIN SODIUM 1000 [USP'U]/ML
4000 INJECTION, SOLUTION INTRAVENOUS; SUBCUTANEOUS ONCE
Status: COMPLETED | OUTPATIENT
Start: 2020-02-24 | End: 2020-02-24

## 2020-02-24 RX ORDER — NICOTINE POLACRILEX 4 MG
15 LOZENGE BUCCAL PRN
Status: DISCONTINUED | OUTPATIENT
Start: 2020-02-24 | End: 2020-02-28 | Stop reason: HOSPADM

## 2020-02-24 RX ORDER — DEXTROSE MONOHYDRATE 50 MG/ML
100 INJECTION, SOLUTION INTRAVENOUS PRN
Status: DISCONTINUED | OUTPATIENT
Start: 2020-02-24 | End: 2020-02-28 | Stop reason: HOSPADM

## 2020-02-24 RX ORDER — DONEPEZIL HYDROCHLORIDE 5 MG/1
5 TABLET, FILM COATED ORAL NIGHTLY
COMMUNITY

## 2020-02-24 RX ORDER — SERTRALINE HYDROCHLORIDE 100 MG/1
100 TABLET, FILM COATED ORAL DAILY
Status: DISCONTINUED | OUTPATIENT
Start: 2020-02-24 | End: 2020-02-28 | Stop reason: HOSPADM

## 2020-02-24 RX ORDER — ATORVASTATIN CALCIUM 40 MG/1
40 TABLET, FILM COATED ORAL DAILY
Status: DISCONTINUED | OUTPATIENT
Start: 2020-02-24 | End: 2020-02-28 | Stop reason: HOSPADM

## 2020-02-24 RX ORDER — ASPIRIN 81 MG/1
81 TABLET ORAL DAILY
Status: DISCONTINUED | OUTPATIENT
Start: 2020-02-24 | End: 2020-02-28 | Stop reason: HOSPADM

## 2020-02-24 RX ORDER — ASCORBIC ACID 500 MG
500 TABLET ORAL DAILY
Status: DISCONTINUED | OUTPATIENT
Start: 2020-02-24 | End: 2020-02-28 | Stop reason: HOSPADM

## 2020-02-24 RX ORDER — HYDRALAZINE HYDROCHLORIDE 50 MG/1
50 TABLET, FILM COATED ORAL 2 TIMES DAILY
Status: DISCONTINUED | OUTPATIENT
Start: 2020-02-24 | End: 2020-02-28 | Stop reason: HOSPADM

## 2020-02-24 RX ORDER — POLYETHYLENE GLYCOL 3350 17 G/17G
17 POWDER, FOR SOLUTION ORAL DAILY
Status: ON HOLD | COMMUNITY
End: 2022-10-26

## 2020-02-24 RX ORDER — DONEPEZIL HYDROCHLORIDE 5 MG/1
5 TABLET, FILM COATED ORAL NIGHTLY
Status: DISCONTINUED | OUTPATIENT
Start: 2020-02-24 | End: 2020-02-28 | Stop reason: HOSPADM

## 2020-02-24 RX ORDER — AMLODIPINE BESYLATE 10 MG/1
10 TABLET ORAL DAILY
Status: DISCONTINUED | OUTPATIENT
Start: 2020-02-24 | End: 2020-02-28 | Stop reason: HOSPADM

## 2020-02-24 RX ORDER — POLYETHYLENE GLYCOL 3350 17 G/17G
17 POWDER, FOR SOLUTION ORAL DAILY PRN
Status: DISCONTINUED | OUTPATIENT
Start: 2020-02-24 | End: 2020-02-28 | Stop reason: HOSPADM

## 2020-02-24 RX ORDER — BISACODYL 10 MG
10 SUPPOSITORY, RECTAL RECTAL DAILY PRN
Status: ON HOLD | COMMUNITY
End: 2022-10-26

## 2020-02-24 RX ORDER — PROPOFOL 10 MG/ML
INJECTION, EMULSION INTRAVENOUS
Status: DISCONTINUED
Start: 2020-02-24 | End: 2020-02-24 | Stop reason: WASHOUT

## 2020-02-24 RX ADMIN — SODIUM CHLORIDE: 9 INJECTION, SOLUTION INTRAVENOUS at 01:26

## 2020-02-24 RX ADMIN — PIPERACILLIN AND TAZOBACTAM 3.38 G: 3; .375 INJECTION, POWDER, LYOPHILIZED, FOR SOLUTION INTRAVENOUS at 14:45

## 2020-02-24 RX ADMIN — Medication 10 ML: at 20:53

## 2020-02-24 RX ADMIN — PIPERACILLIN AND TAZOBACTAM 3.38 G: 3; .375 INJECTION, POWDER, LYOPHILIZED, FOR SOLUTION INTRAVENOUS at 06:18

## 2020-02-24 RX ADMIN — PIPERACILLIN AND TAZOBACTAM 3.38 G: 3; .375 INJECTION, POWDER, LYOPHILIZED, FOR SOLUTION INTRAVENOUS at 20:52

## 2020-02-24 RX ADMIN — SODIUM CHLORIDE: 9 INJECTION, SOLUTION INTRAVENOUS at 10:25

## 2020-02-24 RX ADMIN — HEPARIN SODIUM 18 UNITS/KG/HR: 10000 INJECTION, SOLUTION INTRAVENOUS at 06:18

## 2020-02-24 RX ADMIN — INSULIN LISPRO 3 UNITS: 100 INJECTION, SOLUTION INTRAVENOUS; SUBCUTANEOUS at 10:23

## 2020-02-24 RX ADMIN — INSULIN LISPRO 8 UNITS: 100 INJECTION, SOLUTION INTRAVENOUS; SUBCUTANEOUS at 02:58

## 2020-02-24 RX ADMIN — HEPARIN SODIUM 4000 UNITS: 1000 INJECTION INTRAVENOUS; SUBCUTANEOUS at 06:18

## 2020-02-24 RX ADMIN — INSULIN GLARGINE 15 UNITS: 100 INJECTION, SOLUTION SUBCUTANEOUS at 21:01

## 2020-02-24 RX ADMIN — Medication 10 ML: at 10:24

## 2020-02-24 RX ADMIN — DOCUSATE SODIUM 100 MG: 100 CAPSULE, LIQUID FILLED ORAL at 20:52

## 2020-02-24 RX ADMIN — DONEPEZIL HYDROCHLORIDE 5 MG: 5 TABLET, FILM COATED ORAL at 20:53

## 2020-02-24 RX ADMIN — MAGNESIUM GLUCONATE 500 MG ORAL TABLET 400 MG: 500 TABLET ORAL at 20:52

## 2020-02-24 ASSESSMENT — PAIN SCALES - GENERAL
PAINLEVEL_OUTOF10: 0

## 2020-02-24 NOTE — ED NOTES
Core temp noted to be 93.8. 4 warm blankets placed on patient. Patient noted to be purple color in her face.       Trevor Morales RN  02/23/20 1910

## 2020-02-24 NOTE — ED NOTES
BP noted to be 67/53 in right arm. Left arm is 69/54. Patient remains in trendelenburg. Purple hue to face continues. Pulse is 59. EKG was given to Dr. Jesus Colvin. Charge RN Kaylee Perez notified of patient condition. Autumn JACQUES at bedside for bedside report.       Mario Isabel RN  02/23/20 [id number]

## 2020-02-24 NOTE — FLOWSHEET NOTE
02/24/20 1607   Encounter Summary   Services provided to: Patient   Referral/Consult From: Rounding   Place of [facility] [employer]   Contact Holiness No   Continue Visiting Yes  (2/24 yes for spiritual support.)   Complexity of Encounter Low   Length of Encounter 15 minutes   Grief and Life Adjustment   Type Adjustment to illness   Assessment Approachable;Calm   Intervention Sustaining presence/ Ministry of presence;Prayer   Outcome Coping      made an initial visit to offer spiritual support. Patient rested quietly in bed, with the lights off, during the unit's quiet time. No concerns were voiced or noted. Prayers were shared and spiritual needs are currently met. Spiritual care remains available.

## 2020-02-24 NOTE — ED NOTES
Xray at bedside.  Dr. Rita German confirmed placement of CVC     [facility] SANDEEP, RN  02/24/20 1717 .[address]. [address] [facility], RN  02/24/20 [id number]

## 2020-02-24 NOTE — ED NOTES
Lab called to state urine is insufficient amount. Spoke with DAVIDsTEA RN, explained patient not making urine and placement of garibay is correct. Fluids Continue.  This RN and Tammy Alvarez at bedside      [facility], [address] [place] [address]  02/23/20 1924

## 2020-02-24 NOTE — H&P
CRITICAL CARE H+P      Patient:  Natacha Rodríguez    Unit/Bed:EFRAIN Alba Pagan  YOB: 1940  MRN: [id number]   Acct: [de-identified]   PCP: Kallie Ruby MD  Date of Admission: 2/23/2020  Chief Complaint:- fever, hypotension, nausea and vomiting    Assessment and Plan:    1. Septic Shock: Levophed to maintain MAP>65. Looking at source, likely Urine. CT of ABD/PELVIS is pending  2. Complicated UTI: Culture is pending, Zosyn will be continued. 3. Acute Hyperkalemia, secondary to DHAVAL, and insulin deficiency, monitor and treat, repeat labs pending. 4. DHAVAL, nonoliguic, concerns of pre-renal versus ATN cause. Urine Elytes are pending, may need Nephrology input. No rash, elevated liver enzymes. IV fluids to continue, will need to be mindful patient only has one kidney. Dose medication to GFR, no NSAIDS to be given. 5. Elevated troponin, concerns of NSTEMI versus demand ischemia. Heparin gtt will be started. ECHO is pending. EKG no change from previous noted, RBBB. Trend troponin, may need Cardiology input. 6. DMT2, uncontrolled, will monitor with ACCU and SSI. 7. ASHD s/p CABG: history  8. Essential HPTN: history, patient is currently hypotensive. 9. Kidney cancer, s/p left nephrectomy, 2018, monitor,   10. Dyslipidemia, history  11. Morbid obesity: BMI 44.7    INITIAL H AND P AND ICU COURSE:  Codie Davidson is a 70year old female admitted to the ICU from [facility] ER 2/23/2020 with complaints of fever, hypotension, nausea and vomiting. Patient has a significant history of ASHD s/p CABG 1995, Essential HPTN-ECHO 2018 LVEF 60-65% with right ventricle systolic mildly reduced function, DMT2, Dyslipidemia, DVT right leg, Short term memory loss,  Kidney Cancer s/p left nephrectomy 2018, re-current UTI's. Patient resides at [facility]. Bed Bound up to the chair with sherry lift. Patient is a poor historian,  present at bedside contributing to information.  I did reach out to primary RN at [facility]  MRSA culture only:No results found for: [facility]  Urine culture: No results found for: LABURIN  Respiratory culture: No results found for: CULTRESP  Aerobic and Anaerobic :  No results found for: LABAERO  No results found for: LABANAE    Urinalysis:      Lab Results   Component Value Date    NITRU NEGATIVE 02/23/2020    WBCUA 50-75 02/23/2020    WBCUA 0-5 05/12/2018    BACTERIA MODERATE 02/23/2020    RBCUA > 100 02/23/2020    BLOODU LARGE 02/23/2020    SPECGRAV 1.021 02/03/2018    GLUCOSEU 250 02/23/2020       Radiology:(All radiographs, tracings, PFTs, and imaging are personally viewed and interpreted unless otherwise noted). XR CHEST PORTABLE   Final Result      Right-sided central venous catheter, tip in the region of the upper/mid SVC. No pneumothorax. Stable very mild central interstitial prominence, see above. **This report has been created using voice recognition software. It may contain minor errors which are inherent in voice recognition technology. **      Final report electronically signed by Dr. Jen Vogel on 2/24/2020 12:40 AM      XR CHEST PORTABLE   Final Result      Central line wire extending from the right neck into the medial right upper abdomen, see discussion above. No pneumothorax. Interstitial prominence, acute versus chronic. See discussion above and correlate clinically. Mild cardiomegaly status post CABG surgery. **This report has been created using voice recognition software. It may contain minor errors which are inherent in voice recognition technology. **      Final report electronically signed by Dr. Jen Vogel on 2/24/2020 12:31 AM      XR CHEST PORTABLE   Final Result   1. No acute intrathoracic process. 2. Mild stable cardiomegaly. **This report has been created using voice recognition software. It may contain minor errors which are inherent in voice recognition technology. **      Final report electronically signed by Dr. Padmini Lopez Jacklyn Aaron on 2/23/2020 7:29 PM      CT ABDOMEN PELVIS WO CONTRAST Additional Contrast? None    (Results Pending)     Xr Chest Portable    Result Date: 2/24/2020  PROCEDURE: XR CHEST PORTABLE CLINICAL INFORMATION: central line plcmt. COMPARISON: Chest x-ray earlier today at 12:14 AM TECHNIQUE: AP Portable chest xray FINDINGS: Lines/tubes/devices: Interval placement of a right central venous catheter which may course in the distribution of the external jugular vein, tip in the upper/mid SVC region. The previously noted guidewire has been removed. Lungs/pleura: There is stable very mild central interstitial prominence, again either representing pulmonary fibrosis versus very mild interstitial pulmonary edema or atypical pneumonia. A subcentimeter calcified granuloma projects over the left lung base. There is no consolidation. No pleural effusion. No pneumothorax. Heart: There is stable mild cardiomegaly. Status post CABG surgery. Mediastinum/corinne: No obvious mass or adenopathy. Skeleton: No significant bone or joint abnormality. Right-sided central venous catheter, tip in the region of the upper/mid SVC. No pneumothorax. Stable very mild central interstitial prominence, see above. **This report has been created using voice recognition software. It may contain minor errors which are inherent in voice recognition technology. ** Final report electronically signed by Dr. Brenda Bennett on 2/24/2020 12:40 AM    Xr Chest Portable    Result Date: 2/24/2020  PROCEDURE: XR CHEST PORTABLE CLINICAL INFORMATION: central line attempted. COMPARISON: Chest x-ray dated 2/23/2020 TECHNIQUE: AP Portable chest xray FINDINGS: Limitations: Extrinsic artifact lies over the chest. Lines/tubes/devices: There is a wire projecting in the region of the right neck, extending over the right side of the mediastinum into the upper abdomen consistent with the central line wire placement as noted in the provided history.  The wire courses in  the [] Neuro    [] Psych   [] Urology  [] ENT   [] Ragena Lili   []Ortho    []CV surg        [] Palliative  [] Hospice [] Pain management   []      []TCU   [] PT/OT  OTHERS:-    CODE STATUS:-  [x] Full resuscitation [] DNR-Comfort Care-Arrest  [] DNR-Comfort Care   [] Limited Resuscitation   [] No ET intubation   [] No CPR   [] No shock for non-perfusing rhythm    Meets Continued ICU Level Care Criteria:    [x] Yes   [] No - Transfer Planned to listed location:  [] HOSPITALIST CONTACTED-  (Name)    Electronically signed by NEELIMA Goyal Asp, CNP on 2/24/2020 at 1:01 AM  [address]    Total critical care time caring for this patient with life threatening, unstable organ failure, including direct patient contact, management of life support systems, review of data including imaging and labs, discussions with other team members and physicians at least 39  Min so far today, excluding procedures. Patient seen by me. Case discussed with nurse practitioner. Patient hypotensive requiring Levophed. On Zosyn for complicated urinary tract infection. Continue with supplemental oxygen. Volume resuscitate for acute kidney injury. Telemetry shows sinus rhythm. Electronically signed by Jase Jenkins MD.

## 2020-02-24 NOTE — CARE COORDINATION
2/24/20, 10:12 AM  DISCHARGE PLANNING EVALUATION:    Tung Martinez       Admitted from: ED 2/23/2020/ [facility] day: 1   Location: -06/006-A Reason for admit: Septic shock ([facility] [address] [A41.9, R65.21] Status: IP  Admit order signed?: no  PMH:  has a past medical history of Arthritis, CAD (coronary artery disease), CKD stage 2 due to type 2 diabetes mellitus ([address] DM (diabetes mellitus) ([address] DM retinopathy ([address] HTN (hypertension), Hyperlipidemia, Morbid obesity ([facility] [address] OAB (overactive bladder), and UTI (urinary tract infection). Procedure:   2/23 CXR: No acute process  2/24 CVC RIJ  2/24 CT Abd/pelvis: Findings suspicious for acute cholecystitis; No obvious biliary dilatation; Fluid adjacent to the second and proximal third portions of the duodenum which may be secondary to the suspected acute cholecystitis; Cannot exclude duodenitis; New 2 mm nonobstructing right upper pole renal calculus; Mild ascites adjacent to the spleen and in the pelvis  2/24 US GB/RUQ: No gallstones. Probable gallbladder wall thickening without wall edema or pericholecystic fluid; No biliary dilatation;  Pancreatic body and tail obscured by bowel gas  Medications:  Scheduled Meds:   amLODIPine  10 mg Oral Daily    aspirin  81 mg Oral Daily    atorvastatin  40 mg Oral Daily    Vitamin D  2,000 Units Oral Daily    docusate sodium  100 mg Oral BID    hydrALAZINE  50 mg Oral BID    magnesium oxide  400 mg Oral BID    insulin glargine  35 Units Subcutaneous Nightly    sodium chloride flush  10 mL Intravenous 2 times per day    piperacillin-tazobactam  3.375 g Intravenous Q8H    insulin lispro  0-12 Units Subcutaneous Q4H     Continuous Infusions:   sodium chloride 125 mL/hr at 02/24/20 0126    dextrose      heparin (porcine) 18 Units/kg/hr (02/24/20 0618)    norepinephrine 4 mcg/min (02/24/20 0745)      Pertinent Info/Orders/Treatment Plan: Presented from [facility] with s/o fever, hypotension, n/v. RN at [facility] reports was also confused and poorly responsive with BP 78/57. Poorly responsive to IV boluses in ED and was started on levophed drip. Nephrology consulted for DHAVAL. Tmax 100.2. NSR. Sats 99% on 3L O2. Oriented to person and place. Follows commands. Palliative Care consulted. Limited code - no x3, meds only. +MRSA nares. Telemetry, I&O, daily weight. IVF, heparin drip, levo @ 4 mcg/min, asa, lipitor, aricept, lantus, SSI Q4H, IV zosyn, zoloft. PO meds held this am d/t lethargy. Received 30 ml/kg in fld bolus, 1g 10% ca+ gluconate x1, 1 amp bicarb x1, IV vancomycin x1. K+ 6.3 - now 4.6, CO2 17 - now 22, BUN 24 - now 26, Creat 1.4 -now 1.5, AG 17 - now 12, LA 6.1 - then 2, procal 0.14, pro-bnp 1835, trop 0.31 - then 0.351, alb 3.1, wbc 13.4 - then 12, hgb 13.2 - now 11.6. Urine w/mod leukocytes - sent for culture. Blood cultures sent. Diet: Diet NPO Effective Now   Smoking status:  reports that she has never smoked. She has never used smokeless tobacco.   PCP: Collette Barrett, MD  Readmission 30 days or less: no  Readmission Risk Score: 20%    Discharge Planning Evaluation  Current Residence:     Living Arrangements:      Support Systems:     Current Services PTA:     Potential Assistance Needed:     Potential Assistance Purchasing Medications:     Does patient want to participate in local refill/ meds to beds program?     Type of Home Care Services:     Patient expects to be discharged to:     Expected Discharge date: Follow Up Appointment: Best Day/ Time:      Patient Goals/Plan/Treatment Preferences: Spoke with Ailyn's , Nubia Zamorano; states she has been at [facility] for approximately 3 years. She is bedbound there, only up to chair with sherry lift. Plan to return to [facility] at discharge. Transportation/Food Security/Housekeeping Addressed:  No issues identified.     Evaluation: no

## 2020-02-24 NOTE — PLAN OF CARE
Problem: Falls - Risk of:  Goal: Will remain free from falls  Description  Will remain free from falls  Outcome: Ongoing     Problem: Discharge Planning:  Goal: Participates in care planning  Description  Participates in care planning  Outcome: Ongoing     Problem: Discharge Planning:  Goal: Discharged to appropriate level of care  Description  Discharged to appropriate level of care  Outcome: Ongoing     Problem:  Bowel Function - Altered:  Goal: Bowel elimination is within specified parameters  Description  Bowel elimination is within specified parameters  Outcome: Ongoing     Problem: Cardiac Output - Decreased:  Goal: Hemodynamic stability will improve  Description  Hemodynamic stability will improve  Outcome: Ongoing     Problem: Fluid Volume - Imbalance:  Goal: Absence of imbalanced fluid volume signs and symptoms  Description  Absence of imbalanced fluid volume signs and symptoms  Outcome: Ongoing     Problem: Mental Status - Impaired:  Goal: Mental status will be restored to baseline  Description  Mental status will be restored to baseline  Outcome: Ongoing     Problem: Nutrition Deficit:  Goal: Ability to achieve adequate nutritional intake will improve  Description  Ability to achieve adequate nutritional intake will improve  Outcome: Ongoing     Problem: Serum Glucose Level - Abnormal:  Goal: Ability to maintain appropriate glucose levels will improve to within specified parameters  Description  Ability to maintain appropriate glucose levels will improve to within specified parameters  Outcome: Ongoing     Problem: Skin Integrity - Impaired:  Goal: Will show no infection signs and symptoms  Description  Will show no infection signs and symptoms  Outcome: Ongoing     Problem: Skin Integrity - Impaired:  Goal: Absence of new skin breakdown  Description  Absence of new skin breakdown  Outcome: Ongoing     Problem: Infection - Methicillin-Resistant Staphylococcus Aureus Infection:  Goal: Absence of

## 2020-02-24 NOTE — ED NOTES
O2Sat noted to be dropping to 88% on 2L. Patient increased to 4L NC. Purple hue continues to face. Patient noted to have increased rate of breathing at this time.       Nelda Paris RN  02/23/20 2626

## 2020-02-24 NOTE — PROGRESS NOTES
CRITICAL CARE PROGRESS NOTE      Patient:  Nathan Dexter    UDDR/UDO:[id number]  YOB: 1940  MRN: [id number]   Acct: [de-identified]   PCP: Diego Clemens MD  Date of Admission: 2/23/2020  Chief Complaint:- fever, hypotension, vomitting    Assessment and Plan:      1. Septic Shock:  (Improving) Source apperas urine. Max lactic acid 6.4-lactic acid after fluid resuscitation 2.0. PCT 0.14. WBC improving-12.0 from 13.4. S/P 30 ml/kg IV fluid bolus. Blood cultures, urine culture preliminary negative. Tmax last 24 hrs 100.2. Continue IV Levophed to maintain MAP>65.  CT of ABD/PELVIS demonstrated suspicion for acute cholecystitis; cannot exclude duodenitis; new 2 mm non-obstructing zeb upper pole calculus; mild ascites adjacent spleen, & in pelvis. However US gall bladder demonstrated probable gallbladder wall thickening without wall edema or pericholecystic ascitic fluid; no biliary dilation body and tail obscured by bowel gas ruling out abd source. Follow cultures-continue Zosyn. S/P IV Vanc-one dose. 2. Complicated UTI: U/A demonstrated mod leuk; WBC 50-75. Culture preliminary negative-follow. Continue Zosyn. 3. Acute Hypoxic Respiratory Failure:  (Ongoing) likely secondary to #1. Chest x-ray demonstrated stable very mild central interstitial prominence; either representing pulmonary fibrosis versus very mild interstitial pulmonary edema or atypical pneumonia. Continue to wean oxygen for pulse ox greater than 92%. 4. Acute Hyperkalemia:  (Ongoing-Improved) secondary to DHAVAL and insulin deficiency. Potassium 5.4-from 5.0 . Continue every four hr. BMP-Nephrology consulted. 5. Acute Kidney Injury:  (Oliguric)  concerns of pre-renal versus ATN cause. BUN 28/creatinine 1.8. Urine electrolytes demonstrated creatinine 209.9, sodium 30 inidcating pre-renal etiology. No rash or elevated liver enzymes. Consult Nephrology. Continue IV fluids.   Be mindful patient only has one Microbiology:    Blood culture #1:   Lab Results   Component Value Date    BC No growth-preliminary  02/23/2020     Blood culture #2:No results found for: BLOODCULT2  Organism:  Lab Results   Component Value Date    ORG Escherichia coli 12/15/2017    ORG Escherichia coli 12/15/2017       No results found for: [facility]  MRSA culture only:No results found for: [facility]  Urine culture: No results found for: LABURIN  Respiratory culture: No results found for: CULTRESP  Aerobic and Anaerobic :  No results found for: LABAERO  No results found for: LABANAE    Urinalysis:      Lab Results   Component Value Date    NITRU NEGATIVE 02/23/2020    WBCUA 50-75 02/23/2020    WBCUA 0-5 05/12/2018    BACTERIA MODERATE 02/23/2020    RBCUA > 100 02/23/2020    BLOODU LARGE 02/23/2020    SPECGRAV 1.021 02/03/2018    GLUCOSEU 250 02/23/2020       Radiology:(All radiographs, tracings, PFTs, and imaging are personally viewed and interpreted unless otherwise noted). US GALLBLADDER RUQ   Final Result      No gallstones. Probable gallbladder wall thickening without wall edema or pericholecystic fluid. No biliary dilatation. Pancreatic body and tail obscured by bowel gas. **This report has been created using voice recognition software. It may contain minor errors which are inherent in voice recognition technology. **      Final report electronically signed by Dr. Jennifer Palomares on 2/24/2020 6:19 AM      CT ABDOMEN PELVIS WO CONTRAST Additional Contrast? None   Final Result   Findings suspicious for acute cholecystitis. Consider gallbladder ultrasound for further evaluation. No obvious biliary dilatation. Fluid adjacent to the second and proximal third portions of the duodenum which may be secondary to the suspected acute cholecystitis. Cannot exclude duodenitis. New 2 mm nonobstructing right upper pole renal calculus. Mild ascites adjacent to the spleen and in the pelvis.             **This report has been created using voice small amount of fluid adjacent to the spleen. . No obvious abscess, pathologically enlarged lymph nodes, or mass. No pneumoperitoneum. Vascular: No abdominal aortic aneurysm. No significant vascular abnormality. Reproductive: The uterus is absent consistent with hysterectomy. The adnexal regions are unremarkable. Bladder: The bladder is empty due to the presence of a Martinez catheter. Body wall soft tissues: There is a small fat-containing umbilical hernia. . Musculoskeletal: There are degenerative changes of the thoracolumbar spine. There is no change in the slightly larger than 1 cm lytic lesion in the L5 vertebral body, stable dating back to the 2/8/2018 CT, likely a benign lesion, possibly a hemangioma. Findings suspicious for acute cholecystitis. Consider gallbladder ultrasound for further evaluation. No obvious biliary dilatation. Fluid adjacent to the second and proximal third portions of the duodenum which may be secondary to the suspected acute cholecystitis. Cannot exclude duodenitis. New 2 mm nonobstructing right upper pole renal calculus. Mild ascites adjacent to the spleen and in the pelvis. **This report has been created using voice recognition software. It may contain minor errors which are inherent in voice recognition technology. ** Final report electronically signed by Dr. Obed Banda on 2/24/2020 1:52 AM    Us Gallbladder Ruq    Result Date: 2/24/2020  PROCEDURE: US GALLBLADDER RUQ CLINICAL INFORMATION: acute cholecystitis, abdominal pain. COMPARISON: Noncontrast abdomen pelvis CT earlier today TECHNIQUE:Real-time transabdominal ultrasound was performed. FINDINGS: Pancreas: The pancreatic head is normal. The body and tail were obscured by bowel gas. Liver: Normal echogenicity. No mass or intrahepatic biliary dilatation. Size: Grossly normal size Gallbladder: There are no gallstones or sludge. There is probable gallbladder wall thickening. No wall edema or pericholecystic fluid.  Sonographic Ramos's Sign: Negative CBD: CBD diameter is normal measuring 2.4 mm. Right Kidney: Limited imaging of the right kidney demonstrates no hydronephrosis. Ascites: none     No gallstones. Probable gallbladder wall thickening without wall edema or pericholecystic fluid. No biliary dilatation. Pancreatic body and tail obscured by bowel gas. **This report has been created using voice recognition software. It may contain minor errors which are inherent in voice recognition technology. ** Final report electronically signed by Dr. Maddy Harper on 2/24/2020 6:19 AM    Xr Chest Portable    Result Date: 2/24/2020  PROCEDURE: XR CHEST PORTABLE CLINICAL INFORMATION: central line plcmt. COMPARISON: Chest x-ray earlier today at 12:14 AM TECHNIQUE: AP Portable chest xray FINDINGS: Lines/tubes/devices: Interval placement of a right central venous catheter which may course in the distribution of the external jugular vein, tip in the upper/mid SVC region. The previously noted guidewire has been removed. Lungs/pleura: There is stable very mild central interstitial prominence, again either representing pulmonary fibrosis versus very mild interstitial pulmonary edema or atypical pneumonia. A subcentimeter calcified granuloma projects over the left lung base. There is no consolidation. No pleural effusion. No pneumothorax. Heart: There is stable mild cardiomegaly. Status post CABG surgery. Mediastinum/corinne: No obvious mass or adenopathy. Skeleton: No significant bone or joint abnormality. Right-sided central venous catheter, tip in the region of the upper/mid SVC. No pneumothorax. Stable very mild central interstitial prominence, see above. **This report has been created using voice recognition software. It may contain minor errors which are inherent in voice recognition technology. ** Final report electronically signed by Dr. Maddy Harper on 2/24/2020 12:40 AM    Xr Chest Portable    Result Date: 2/24/2020  PROCEDURE: XR CHEST PORTABLE CLINICAL INFORMATION: central line attempted. COMPARISON: Chest x-ray dated 2/23/2020 TECHNIQUE: AP Portable chest xray FINDINGS: Limitations: Extrinsic artifact lies over the chest. Lines/tubes/devices: There is a wire projecting in the region of the right neck, extending over the right side of the mediastinum into the upper abdomen consistent with the central line wire placement as noted in the provided history. The wire courses in  the distribution of the right jugular vein, SVC, through the region of the right atrium into the region of the IVC. Distal tip is not imaged. Cannot confirm intravascular/intraluminal positioning on a chest x-ray. Lungs/pleura:  Interstitium is mildly prominent which may be chronic although cannot exclude mild pulmonary edema or an atypical pneumonia. There is no consolidation. No pleural effusion. No pneumothorax. Heart: There is stable mild cardiomegaly. Patient is status post sternotomy for CABG surgery. Mediastinum/corinne: No obvious mass or adenopathy. Skeleton: No significant bone or joint abnormality. Central line wire extending from the right neck into the medial right upper abdomen, see discussion above. No pneumothorax. Interstitial prominence, acute versus chronic. See discussion above and correlate clinically. Mild cardiomegaly status post CABG surgery. **This report has been created using voice recognition software. It may contain minor errors which are inherent in voice recognition technology. ** Final report electronically signed by Dr. Annabel Cheadle on 2/24/2020 12:31 AM    Xr Chest Portable    Result Date: 2/23/2020  PROCEDURE: XR CHEST PORTABLE CLINICAL INFORMATION: Sepsis TECHNIQUE: Mobile AP chest radiograph. COMPARISON: AP chest radiograph 3/23/2018 FINDINGS: Median sternotomy wires are again noted. There is mild stable enlargement of the cardiac silhouette. A calcified granuloma in the left lung is stable. There are no lung consolidations.  Degenerative

## 2020-02-24 NOTE — ED NOTES
O2sat continues to be in the 80s. Patient now placed on 6L/NC. O2 is now 92%. Dr. Paige Kelly made aware of O2sat and patients purple hue to face. No new orders.       Edwige Humphreys RN  02/23/20 1920

## 2020-02-24 NOTE — ED NOTES
Pt blood pressure stable at this time. PT levophed decreased at this time. Pt remains alert and oriented. Pt respirations are even and unlabored respirations are even and slightly labored at this time. Pt remains on 4 L NC.       Antonia Ferrera, RN  02/24/20 [facility], RN  02/24/20 [id number]

## 2020-02-24 NOTE — ED NOTES
Dr. Agnieszka Justice updated about blood pressure remaining low after 2 L of fluid. Pt started on Levophed at this time. Pt remains to have labored breathing and on 6 L NC. Pt is more alert at this time and is speaking with her .  states the pt has been fatigued and week for several days. Pt denies any pain at this time.      Leticia Mills RN  02/23/20 2023

## 2020-02-24 NOTE — CONSULTS
 Other Mother         TIA's    Cancer Father     Stroke Father     Cancer Paternal Grandmother      Medications & Allergies      Prior to Admission medications    Medication Sig Start Date End Date Taking?  Authorizing Provider   donepezil (ARICEPT) 5 MG tablet Take 5 mg by mouth nightly   Yes Historical Provider, MD   insulin detemir (LEVEMIR) 100 UNIT/ML injection vial Inject 35 Units into the skin nightly   Yes Historical Provider, MD   polyethylene glycol (GLYCOLAX) packet Take 17 g by mouth daily   Yes Historical Provider, MD   bisacodyl (DULCOLAX) 10 MG suppository Place 10 mg rectally daily as needed for Constipation   Yes Historical Provider, MD   vitamin C (ASCORBIC ACID) 500 MG tablet Take 500 mg by mouth daily   Yes Historical Provider, MD   sertraline (ZOLOFT) 100 MG tablet Take 1 tablet by mouth daily 8/3/18  Yes Amarjit Hansen MD   hydrochlorothiazide (HYDRODIURIL) 25 MG tablet Take 25 mg by mouth daily   Yes Historical Provider, MD   lisinopril (PRINIVIL;ZESTRIL) 40 MG tablet Take 40 mg by mouth daily   Yes Historical Provider, MD   metoprolol succinate (TOPROL XL) 50 MG extended release tablet Take 50 mg by mouth daily   Yes Historical Provider, MD   docusate sodium (COLACE) 100 MG capsule Take 100 mg by mouth 2 times daily   Yes Historical Provider, MD   aspirin 81 MG EC tablet Take 1 tablet by mouth daily 3/27/18  Yes Druscilla Rinne, APRN - CNP   Cholecalciferol (VITAMIN D3) 2000 units TABS Take 2,000 Units by mouth daily    Yes Historical Provider, MD   hydrALAZINE (APRESOLINE) 50 MG tablet Take 1 tablet by mouth 2 times daily 3/8/18  Yes Amarjit Hansen MD   Magnesium 400 MG CAPS Take 1 tablet by mouth 2 times daily 3/8/18  Yes Amarjit Hansen MD   methenamine (HIPREX) 1 g tablet Take 1 tablet by mouth 2 times daily (with meals) 1/31/18  Yes NEELIMA Dean CNP   solifenacin (VESICARE) 10 MG tablet Take 1 tablet by mouth daily 11/10/17  Yes Amarjit Hansen MD atorvastatin (LIPITOR) 40 MG tablet Take 1 tablet by mouth daily 10/21/17  Yes Kyle Damian MD   metFORMIN (GLUCOPHAGE) 500 MG tablet Take 1 tablet by mouth 2 times daily (with meals) 9/19/17  Yes Kyle Damian MD   amLODIPine (NORVASC) 10 MG tablet Take 1 tablet by mouth daily 9/19/17  Yes Kyle Damian MD   Insulin Aspart (NOVOLOG SC) Inject into the skin 4 times daily (before meals and nightly)     Historical Provider, MD   magnesium hydroxide (MILK OF MAGNESIA CONCENTRATE) 2400 MG/10ML SUSP Take 2,400 mg by mouth once as needed    Historical Provider, MD   Dulaglutide (TRULICITY) 9.91 RO/9.6HC SOPN Inject 0.75 mg into the skin once a week Takes on Mon 10/4/18   Kyle Damian MD   blood glucose monitor strips Test three times daily. One Touch ultra test strips 7/23/18   Kyle Damian MD   potassium chloride (KLOR-CON M) 20 MEQ extended release tablet Take 1 tablet by mouth daily 3/8/18   Kyle Damian MD   acetaminophen (TYLENOL) 500 MG tablet Take 500 mg by mouth every 6 hours as needed for Pain    Historical Provider, MD     Allergies: Patient has no known allergies.   IP meds : Scheduled Meds:   amLODIPine  10 mg Oral Daily    aspirin  81 mg Oral Daily    atorvastatin  40 mg Oral Daily    Vitamin D  2,000 Units Oral Daily    docusate sodium  100 mg Oral BID    hydrALAZINE  50 mg Oral BID    magnesium oxide  400 mg Oral BID    insulin glargine  35 Units Subcutaneous Nightly    sodium chloride flush  10 mL Intravenous 2 times per day    piperacillin-tazobactam  3.375 g Intravenous Q8H    insulin lispro  0-18 Units Subcutaneous Q4H    donepezil  5 mg Oral Nightly    sertraline  100 mg Oral Daily    vitamin C  500 mg Oral Daily     Continuous Infusions:   sodium chloride 125 mL/hr at 02/24/20 1025    dextrose      heparin (porcine) 18 Units/kg/hr (02/24/20 0618)    norepinephrine 4 mcg/min (02/24/20 0745)     Review of Systems Physical Exam   Review of Systems

## 2020-02-24 NOTE — ED PROVIDER NOTES
[address]         Pt Name: Nathan Dexter  MRN: [id number]  [place] 1940  Date of evaluation: 2/23/2020  Provider: Brandon Badillo MD    CHIEF COMPLAINT     Altered mental status    Nurses Notes reviewed and I agree except as noted in the HPI. HISTORY OF PRESENT ILLNESS    Nathan Dexter is a 70 y.o. female who presents from the nursing home for altered mental status. The patient awakens to speech and can provide limited verbal responses therefore am unable to provide a copy gets of review of systems. She is reportedly confused. She has denied any pain. This HPI was provided by the staff. REVIEW OF SYSTEMS     Review of Systems   Unable to perform ROS: Acuity of condition   Psychiatric/Behavioral: Positive for confusion. PAST MEDICAL HISTORY    has a past medical history of Arthritis, CAD (coronary artery disease), CKD stage 2 due to type 2 diabetes mellitus ([address] DM (diabetes mellitus) ([address] DM retinopathy ([address] HTN (hypertension), Hyperlipidemia, Morbid obesity ([address] OAB (overactive bladder), and UTI (urinary tract infection). SURGICAL HISTORY      has a past surgical history that includes tumor removal (1994); Hysterectomy (1994); Coronary artery bypass graft (1995); Knee arthroscopy (Right, ?); Total knee arthroplasty (Right); and pr lap, radical nephrectomy (Left, 3/21/2018).     CURRENT MEDICATIONS       Current Discharge Medication List      CONTINUE these medications which have NOT CHANGED    Details   vitamin C (ASCORBIC ACID) 500 MG tablet Take 500 mg by mouth daily      hydrochlorothiazide (HYDRODIURIL) 25 MG tablet Take 25 mg by mouth daily      lisinopril (PRINIVIL;ZESTRIL) 40 MG tablet Take 40 mg by mouth daily      metoprolol succinate (TOPROL XL) 50 MG extended release tablet Take 50 mg by mouth daily      docusate sodium (COLACE) 100 MG capsule Take 100 mg by mouth 2 times daily      aspirin 81 MG EC tablet Take 1 tablet by mouth daily  Qty: 30 tablet, Refills: 0      Cholecalciferol (VITAMIN D3) 2000 units TABS Take 2,000 Units by mouth daily       hydrALAZINE (APRESOLINE) 50 MG tablet Take 1 tablet by mouth 2 times daily  Qty: 180 tablet, Refills: 1    Associated Diagnoses: Essential hypertension      methenamine (HIPREX) 1 g tablet Take 1 tablet by mouth 2 times daily (with meals)  Qty: 60 tablet, Refills: 5      atorvastatin (LIPITOR) 40 MG tablet Take 1 tablet by mouth daily  Qty: 90 tablet, Refills: 1    Associated Diagnoses: Pure hypercholesterolemia      metFORMIN (GLUCOPHAGE) 500 MG tablet Take 1 tablet by mouth 2 times daily (with meals)  Qty: 180 tablet, Refills: 1    Associated Diagnoses: Type 2 diabetes mellitus with hyperglycemia, with long-term current use of insulin ([facility])      amLODIPine (NORVASC) 10 MG tablet Take 1 tablet by mouth daily  Qty: 90 tablet, Refills: 1    Associated Diagnoses: Essential hypertension      Insulin Aspart (NOVOLOG SC) Inject into the skin      magnesium hydroxide (MILK OF MAGNESIA CONCENTRATE) 2400 MG/10ML SUSP Take 2,400 mg by mouth once as needed      Dulaglutide (TRULICITY) 0.41 KT/6.5RX SOPN Inject 0.75 mg into the skin once a week Takes on Mon  Qty: 2 pen, Refills: 3      insulin glargine (LANTUS SOLOSTAR) 100 UNIT/ML injection pen Inject 32 Units into the skin nightly  Qty: 4 pen, Refills: 0    Comments: Lot# [id number]  Exp: 6/30/2020      sertraline (ZOLOFT) 100 MG tablet Take 1 tablet by mouth daily  Qty: 30 tablet, Refills: 5      blood glucose monitor strips Test three times daily. One Touch ultra test strips  Qty: 100 strip, Refills: 3    Associated Diagnoses: Diabetes mellitus without complication (HCC)      ALPRAZolam (XANAX) 0.25 MG tablet Take 0.25 mg by mouth 3 times daily as needed for Anxiety. .      miconazole (MICOTIN) 2 % cream Apply topically 2 times daily Apply topically 2 times daily.  To coccyx and buttocks      nystatin (MYCOSTATIN) 747400 UNIT/GM powder Apply topically Apply to breast and groin every shift      Magnesium 400 MG CAPS Take 1 tablet by mouth 2 times daily  Qty: 180 capsule, Refills: 1    Associated Diagnoses: Essential hypertension      potassium chloride (KLOR-CON M) 20 MEQ extended release tablet Take 1 tablet by mouth daily  Qty: 90 tablet, Refills: 1    Associated Diagnoses: Essential hypertension      acetaminophen (TYLENOL) 500 MG tablet Take 500 mg by mouth every 6 hours as needed for Pain      solifenacin (VESICARE) 10 MG tablet Take 1 tablet by mouth daily  Qty: 90 tablet, Refills: 1             ALLERGIES     has No Known Allergies. FAMILY HISTORY     She indicated that her mother is . She indicated that her father is . She indicated that her sister is alive. She indicated that her brother is alive. She indicated that the status of her paternal grandmother is unknown.   family history includes Arthritis in her mother; Cancer in her father and paternal grandmother; Coronary Art Dis in her mother; Diabetes in her mother; High Blood Pressure in her mother; Other in her mother; Stroke in her father. SOCIAL HISTORY      reports that she has never smoked. She has never used smokeless tobacco. She reports that she does not drink alcohol or use drugs. PHYSICAL EXAM       ED Triage Vitals      BP Temp Temp Source Pulse Resp SpO2 Height Weight   (!) 75/51 94.8 °F (34.9 °C) Axillary 62 24 90 % -- --      Physical Exam  Vitals signs and nursing note reviewed. Constitutional:       General: She is in acute distress. Appearance: She is obese. She is ill-appearing and toxic-appearing. She is not diaphoretic. Interventions: Nasal cannula in place. HENT:      Head: Normocephalic. Right Ear: External ear normal. No drainage. Left Ear: External ear normal. No drainage. Nose: Nose normal.      Mouth/Throat:      Mouth: Mucous membranes are not dry and not cyanotic.    Eyes:      General: No scleral icterus. Right eye: No discharge. Left eye: No discharge. Conjunctiva/sclera: Conjunctivae normal.   Neck:      Musculoskeletal: Normal range of motion and neck supple. Trachea: Trachea and phonation normal. No tracheal deviation. Cardiovascular:      Rate and Rhythm: Normal rate and regular rhythm. Pulses:           Radial pulses are 2+ on the right side. Heart sounds: Normal heart sounds. No murmur. No friction rub. No gallop. Pulmonary:      Effort: Pulmonary effort is normal. Tachypnea present. No respiratory distress. Breath sounds: Normal breath sounds. No stridor. No wheezing or rales. Chest:      Chest wall: No tenderness. Abdominal:      General: Bowel sounds are normal. There is no distension. Palpations: Abdomen is soft. There is no pulsatile mass. Tenderness: There is no abdominal tenderness. There is no guarding or rebound. Musculoskeletal: Normal range of motion. General: No tenderness (No calf pain or tenderness. No evidence of DVT). Skin:     General: Skin is warm and dry. Coloration: Skin is not pale. Findings: No erythema or rash (on exposed surfaced). Neurological:      Mental Status: She is lethargic and confused. GCS: GCS eye subscore is 3. GCS verbal subscore is 4. GCS motor subscore is 6. Motor: No tremor or seizure activity. Coordination: Coordination normal.   Psychiatric:         Attention and Perception: She is inattentive. Speech: Speech is delayed. Behavior: Behavior is slowed and withdrawn. Behavior is cooperative. Cognition and Memory: Cognition is impaired. Memory is impaired. DIFFERENTIAL DIAGNOSIS:   SIRS rule out sepsis due to possible urinary tract infection or occult pneumonia, bacteremia.     DIAGNOSTIC RESULTS     EKG: All EKG's are interpreted by the Emergency Department Physician    EKG interpreted by me shows sinus rhythm with a first-degree AV Range    Lactic Acid, Sepsis 6.1 (H) 0.5 - 1.9 mmol/L   Blood gas, arterial   Result Value Ref Range    pH, Blood Gas 7.31 (L) 7.35 - 7.45    PCO2 37 35 - 45 mmhg    PO2 77 71 - 104 mmhg    HCO3 19 (L) 23 - 28 mmol/l    Base Excess (Calculated) -6.7 (L) -2.5 - 2.5 mmol/l    O2 Sat 94 %    IFIO2 6     DEVICE Cannula     Jaylon Test Positive     Source: L Radial     COLLECTED BY: [id number]    CBC auto differential   Result Value Ref Range    WBC 13.4 (H) 4.8 - 10.8 thou/mm3    RBC 4.72 4.20 - 5.40 mill/mm3    Hemoglobin 13.2 12.0 - 16.0 gm/dl    Hematocrit 45.0 37.0 - 47.0 %    MCV 95.3 81.0 - 99.0 fL    MCH 28.0 26.0 - 33.0 pg    MCHC 29.3 (L) 32.2 - 35.5 gm/dl    RDW-CV 14.3 11.5 - 14.5 %    RDW-SD 50.0 (H) 35.0 - 45.0 fL    Platelets 067 709 - 586 thou/mm3    MPV 11.9 9.4 - 12.4 fL    Seg Neutrophils 79.4 %    Lymphocytes 11.4 %    Monocytes 7.9 %    Eosinophils 0.2 %    Basophils 0.4 %    Immature Granulocytes 0.7 %    Segs Absolute 10.6 (H) 1.8 - 7.7 thou/mm3    Lymphocytes Absolute 1.5 1.0 - 4.8 thou/mm3    Monocytes Absolute 1.1 0.4 - 1.3 thou/mm3    Eosinophils Absolute 0.0 0.0 - 0.4 thou/mm3    Basophils Absolute 0.1 0.0 - 0.1 thou/mm3    Immature Grans (Abs) 0.10 (H) 0.00 - 0.07 thou/mm3    nRBC 0 /100 wbc   Ammonia   Result Value Ref Range    Ammonia 59 11 - 60 umol/L   SPECIMEN REJECTION   Result Value Ref Range    Rejected Test chem     Reason for Rejection see below    Basic Metabolic Panel w/ Reflex to MG   Result Value Ref Range    Sodium 140 135 - 145 meq/L    Potassium reflex Magnesium 6.3 (HH) 3.5 - 5.2 meq/L    Chloride 106 98 - 111 meq/L    CO2 17 (L) 23 - 33 meq/L    Glucose 312 (H) 70 - 108 mg/dL    BUN 24 (H) 7 - 22 mg/dL    CREATININE 1.4 (H) 0.4 - 1.2 mg/dL    Calcium 8.7 8.5 - 10.5 mg/dL   Hepatic Function Panel   Result Value Ref Range    Alb 3.1 (L) 3.5 - 5.1 g/dL    Total Bilirubin 0.4 0.3 - 1.2 mg/dL    Bilirubin, Direct <0.2 0.0 - 0.3 mg/dL    Alkaline Phosphatase 65 38 - 126 U/L    AST 37

## 2020-02-24 NOTE — FLOWSHEET NOTE
0105 to ICU room 6 from ED, pt on levophed infusion. Report received and care assumed by Emily RN. Pt alert, calm, cooperative. Appears w/o distress. Intensivist CNP to bedside for assessment on arrival.  See assessment flow sheets for further details.

## 2020-02-25 ENCOUNTER — APPOINTMENT (OUTPATIENT)
Dept: CT IMAGING | Age: 80
DRG: 871 | End: 2020-02-25
Payer: MEDICARE

## 2020-02-25 LAB
AMYLASE: 31 U/L (ref 20–104)
ANION GAP SERPL CALCULATED.3IONS-SCNC: 11 MEQ/L (ref 8–16)
APTT: 33 SECONDS (ref 22–38)
APTT: 38.6 SECONDS (ref 22–38)
APTT: 67.2 SECONDS (ref 22–38)
BASOPHILS # BLD: 0.4 %
BASOPHILS ABSOLUTE: 0 THOU/MM3 (ref 0–0.1)
BUN BLDV-MCNC: 20 MG/DL (ref 7–22)
CALCIUM SERPL-MCNC: 8.1 MG/DL (ref 8.5–10.5)
CHLORIDE BLD-SCNC: 112 MEQ/L (ref 98–111)
CO2: 22 MEQ/L (ref 23–33)
CREAT SERPL-MCNC: 1.1 MG/DL (ref 0.4–1.2)
EKG ATRIAL RATE: 82 BPM
EKG Q-T INTERVAL: 382 MS
EKG QRS DURATION: 150 MS
EKG QTC CALCULATION (BAZETT): 507 MS
EKG R AXIS: 87 DEGREES
EKG T AXIS: -35 DEGREES
EKG VENTRICULAR RATE: 106 BPM
EOSINOPHIL # BLD: 0.9 %
EOSINOPHILS ABSOLUTE: 0.1 THOU/MM3 (ref 0–0.4)
ERYTHROCYTE [DISTWIDTH] IN BLOOD BY AUTOMATED COUNT: 14.5 % (ref 11.5–14.5)
ERYTHROCYTE [DISTWIDTH] IN BLOOD BY AUTOMATED COUNT: 50.4 FL (ref 35–45)
GFR SERPL CREATININE-BSD FRML MDRD: 48 ML/MIN/1.73M2
GLUCOSE BLD-MCNC: 108 MG/DL (ref 70–108)
GLUCOSE BLD-MCNC: 111 MG/DL (ref 70–108)
GLUCOSE BLD-MCNC: 119 MG/DL (ref 70–108)
GLUCOSE BLD-MCNC: 125 MG/DL (ref 70–108)
GLUCOSE BLD-MCNC: 135 MG/DL (ref 70–108)
GLUCOSE BLD-MCNC: 136 MG/DL (ref 70–108)
GLUCOSE BLD-MCNC: 156 MG/DL (ref 70–108)
GLUCOSE BLD-MCNC: 189 MG/DL (ref 70–108)
HCT VFR BLD CALC: 34.2 % (ref 37–47)
HEMOGLOBIN: 10 GM/DL (ref 12–16)
IMMATURE GRANS (ABS): 0.02 THOU/MM3 (ref 0–0.07)
IMMATURE GRANULOCYTES: 0.3 %
LIPASE: 24.5 U/L (ref 5.6–51.3)
LYMPHOCYTES # BLD: 21 %
LYMPHOCYTES ABSOLUTE: 1.4 THOU/MM3 (ref 1–4.8)
MAGNESIUM: 1.6 MG/DL (ref 1.6–2.4)
MCH RBC QN AUTO: 27.9 PG (ref 26–33)
MCHC RBC AUTO-ENTMCNC: 29.2 GM/DL (ref 32.2–35.5)
MCV RBC AUTO: 95.3 FL (ref 81–99)
MONOCYTES # BLD: 11.1 %
MONOCYTES ABSOLUTE: 0.7 THOU/MM3 (ref 0.4–1.3)
NUCLEATED RED BLOOD CELLS: 0 /100 WBC
ORGANISM: ABNORMAL
PLATELET # BLD: 114 THOU/MM3 (ref 130–400)
PMV BLD AUTO: 11.2 FL (ref 9.4–12.4)
POTASSIUM SERPL-SCNC: 4.3 MEQ/L (ref 3.5–5.2)
POTASSIUM SERPL-SCNC: 4.4 MEQ/L (ref 3.5–5.2)
RBC # BLD: 3.59 MILL/MM3 (ref 4.2–5.4)
SEG NEUTROPHILS: 66.3 %
SEGMENTED NEUTROPHILS ABSOLUTE COUNT: 4.4 THOU/MM3 (ref 1.8–7.7)
SODIUM BLD-SCNC: 145 MEQ/L (ref 135–145)
TROPONIN T: 0.29 NG/ML
URINE CULTURE REFLEX: ABNORMAL
URINE CULTURE REFLEX: ABNORMAL
WBC # BLD: 6.7 THOU/MM3 (ref 4.8–10.8)

## 2020-02-25 PROCEDURE — 85027 COMPLETE CBC AUTOMATED: CPT

## 2020-02-25 PROCEDURE — 80048 BASIC METABOLIC PNL TOTAL CA: CPT

## 2020-02-25 PROCEDURE — 36415 COLL VENOUS BLD VENIPUNCTURE: CPT

## 2020-02-25 PROCEDURE — 6370000000 HC RX 637 (ALT 250 FOR IP): Performed by: NURSE PRACTITIONER

## 2020-02-25 PROCEDURE — 82150 ASSAY OF AMYLASE: CPT

## 2020-02-25 PROCEDURE — 94760 N-INVAS EAR/PLS OXIMETRY 1: CPT

## 2020-02-25 PROCEDURE — 84484 ASSAY OF TROPONIN QUANT: CPT

## 2020-02-25 PROCEDURE — 6360000004 HC RX CONTRAST MEDICATION: Performed by: INTERNAL MEDICINE

## 2020-02-25 PROCEDURE — 6360000002 HC RX W HCPCS: Performed by: INTERNAL MEDICINE

## 2020-02-25 PROCEDURE — 2500000003 HC RX 250 WO HCPCS: Performed by: NURSE PRACTITIONER

## 2020-02-25 PROCEDURE — 2580000003 HC RX 258: Performed by: NURSE PRACTITIONER

## 2020-02-25 PROCEDURE — 99233 SBSQ HOSP IP/OBS HIGH 50: CPT | Performed by: INTERNAL MEDICINE

## 2020-02-25 PROCEDURE — 36592 COLLECT BLOOD FROM PICC: CPT

## 2020-02-25 PROCEDURE — 2000000000 HC ICU R&B

## 2020-02-25 PROCEDURE — 6360000002 HC RX W HCPCS: Performed by: NURSE PRACTITIONER

## 2020-02-25 PROCEDURE — 2709999900 HC NON-CHARGEABLE SUPPLY

## 2020-02-25 PROCEDURE — 99232 SBSQ HOSP IP/OBS MODERATE 35: CPT | Performed by: INTERNAL MEDICINE

## 2020-02-25 PROCEDURE — 83735 ASSAY OF MAGNESIUM: CPT

## 2020-02-25 PROCEDURE — 84132 ASSAY OF SERUM POTASSIUM: CPT

## 2020-02-25 PROCEDURE — 85025 COMPLETE CBC W/AUTO DIFF WBC: CPT

## 2020-02-25 PROCEDURE — 71275 CT ANGIOGRAPHY CHEST: CPT

## 2020-02-25 PROCEDURE — 82948 REAGENT STRIP/BLOOD GLUCOSE: CPT

## 2020-02-25 PROCEDURE — 2580000003 HC RX 258: Performed by: INTERNAL MEDICINE

## 2020-02-25 PROCEDURE — 93005 ELECTROCARDIOGRAM TRACING: CPT | Performed by: NURSE PRACTITIONER

## 2020-02-25 PROCEDURE — 83690 ASSAY OF LIPASE: CPT

## 2020-02-25 PROCEDURE — APPSS30 APP SPLIT SHARED TIME 16-30 MINUTES: Performed by: NURSE PRACTITIONER

## 2020-02-25 PROCEDURE — 99223 1ST HOSP IP/OBS HIGH 75: CPT | Performed by: INTERNAL MEDICINE

## 2020-02-25 PROCEDURE — 85730 THROMBOPLASTIN TIME PARTIAL: CPT

## 2020-02-25 PROCEDURE — 93010 ELECTROCARDIOGRAM REPORT: CPT | Performed by: INTERNAL MEDICINE

## 2020-02-25 PROCEDURE — 2700000000 HC OXYGEN THERAPY PER DAY

## 2020-02-25 RX ORDER — HEPARIN SODIUM 1000 [USP'U]/ML
40 INJECTION, SOLUTION INTRAVENOUS; SUBCUTANEOUS PRN
Status: DISCONTINUED | OUTPATIENT
Start: 2020-02-25 | End: 2020-02-26 | Stop reason: ALTCHOICE

## 2020-02-25 RX ORDER — SODIUM CHLORIDE 450 MG/100ML
INJECTION, SOLUTION INTRAVENOUS CONTINUOUS
Status: DISCONTINUED | OUTPATIENT
Start: 2020-02-25 | End: 2020-02-26

## 2020-02-25 RX ORDER — HEPARIN SODIUM 10000 [USP'U]/100ML
18 INJECTION, SOLUTION INTRAVENOUS CONTINUOUS
Status: DISCONTINUED | OUTPATIENT
Start: 2020-02-25 | End: 2020-02-26 | Stop reason: ALTCHOICE

## 2020-02-25 RX ORDER — MAGNESIUM SULFATE HEPTAHYDRATE 40 MG/ML
1 INJECTION, SOLUTION INTRAVENOUS ONCE
Status: DISCONTINUED | OUTPATIENT
Start: 2020-02-25 | End: 2020-02-25

## 2020-02-25 RX ORDER — HEPARIN SODIUM 1000 [USP'U]/ML
80 INJECTION, SOLUTION INTRAVENOUS; SUBCUTANEOUS PRN
Status: DISCONTINUED | OUTPATIENT
Start: 2020-02-25 | End: 2020-02-26 | Stop reason: ALTCHOICE

## 2020-02-25 RX ORDER — METOPROLOL TARTRATE 5 MG/5ML
2.5 INJECTION INTRAVENOUS EVERY 8 HOURS SCHEDULED
Status: DISCONTINUED | OUTPATIENT
Start: 2020-02-25 | End: 2020-02-26

## 2020-02-25 RX ORDER — SODIUM CHLORIDE, SODIUM LACTATE, POTASSIUM CHLORIDE, CALCIUM CHLORIDE 600; 310; 30; 20 MG/100ML; MG/100ML; MG/100ML; MG/100ML
INJECTION, SOLUTION INTRAVENOUS CONTINUOUS
Status: DISCONTINUED | OUTPATIENT
Start: 2020-02-25 | End: 2020-02-25

## 2020-02-25 RX ORDER — 0.9 % SODIUM CHLORIDE 0.9 %
1000 INTRAVENOUS SOLUTION INTRAVENOUS ONCE
Status: COMPLETED | OUTPATIENT
Start: 2020-02-25 | End: 2020-02-25

## 2020-02-25 RX ADMIN — PIPERACILLIN AND TAZOBACTAM 3.38 G: 3; .375 INJECTION, POWDER, LYOPHILIZED, FOR SOLUTION INTRAVENOUS at 05:01

## 2020-02-25 RX ADMIN — OXYCODONE HYDROCHLORIDE AND ACETAMINOPHEN 500 MG: 500 TABLET ORAL at 08:56

## 2020-02-25 RX ADMIN — SODIUM CHLORIDE, POTASSIUM CHLORIDE, SODIUM LACTATE AND CALCIUM CHLORIDE: 600; 310; 30; 20 INJECTION, SOLUTION INTRAVENOUS at 09:07

## 2020-02-25 RX ADMIN — Medication 10 ML: at 21:10

## 2020-02-25 RX ADMIN — Medication 10 ML: at 08:58

## 2020-02-25 RX ADMIN — INSULIN LISPRO 3 UNITS: 100 INJECTION, SOLUTION INTRAVENOUS; SUBCUTANEOUS at 13:44

## 2020-02-25 RX ADMIN — INSULIN LISPRO 3 UNITS: 100 INJECTION, SOLUTION INTRAVENOUS; SUBCUTANEOUS at 17:01

## 2020-02-25 RX ADMIN — IOPAMIDOL 80 ML: 755 INJECTION, SOLUTION INTRAVENOUS at 17:53

## 2020-02-25 RX ADMIN — ATORVASTATIN CALCIUM 40 MG: 40 TABLET, FILM COATED ORAL at 08:56

## 2020-02-25 RX ADMIN — MAGNESIUM GLUCONATE 500 MG ORAL TABLET 400 MG: 500 TABLET ORAL at 21:06

## 2020-02-25 RX ADMIN — DOCUSATE SODIUM 100 MG: 100 CAPSULE, LIQUID FILLED ORAL at 08:56

## 2020-02-25 RX ADMIN — SODIUM CHLORIDE: 4.5 INJECTION, SOLUTION INTRAVENOUS at 20:21

## 2020-02-25 RX ADMIN — METOPROLOL TARTRATE 2.5 MG: 5 INJECTION INTRAVENOUS at 15:12

## 2020-02-25 RX ADMIN — PIPERACILLIN AND TAZOBACTAM 3.38 G: 3; .375 INJECTION, POWDER, LYOPHILIZED, FOR SOLUTION INTRAVENOUS at 13:45

## 2020-02-25 RX ADMIN — CEFTRIAXONE SODIUM 1 G: 1 INJECTION, POWDER, FOR SOLUTION INTRAMUSCULAR; INTRAVENOUS at 20:59

## 2020-02-25 RX ADMIN — MAGNESIUM GLUCONATE 500 MG ORAL TABLET 400 MG: 500 TABLET ORAL at 08:56

## 2020-02-25 RX ADMIN — SERTRALINE 100 MG: 100 TABLET, FILM COATED ORAL at 08:56

## 2020-02-25 RX ADMIN — SODIUM CHLORIDE 1000 ML: 9 INJECTION, SOLUTION INTRAVENOUS at 18:15

## 2020-02-25 RX ADMIN — ASPIRIN 81 MG: 81 TABLET ORAL at 08:56

## 2020-02-25 RX ADMIN — DONEPEZIL HYDROCHLORIDE 5 MG: 5 TABLET, FILM COATED ORAL at 21:06

## 2020-02-25 RX ADMIN — VITAMIN D, TAB 1000IU (100/BT) 2000 UNITS: 25 TAB at 08:56

## 2020-02-25 RX ADMIN — MAGNESIUM SULFATE HEPTAHYDRATE 1 G: 500 INJECTION, SOLUTION INTRAMUSCULAR; INTRAVENOUS at 16:58

## 2020-02-25 RX ADMIN — HEPARIN SODIUM 2000 UNITS: 1000 INJECTION INTRAVENOUS; SUBCUTANEOUS at 12:44

## 2020-02-25 RX ADMIN — METOPROLOL TARTRATE 2.5 MG: 5 INJECTION INTRAVENOUS at 22:18

## 2020-02-25 RX ADMIN — DOCUSATE SODIUM 100 MG: 100 CAPSULE, LIQUID FILLED ORAL at 21:06

## 2020-02-25 RX ADMIN — HEPARIN SODIUM 5 UNITS/KG/HR: 10000 INJECTION, SOLUTION INTRAVENOUS at 11:01

## 2020-02-25 ASSESSMENT — PAIN SCALES - GENERAL
PAINLEVEL_OUTOF10: 0

## 2020-02-25 NOTE — CARE COORDINATION
DISCHARGE/PLANNING EVALUATION  2/25/20, 1:33 PM    Reason for Referral:  \"[facility]\"  Mental Status:  Alert with some confusion   Decision Making:  Makes decisions with her spouse  Family/Social/Home Environment:  Patient is a resident of [facility]. SW spoke to Ramya in admissions. He reports that she has been at their ECF since Sept 2018. She has a Medicaid bed hold he would need to check with therapy there if she can be skilled under her [address] Medicare if pre-cert is approved  Current Services including food security, transportation and housekeeping:   All provided by ECF  Current Equipment:  All provided by [facility]  Payment Source:  [employer] and Medicaid  Concerns or Barriers to Discharge:  None voiced at this time. Per the CM note, she had spoken to the spouse who wants her to return to [facility]  Post acute provider list with quality measures, geographic area and applicable managed care information provided. Questions regarding selection process answered: N/A    Teach Back Method used with ECF staff regarding care plan and needs  ECF staff verbalize understanding of the plan of care and contribute to goal setting. Patient goals, treatment preferences and discharge plan:   At this time, plan return to [facility], may not need pre-cert    Electronically signed by MIAH Villegas on 2/25/2020 at 1:33 PM

## 2020-02-25 NOTE — PROGRESS NOTES
Leonid Flaherty RN. Palliative care will continue to follow and assist as appropriate.     Chris Moreno RN  2/25/2020,  3:11 PM

## 2020-02-25 NOTE — DISCHARGE INSTR - COC
done    Treatments at the Time of Hospital Discharge:   Respiratory Treatments:  NA  Oxygen Therapy:  is on oxygen at 2 L/min per nasal cannula. Ventilator:    - No ventilator support    Rehab Therapies: NA  Weight Bearing Status/Restrictions: No weight bearing restirctions  Other Medical Equipment (for information only, NOT a DME order):  hospital bed  Other Treatments:  NA    Patient's personal belongings (please select all that are sent with patient):  None    RN SIGNATURE:  Electronically signed by Zechariah Yadav RN on 2/28/20 at 2:54 PM    CASE MANAGEMENT/SOCIAL WORK SECTION    Inpatient Status Date:  02/23/2020    Readmission Risk Assessment Score:  Readmission Risk              Risk of Unplanned Readmission:        18           Discharging to Facility/ Agency   · Name:  [facility]  · Address:  [place] [address]  · Phone:  [phone]  · Fax:  [phone]    Dialysis Facility (if applicable)   · Name:  · Address:  · Dialysis Schedule:  · Phone:  · Fax:    / signature: Electronically signed by MIAH Asencio on 2/25/20 at 1:52 PM    PHYSICIAN SECTION    Prognosis: {Prognosis:3481809173}    Condition at Discharge: [address] [place] [address] Patient Condition:[id number]}    Rehab Potential (if transferring to Rehab): {Prognosis:6097943589}    Recommended Labs or Other Treatments After Discharge: ***    Physician Certification: I certify the above information and transfer of Екатерина Peters  is necessary for the continuing treatment of the diagnosis listed and that she requires {Admit to Appropriate Level of Care:93985} for {GREATER/LESS:586111385} 30 days.      Update Admission H&P: {CHP DME Changes in ITLBJ:293263270}    PHYSICIAN SIGNATURE:  {Esignature:[id number]}

## 2020-02-25 NOTE — PROGRESS NOTES
vitamin C  500 mg Oral Daily    insulin glargine  15 Units Subcutaneous Nightly     Continuous Infusions:   sodium chloride 125 mL/hr at 02/24/20 1025    dextrose      heparin (porcine) 5 Units/kg/hr (02/25/20 0458)    norepinephrine 3 mcg/min (02/25/20 0548)     PRN Meds:.sodium chloride flush, acetaminophen, acetaminophen, polyethylene glycol, promethazine, ondansetron, glucose, dextrose, glucagon (rDNA), dextrose, heparin (porcine), heparin (porcine), bisacodyl     PHYSICAL EXAMINATION:  Patient Vitals for the past 8 hrs:   BP Temp Temp src Pulse Resp SpO2 Weight   02/25/20 0603 (!) 112/56 -- -- 70 18 95 % --   02/25/20 0541 (!) 102/50 -- -- 69 17 96 % --   02/25/20 0533 (!) 95/51 -- -- 72 21 96 % --   02/25/20 0503 99/62 -- -- 69 18 97 % --   02/25/20 0433 (!) 92/52 -- -- 70 16 98 % --   02/25/20 0403 95/74 98.6 °F (37 °C) Bladder 70 17 98 % --   02/25/20 0348 -- -- -- -- -- (!) 88 % --   02/25/20 0333 (!) 77/52 -- -- 72 19 92 % --   02/25/20 0303 (!) 106/49 -- -- 72 18 92 % 256 lb 2.8 oz (116.2 kg)   02/25/20 0233 115/61 -- -- 71 19 94 % --   02/25/20 0207 (!) 118/52 -- -- 78 17 96 % --   02/25/20 0133 (!) 157/69 -- -- 76 20 97 % --   02/25/20 0103 (!) 151/67 -- -- 83 25 97 % --   02/25/20 0033 (!) 157/88 -- -- 78 22 97 % --   02/25/20 0003 (!) 155/66 99.3 °F (37.4 °C) Bladder 75 21 96 % --   02/24/20 2333 121/70 -- -- 74 22 96 % --   02/24/20 2303 135/73 -- -- 73 21 98 % --   02/24/20 2233 118/61 -- -- 75 21 98 % --     I/O last 3 completed shifts: In: 7468.2 [I.V.:7468.2]  Out: 650 [Urine:650]   Wt Readings from Last 3 Encounters:   02/25/20 256 lb 2.8 oz (116.2 kg)   04/09/19 235 lb (106.6 kg)   10/08/18 232 lb (105.2 kg)      Body mass index is 45.38 kg/m². CAM-ICU:    General:   Morbidly obese elderly female resting in bed in no acute distress. Arousable and cooperative. HEENT:  normocephalic and atraumatic. No scleral icterus. PERR. Neck: supple. No thyromegaly.   Lungs:  Dminhsed right base/  143 145   K 5.4* 4.6 4.4    109 112*   CO2 22* 22* 22*   BUN 26* 26* 20   CREATININE 1.8* 1.5* 1.1   CALCIUM 8.4* 8.2* 8.1*     Recent Labs     02/23/20 1958   AST 37   ALT 44   BILIDIR <0.2   BILITOT 0.4   ALKPHOS 65     No results for input(s): INR in the last 72 hours. No results for input(s): Virgilio Sean in the last 72 hours. Recent Labs     02/24/20  0132   PROCAL 0.14*     Recent Labs     02/24/20  0612   LACTA 2.0      Microbiology:    Blood culture #1:   Lab Results   Component Value Date    BC No growth-preliminary  02/23/2020     Blood culture #2:No results found for: Nestor Watkins  Organism:  Lab Results   Component Value Date    ORG enteric gram negative bacilli 02/23/2020       No results found for: LABGRAM  MRSA culture only:No results found for: [facility]  Urine culture: No results found for: LABURIN  Respiratory culture: No results found for: CULTRESP  Aerobic and Anaerobic :  No results found for: LABAERO  No results found for: LABANAE    Urinalysis:      Lab Results   Component Value Date    NITRU NEGATIVE 02/23/2020    WBCUA 50-75 02/23/2020    WBCUA 0-5 05/12/2018    BACTERIA MODERATE 02/23/2020    RBCUA > 100 02/23/2020    BLOODU LARGE 02/23/2020    SPECGRAV 1.021 02/03/2018    GLUCOSEU 250 02/23/2020       Radiology:(All radiographs, tracings, PFTs, and imaging are personally viewed and interpreted unless otherwise noted). US GALLBLADDER RUQ   Final Result      No gallstones. Probable gallbladder wall thickening without wall edema or pericholecystic fluid. No biliary dilatation. Pancreatic body and tail obscured by bowel gas. **This report has been created using voice recognition software. It may contain minor errors which are inherent in voice recognition technology. **      Final report electronically signed by Dr. Aure Hernandez on 2/24/2020 6:19 AM      CT ABDOMEN PELVIS WO CONTRAST Additional Contrast? None   Final Result   Findings suspicious for acute although cannot exclude acute duodenitis. The stomach is unremarkable. The remainder of the small bowel and the colon are unremarkable. No bowel wall thickening or bowel obstruction. Appendix: appendix is visualized and appears normal without evidence of acute appendicitis. Omentum/mesentery: Unremarkable, no adenopathy or mass. Intraperitoneal/retroperitoneal Space: There is a very small amount of fluid in the pelvis and there is a small amount of fluid adjacent to the spleen. . No obvious abscess, pathologically enlarged lymph nodes, or mass. No pneumoperitoneum. Vascular: No abdominal aortic aneurysm. No significant vascular abnormality. Reproductive: The uterus is absent consistent with hysterectomy. The adnexal regions are unremarkable. Bladder: The bladder is empty due to the presence of a Martinez catheter. Body wall soft tissues: There is a small fat-containing umbilical hernia. . Musculoskeletal: There are degenerative changes of the thoracolumbar spine. There is no change in the slightly larger than 1 cm lytic lesion in the L5 vertebral body, stable dating back to the 2/8/2018 CT, likely a benign lesion, possibly a hemangioma. Findings suspicious for acute cholecystitis. Consider gallbladder ultrasound for further evaluation. No obvious biliary dilatation. Fluid adjacent to the second and proximal third portions of the duodenum which may be secondary to the suspected acute cholecystitis. Cannot exclude duodenitis. New 2 mm nonobstructing right upper pole renal calculus. Mild ascites adjacent to the spleen and in the pelvis. **This report has been created using voice recognition software. It may contain minor errors which are inherent in voice recognition technology. ** Final report electronically signed by Dr. Krishna Weiss on 2/24/2020 1:52 AM    Us Gallbladder Ruq    Result Date: 2/24/2020  PROCEDURE: US GALLBLADDER RUQ CLINICAL INFORMATION: acute cholecystitis, abdominal pain.  COMPARISON: Noncontrast abdomen pelvis CT earlier today TECHNIQUE:Real-time transabdominal ultrasound was performed. FINDINGS: Pancreas: The pancreatic head is normal. The body and tail were obscured by bowel gas. Liver: Normal echogenicity. No mass or intrahepatic biliary dilatation. Size: Grossly normal size Gallbladder: There are no gallstones or sludge. There is probable gallbladder wall thickening. No wall edema or pericholecystic fluid. Sonographic Ramos's Sign: Negative CBD: CBD diameter is normal measuring 2.4 mm. Right Kidney: Limited imaging of the right kidney demonstrates no hydronephrosis. Ascites: none     No gallstones. Probable gallbladder wall thickening without wall edema or pericholecystic fluid. No biliary dilatation. Pancreatic body and tail obscured by bowel gas. **This report has been created using voice recognition software. It may contain minor errors which are inherent in voice recognition technology. ** Final report electronically signed by Dr. Jes Bull on 2/24/2020 6:19 AM    Xr Chest Portable    Result Date: 2/24/2020  PROCEDURE: XR CHEST PORTABLE CLINICAL INFORMATION: central line plcmt. COMPARISON: Chest x-ray earlier today at 12:14 AM TECHNIQUE: AP Portable chest xray FINDINGS: Lines/tubes/devices: Interval placement of a right central venous catheter which may course in the distribution of the external jugular vein, tip in the upper/mid SVC region. The previously noted guidewire has been removed. Lungs/pleura: There is stable very mild central interstitial prominence, again either representing pulmonary fibrosis versus very mild interstitial pulmonary edema or atypical pneumonia. A subcentimeter calcified granuloma projects over the left lung base. There is no consolidation. No pleural effusion. No pneumothorax. Heart: There is stable mild cardiomegaly. Status post CABG surgery. Mediastinum/corinne: No obvious mass or adenopathy. Skeleton: No significant bone or joint abnormality.      Right-sided central venous catheter, tip in the region of the upper/mid SVC. No pneumothorax. Stable very mild central interstitial prominence, see above. **This report has been created using voice recognition software. It may contain minor errors which are inherent in voice recognition technology. ** Final report electronically signed by Dr. Arias Doherty on 2/24/2020 12:40 AM    Xr Chest Portable    Result Date: 2/24/2020  PROCEDURE: XR CHEST PORTABLE CLINICAL INFORMATION: central line attempted. COMPARISON: Chest x-ray dated 2/23/2020 TECHNIQUE: AP Portable chest xray FINDINGS: Limitations: Extrinsic artifact lies over the chest. Lines/tubes/devices: There is a wire projecting in the region of the right neck, extending over the right side of the mediastinum into the upper abdomen consistent with the central line wire placement as noted in the provided history. The wire courses in  the distribution of the right jugular vein, SVC, through the region of the right atrium into the region of the IVC. Distal tip is not imaged. Cannot confirm intravascular/intraluminal positioning on a chest x-ray. Lungs/pleura:  Interstitium is mildly prominent which may be chronic although cannot exclude mild pulmonary edema or an atypical pneumonia. There is no consolidation. No pleural effusion. No pneumothorax. Heart: There is stable mild cardiomegaly. Patient is status post sternotomy for CABG surgery. Mediastinum/corinne: No obvious mass or adenopathy. Skeleton: No significant bone or joint abnormality. Central line wire extending from the right neck into the medial right upper abdomen, see discussion above. No pneumothorax. Interstitial prominence, acute versus chronic. See discussion above and correlate clinically. Mild cardiomegaly status post CABG surgery. **This report has been created using voice recognition software. It may contain minor errors which are inherent in voice recognition technology. ** Final report electronically signed by nurse practitioner. Now off pressors but developing new onset atrial fibrillation. Trend cardiac enzymes. Screen for pulmonary embolism. At [facility] is at 1.1. Volume resuscitate and obtain CTA chest with PE protocol. Electronically signed by Jonathan To MD.

## 2020-02-25 NOTE — PLAN OF CARE
parameters  Description  Bowel elimination is within specified parameters  2/25/2020 0259 by Gomez Poncho  Outcome: Met This Shift  Note:   Pt with intermittent hypoactive/active bowel sounds this shift. Pt had large bowel movement this shift. Problem: Cardiac Output - Decreased:  Goal: Hemodynamic stability will improve  Description  Hemodynamic stability will improve  2/25/2020 0259 by Gomez Poncho  Outcome: Met This Shift  Note:   Pt weaned off of all blood pressure support medications at this time. Pt vital within normal limits. Problem: Fluid Volume - Imbalance:  Goal: Absence of imbalanced fluid volume signs and symptoms  Description  Absence of imbalanced fluid volume signs and symptoms  2/25/2020 0259 by Gomez Poncho  Outcome: Ongoing  Note:   Pt urinary output >30ml/hr at this time. Pt remains on 0.9 at 125ml/hr. Minimal edema to bilateral lower extremities. Will continue to monitor output and edema. Problem: Mental Status - Impaired:  Goal: Mental status will be restored to baseline  Description  Mental status will be restored to baseline  2/25/2020 0259 by Gomez Poncho  Outcome: Ongoing  Note:   Pt alert to name, year and that she is in a hospital. Pt with intermittent confusion and agitation. Pt  reports that she has Alzheimers, with confusion. Problem: Nutrition Deficit:  Goal: Ability to achieve adequate nutritional intake will improve  Description  Ability to achieve adequate nutritional intake will improve  2/25/2020 0259 by Gomez Poncho  Outcome: Not Met This Shift  Note:   Pt diet advanced to clear liquids at this time. Pt able to swallow pills and water without choking or coughing.       Problem: Serum Glucose Level - Abnormal:  Goal: Ability to maintain appropriate glucose levels will improve to within specified parameters  Description  Ability to maintain appropriate glucose levels will improve to within specified parameters  2/25/2020 0259 by

## 2020-02-25 NOTE — PROGRESS NOTES
[facility]    Renal inpatient Progress Note  2/25/2020 8:31 AM      Pt Name:   Abigail Nye:   1940  Attending:   Severa Solders, MD    Chief Complaint:   Parish Kraft is a 70 y.o. female being followed by nephrology for acute kidney injury     Interval History : patient seen and examined by me. She is awake and alert no distress denies any chest pain or shortness of breath  Making excellent urine output. Still on pressors at low-dose     Scheduled Medications :   [Held by provider] amLODIPine  10 mg Oral Daily    aspirin  81 mg Oral Daily    atorvastatin  40 mg Oral Daily    Vitamin D  2,000 Units Oral Daily    docusate sodium  100 mg Oral BID    [Held by provider] hydrALAZINE  50 mg Oral BID    magnesium oxide  400 mg Oral BID    sodium chloride flush  10 mL Intravenous 2 times per day    piperacillin-tazobactam  3.375 g Intravenous Q8H    insulin lispro  0-18 Units Subcutaneous Q4H    donepezil  5 mg Oral Nightly    sertraline  100 mg Oral Daily    vitamin C  500 mg Oral Daily    insulin glargine  15 Units Subcutaneous Nightly      lactated ringers      dextrose      heparin (porcine) 5 Units/kg/hr (02/25/20 1548)    norepinephrine 3 mcg/min (02/25/20 0532)        Vitals :  BP (!) 106/54   Pulse 67   Temp 98.6 °F (37 °C) (Bladder)   Resp 18   Ht 5' 3\" (1.6 m)   Wt 256 lb 2.8 oz (116.2 kg)   SpO2 92%   BMI 45.38 kg/m²     24HR INTAKE/OUTPUT:      Intake/Output Summary (Last 24 hours) at 2/25/2020 0831  Last data filed at 2/25/2020 0504  Gross per 24 hour   Intake 8633.23 ml   Output 1000 ml   Net 7633.23 ml     Last 3 weights  Wt Readings from Last 3 Encounters:   02/25/20 256 lb 2.8 oz (116.2 kg)   04/09/19 235 lb (106.6 kg)   10/08/18 232 lb (105.2 kg)        Physical Exam :  General Appearance:  Well developed.  No distress  Mouth/Throat:  Oral mucosa moist  Neck:  Supple, no JVD  Lungs:  Breath sounds: clear, diminished  Heart[de-identified]  S1,S2 heard  Abdomen:  Soft, non - tender  Musculoskeletal:  Edema -noted     Last 3 CBC  Recent Labs     02/23/20  1910 02/24/20  0612 02/25/20  0500   WBC 13.4* 12.0* 6.7   RBC 4.72 4.06* 3.59*   HGB 13.2 11.6* 10.0*   HCT 45.0 38.8 34.2*    147 114*     Last 3 CMP  Recent Labs     02/23/20  1958  02/24/20  0359 02/24/20  1130 02/25/20  0500      < > 141 143 145   K 6.3*   < > 5.4* 4.6 4.4      < > 107 109 112*   CO2 17*   < > 22* 22* 22*   BUN 24*   < > 26* 26* 20   CREATININE 1.4*   < > 1.8* 1.5* 1.1   CALCIUM 8.7   < > 8.4* 8.2* 8.1*   LABALBU 3.1*  --   --   --   --    BILITOT 0.4  --   --   --   --     < > = values in this interval not displayed. Assessment / Plan   Renal -acute kidney injury most likely secondary to acute tubular necrosis from hypotension/sepsis  · Currently started to make slightly more urine would expect the renal function to improve  · Continue IV hydration and follow BMP closely. Switch to hypotonic fluids as the sodium is rising  Hypotension on pressors wean pressors to map greater than 65 mm currently only on 3 mics of Levophed  Hyperkalemia secondary to DHAVAL and use of exogenous potassium supplements currently appears to be improved   Mild metabolic acidosis secondary to acute kidney injury improving  Septic shock may be related to urinary tract infection, on antibiotics  Change in mental status  Diabetes mellitus  History of left nephrectomy due to renal cancer in 2018  meds reviewed and D/W patient and nursing staff    EKATERINA Keane D.  [facility].

## 2020-02-26 LAB
ANION GAP SERPL CALCULATED.3IONS-SCNC: 10 MEQ/L (ref 8–16)
APTT: 54.5 SECONDS (ref 22–38)
APTT: 74.6 SECONDS (ref 22–38)
APTT: 87.1 SECONDS (ref 22–38)
BUN BLDV-MCNC: 11 MG/DL (ref 7–22)
CALCIUM SERPL-MCNC: 8 MG/DL (ref 8.5–10.5)
CHLORIDE BLD-SCNC: 109 MEQ/L (ref 98–111)
CO2: 23 MEQ/L (ref 23–33)
CREAT SERPL-MCNC: 0.7 MG/DL (ref 0.4–1.2)
ERYTHROCYTE [DISTWIDTH] IN BLOOD BY AUTOMATED COUNT: 14.5 % (ref 11.5–14.5)
ERYTHROCYTE [DISTWIDTH] IN BLOOD BY AUTOMATED COUNT: 14.5 % (ref 11.5–14.5)
ERYTHROCYTE [DISTWIDTH] IN BLOOD BY AUTOMATED COUNT: 49.7 FL (ref 35–45)
ERYTHROCYTE [DISTWIDTH] IN BLOOD BY AUTOMATED COUNT: 50.5 FL (ref 35–45)
GFR SERPL CREATININE-BSD FRML MDRD: 81 ML/MIN/1.73M2
GLUCOSE BLD-MCNC: 106 MG/DL (ref 70–108)
GLUCOSE BLD-MCNC: 123 MG/DL (ref 70–108)
GLUCOSE BLD-MCNC: 142 MG/DL (ref 70–108)
GLUCOSE BLD-MCNC: 143 MG/DL (ref 70–108)
GLUCOSE BLD-MCNC: 163 MG/DL (ref 70–108)
GLUCOSE BLD-MCNC: 195 MG/DL (ref 70–108)
HCT VFR BLD CALC: 32.8 % (ref 37–47)
HCT VFR BLD CALC: 34.1 % (ref 37–47)
HEMOGLOBIN: 10.1 GM/DL (ref 12–16)
HEMOGLOBIN: 9.7 GM/DL (ref 12–16)
MAGNESIUM: 1.7 MG/DL (ref 1.6–2.4)
MCH RBC QN AUTO: 27.9 PG (ref 26–33)
MCH RBC QN AUTO: 28 PG (ref 26–33)
MCHC RBC AUTO-ENTMCNC: 29.6 GM/DL (ref 32.2–35.5)
MCHC RBC AUTO-ENTMCNC: 29.6 GM/DL (ref 32.2–35.5)
MCV RBC AUTO: 94.3 FL (ref 81–99)
MCV RBC AUTO: 94.5 FL (ref 81–99)
MRSA SCREEN: NORMAL
PLATELET # BLD: 113 THOU/MM3 (ref 130–400)
PLATELET # BLD: 130 THOU/MM3 (ref 130–400)
PMV BLD AUTO: 10.9 FL (ref 9.4–12.4)
PMV BLD AUTO: 11.1 FL (ref 9.4–12.4)
POTASSIUM SERPL-SCNC: 3.9 MEQ/L (ref 3.5–5.2)
RBC # BLD: 3.48 MILL/MM3 (ref 4.2–5.4)
RBC # BLD: 3.61 MILL/MM3 (ref 4.2–5.4)
SODIUM BLD-SCNC: 142 MEQ/L (ref 135–145)
WBC # BLD: 5.9 THOU/MM3 (ref 4.8–10.8)
WBC # BLD: 7 THOU/MM3 (ref 4.8–10.8)

## 2020-02-26 PROCEDURE — 80048 BASIC METABOLIC PNL TOTAL CA: CPT

## 2020-02-26 PROCEDURE — 6360000002 HC RX W HCPCS: Performed by: NURSE PRACTITIONER

## 2020-02-26 PROCEDURE — 99232 SBSQ HOSP IP/OBS MODERATE 35: CPT | Performed by: NURSE PRACTITIONER

## 2020-02-26 PROCEDURE — 36592 COLLECT BLOOD FROM PICC: CPT

## 2020-02-26 PROCEDURE — 2580000003 HC RX 258: Performed by: NURSE PRACTITIONER

## 2020-02-26 PROCEDURE — 99232 SBSQ HOSP IP/OBS MODERATE 35: CPT | Performed by: INTERNAL MEDICINE

## 2020-02-26 PROCEDURE — 2580000003 HC RX 258: Performed by: INTERNAL MEDICINE

## 2020-02-26 PROCEDURE — 6370000000 HC RX 637 (ALT 250 FOR IP): Performed by: NURSE PRACTITIONER

## 2020-02-26 PROCEDURE — 94640 AIRWAY INHALATION TREATMENT: CPT

## 2020-02-26 PROCEDURE — 2709999900 HC NON-CHARGEABLE SUPPLY

## 2020-02-26 PROCEDURE — 94761 N-INVAS EAR/PLS OXIMETRY MLT: CPT

## 2020-02-26 PROCEDURE — 6360000002 HC RX W HCPCS: Performed by: INTERNAL MEDICINE

## 2020-02-26 PROCEDURE — 83735 ASSAY OF MAGNESIUM: CPT

## 2020-02-26 PROCEDURE — 2700000000 HC OXYGEN THERAPY PER DAY

## 2020-02-26 PROCEDURE — 36415 COLL VENOUS BLD VENIPUNCTURE: CPT

## 2020-02-26 PROCEDURE — 85730 THROMBOPLASTIN TIME PARTIAL: CPT

## 2020-02-26 PROCEDURE — 85027 COMPLETE CBC AUTOMATED: CPT

## 2020-02-26 PROCEDURE — 2060000000 HC ICU INTERMEDIATE R&B

## 2020-02-26 PROCEDURE — 82948 REAGENT STRIP/BLOOD GLUCOSE: CPT

## 2020-02-26 RX ORDER — METOPROLOL SUCCINATE 25 MG/1
25 TABLET, EXTENDED RELEASE ORAL DAILY
Status: DISCONTINUED | OUTPATIENT
Start: 2020-02-26 | End: 2020-02-28 | Stop reason: HOSPADM

## 2020-02-26 RX ORDER — ALBUTEROL SULFATE 2.5 MG/3ML
2.5 SOLUTION RESPIRATORY (INHALATION) EVERY 6 HOURS
Status: ACTIVE | OUTPATIENT
Start: 2020-02-26 | End: 2020-02-27

## 2020-02-26 RX ADMIN — INSULIN LISPRO 3 UNITS: 100 INJECTION, SOLUTION INTRAVENOUS; SUBCUTANEOUS at 21:02

## 2020-02-26 RX ADMIN — INSULIN LISPRO 3 UNITS: 100 INJECTION, SOLUTION INTRAVENOUS; SUBCUTANEOUS at 18:13

## 2020-02-26 RX ADMIN — INSULIN LISPRO 3 UNITS: 100 INJECTION, SOLUTION INTRAVENOUS; SUBCUTANEOUS at 08:38

## 2020-02-26 RX ADMIN — INSULIN GLARGINE 15 UNITS: 100 INJECTION, SOLUTION SUBCUTANEOUS at 21:02

## 2020-02-26 RX ADMIN — DONEPEZIL HYDROCHLORIDE 5 MG: 5 TABLET, FILM COATED ORAL at 21:25

## 2020-02-26 RX ADMIN — OXYCODONE HYDROCHLORIDE AND ACETAMINOPHEN 500 MG: 500 TABLET ORAL at 09:55

## 2020-02-26 RX ADMIN — MAGNESIUM GLUCONATE 500 MG ORAL TABLET 400 MG: 500 TABLET ORAL at 21:25

## 2020-02-26 RX ADMIN — VITAMIN D, TAB 1000IU (100/BT) 2000 UNITS: 25 TAB at 09:55

## 2020-02-26 RX ADMIN — INSULIN LISPRO 3 UNITS: 100 INJECTION, SOLUTION INTRAVENOUS; SUBCUTANEOUS at 12:30

## 2020-02-26 RX ADMIN — APIXABAN 5 MG: 5 TABLET, FILM COATED ORAL at 11:45

## 2020-02-26 RX ADMIN — DOCUSATE SODIUM 100 MG: 100 CAPSULE, LIQUID FILLED ORAL at 08:38

## 2020-02-26 RX ADMIN — DOCUSATE SODIUM 100 MG: 100 CAPSULE, LIQUID FILLED ORAL at 20:52

## 2020-02-26 RX ADMIN — CEFTRIAXONE SODIUM 1 G: 1 INJECTION, POWDER, FOR SOLUTION INTRAMUSCULAR; INTRAVENOUS at 20:52

## 2020-02-26 RX ADMIN — APIXABAN 5 MG: 5 TABLET, FILM COATED ORAL at 20:52

## 2020-02-26 RX ADMIN — Medication 10 ML: at 08:52

## 2020-02-26 RX ADMIN — MAGNESIUM GLUCONATE 500 MG ORAL TABLET 400 MG: 500 TABLET ORAL at 09:55

## 2020-02-26 RX ADMIN — METOPROLOL SUCCINATE 25 MG: 25 TABLET, FILM COATED, EXTENDED RELEASE ORAL at 16:29

## 2020-02-26 RX ADMIN — ALBUTEROL SULFATE 2.5 MG: 2.5 SOLUTION RESPIRATORY (INHALATION) at 16:07

## 2020-02-26 RX ADMIN — ALBUTEROL SULFATE 2.5 MG: 2.5 SOLUTION RESPIRATORY (INHALATION) at 21:34

## 2020-02-26 RX ADMIN — ASPIRIN 81 MG: 81 TABLET ORAL at 08:38

## 2020-02-26 RX ADMIN — ATORVASTATIN CALCIUM 40 MG: 40 TABLET, FILM COATED ORAL at 08:38

## 2020-02-26 RX ADMIN — Medication 10 ML: at 20:52

## 2020-02-26 RX ADMIN — SERTRALINE 100 MG: 100 TABLET, FILM COATED ORAL at 09:55

## 2020-02-26 ASSESSMENT — PAIN SCALES - GENERAL
PAINLEVEL_OUTOF10: 0

## 2020-02-26 NOTE — PLAN OF CARE
Braxton More is a 70year old female-CTA Chest 2/25/2020 Positive for PE-Heparin High Dose started. .   Patient to be transferred to [facility]. Hand off given to [employer] PA.

## 2020-02-26 NOTE — PROGRESS NOTES
Patient is alert and oriented to place and time; disoriented to situation. Speech is clear, but slow. Upper extremities pink, warm, and dry. Hand  weak bilaterally, slight numbness reported in hands. Primary RN notified. Capillary refill is less than 3 seconds bilaterally. Radial pulses +1 bilaterally. Respirations are normal, dyspneic with exertion. Lung sounds clear with diminished sounds in the lower lobes bilaterally. Patient is on 2L O2 via nasal cannula. Heart sounds normal, with regular rhythm and rate. Abdomen soft and rounded, with no distention and some tenderness in the RUQ. Bowel sounds active x4. Urinary catheter in place. Site is clean and dry with normal output. Lower extremities pink, warm and dry bilaterally with +2 pitting edema bilaterally. Post tibial and Pedal pulses are +1 bilaterally. Pedal push/pull weak bilaterally. Patient is incontinent with bowel, has regular bowel movements. Patient has central line in place in right internal jugular, and 3 peripheral IV's in place, all currently capped.

## 2020-02-26 NOTE — PROGRESS NOTES
Report received from primary night shift nurse.     Electronically signed by Gerhardt Balint SRC/ on 2/26/2020 at 9:38 AM

## 2020-02-26 NOTE — PROGRESS NOTES
Care turned over to primary RN.      Electronically signed by Claudia ASHLEY/ on 2/26/2020 at 12:08 PM

## 2020-02-26 NOTE — PROGRESS NOTES
[facility]    Renal inpatient Progress Note  2/26/2020 7:43 AM      Pt Name:   Mahnaz Bis:   1940  Attending:   NEELIMA Langley CNP    Chief Complaint:   Lainey Barrios is a 70 y.o. female being followed by nephrology for acute kidney injury     Interval History : patient seen and examined by me. She is awake and alert no distress denies any chest pain or shortness of breath  Making decent urine output     Scheduled Medications :   cefTRIAXone (ROCEPHIN) IV  1 g Intravenous Q24H    metoprolol  2.5 mg Intravenous 3 times per day    [Held by provider] amLODIPine  10 mg Oral Daily    aspirin  81 mg Oral Daily    atorvastatin  40 mg Oral Daily    Vitamin D  2,000 Units Oral Daily    docusate sodium  100 mg Oral BID    [Held by provider] hydrALAZINE  50 mg Oral BID    magnesium oxide  400 mg Oral BID    sodium chloride flush  10 mL Intravenous 2 times per day    insulin lispro  0-18 Units Subcutaneous Q4H    donepezil  5 mg Oral Nightly    sertraline  100 mg Oral Daily    vitamin C  500 mg Oral Daily    insulin glargine  15 Units Subcutaneous Nightly      heparin (porcine) 11 Units/kg/hr (02/26/20 0107)    dextrose      norepinephrine Stopped (02/25/20 1116)        Vitals :  /69   Pulse 70   Temp 97.4 °F (36.3 °C) (Oral)   Resp 16   Ht 5' 3\" (1.6 m)   Wt 256 lb 2.8 oz (116.2 kg)   SpO2 94%   BMI 45.38 kg/m²     24HR INTAKE/OUTPUT:      Intake/Output Summary (Last 24 hours) at 2/26/2020 0743  Last data filed at 2/26/2020 0400  Gross per 24 hour   Intake 3698.68 ml   Output 1100 ml   Net 2598.68 ml     Last 3 weights  Wt Readings from Last 3 Encounters:   02/25/20 256 lb 2.8 oz (116.2 kg)   04/09/19 235 lb (106.6 kg)   10/08/18 232 lb (105.2 kg)        Physical Exam :  General Appearance:  Well developed.  No distress  Mouth/Throat:  Oral mucosa moist  Neck:  Supple, no JVD  Lungs:  Breath sounds: clear, diminished  Heart[de-identified]  S1,S2

## 2020-02-26 NOTE — PLAN OF CARE
low airloss, alternating pressure relief mattress. 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 Problem: Infection - Central Venous Catheter-Associated Bloodstream Infection:  Goal: Will show no infection signs and symptoms  Description  Will show no infection signs and symptoms  Outcome: Ongoing  Note:   Dressing intact, biopatch dry. Care plan reviewed with patient and family. Patient and family verbalize understanding of the plan of care and contribute to goal setting.

## 2020-02-26 NOTE — CONSULTS
[facility]  Cardiology Consult        Patient:  Barron Michelle  YOB: 1940  MRN: [id number]     Acct: [de-identified]    PCP: Andrzej Murillo MD    Date of Admission: 2/23/2020      Reason for Consultation:  Afib      History Of Present Illness:    70 y.o.  female who is admitted to the ICU on 2/23/20 with fevers, hypotension and nausea/vomiting. Patient has hx of CABG 1995, HTN, DM, HLD, DVT, Kidney cancer s/p left nephrectomy 2018. Patient is poor historian and all of the history is obtained from EMR. Patient was initially started on levophed. While in the hospital patient was noted to be in AFib with RVR, while on the pressors. Cardiology was consulted for management of Afib. Echo from 2/24/20 showed EF 50%, mod TR. Past Medical History:          Diagnosis Date    Arthritis     CAD (coronary artery disease)     s/p MI, CABG 2 vessels 1994    CKD stage 2 due to type 2 diabetes mellitus (HCC)     secondary to obesity, aging, and DM    DM (diabetes mellitus) ([address]     DM retinopathy ([address]     mild    HTN (hypertension)     variable, improved by chaning her medication raya.  Hyperlipidemia 1994    Morbid obesity ([address]     OAB (overactive bladder)     chronic episodes of incontinence per     UTI (urinary tract infection) 07/2017    preop       Past Surgical History:          Procedure Laterality Date    CORONARY ARTERY BYPASS GRAFT  1995    s/p two-vessel Wilbert    HYSTERECTOMY  1994    KNEE ARTHROSCOPY Right ? Dr. Alonso Albrecht, RADICAL NEPHRECTOMY Left 3/21/2018    ROBOT ASSISTED LAPAROSCOPIC LEFT RADICAL NEPHRECTOMY performed by Olman Badillo MD at [facility] Right     TUMOR REMOVAL  1994    ovarian       Medications Prior to Admission:      Prior to Admission medications    Medication Sig Start Date End Date Taking?  Authorizing Provider   donepezil (ARICEPT) 5 MG tablet Take 5 mg by mouth nightly   Yes Historical Provider, MD   insulin detemir (LEVEMIR) 100 UNIT/ML injection vial Inject 35 Units into the skin nightly   Yes Historical Provider, MD   polyethylene glycol (GLYCOLAX) packet Take 17 g by mouth daily   Yes Historical Provider, MD   bisacodyl (DULCOLAX) 10 MG suppository Place 10 mg rectally daily as needed for Constipation   Yes Historical Provider, MD   vitamin C (ASCORBIC ACID) 500 MG tablet Take 500 mg by mouth daily   Yes Historical Provider, MD   sertraline (ZOLOFT) 100 MG tablet Take 1 tablet by mouth daily 8/3/18  Yes Consuello Sessions, MD   hydrochlorothiazide (HYDRODIURIL) 25 MG tablet Take 25 mg by mouth daily   Yes Historical Provider, MD   lisinopril (PRINIVIL;ZESTRIL) 40 MG tablet Take 40 mg by mouth daily   Yes Historical Provider, MD   metoprolol succinate (TOPROL XL) 50 MG extended release tablet Take 50 mg by mouth daily   Yes Historical Provider, MD   docusate sodium (COLACE) 100 MG capsule Take 100 mg by mouth 2 times daily   Yes Historical Provider, MD   aspirin 81 MG EC tablet Take 1 tablet by mouth daily 3/27/18  Yes Manuela Huff APRN - CNP   Cholecalciferol (VITAMIN D3) 2000 units TABS Take 2,000 Units by mouth daily    Yes Historical Provider, MD   hydrALAZINE (APRESOLINE) 50 MG tablet Take 1 tablet by mouth 2 times daily 3/8/18  Yes Consuelcamron Solomon, MD   Magnesium 400 MG CAPS Take 1 tablet by mouth 2 times daily 3/8/18  Yes Consuelcamron Sessions, MD   methenamine (HIPREX) 1 g tablet Take 1 tablet by mouth 2 times daily (with meals) 1/31/18  Yes Ericka Adkins APRN - CNP   solifenacin (VESICARE) 10 MG tablet Take 1 tablet by mouth daily 11/10/17  Yes Consuello Sessions, MD   atorvastatin (LIPITOR) 40 MG tablet Take 1 tablet by mouth daily 10/21/17  Yes Consuello Neha, MD   metFORMIN (GLUCOPHAGE) 500 MG tablet Take 1 tablet by mouth 2 times daily (with meals) 9/19/17  Yes Consuello Neha, MD   amLODIPine (NORVASC) 10 MG tablet Take 1 tablet by mouth daily 9/19/17  Yes [facility] Leana Abdalla MD   Insulin Aspart (NOVOLOG SC) Inject into the skin 4 times daily (before meals and nightly)     Historical Provider, MD   magnesium hydroxide (MILK OF MAGNESIA CONCENTRATE) 2400 MG/10ML SUSP Take 2,400 mg by mouth once as needed    Historical Provider, MD   Dulaglutide (TRULICITY) 0.26 GB/7.7KC SOPN Inject 0.75 mg into the skin once a week Takes on Mon 10/4/18   Collette Barrett, MD   blood glucose monitor strips Test three times daily.   One Touch ultra test strips 7/23/18   Collette Barrett, MD   potassium chloride (KLOR-CON M) 20 MEQ extended release tablet Take 1 tablet by mouth daily 3/8/18   Collette Barrett, MD   acetaminophen (TYLENOL) 500 MG tablet Take 500 mg by mouth every 6 hours as needed for Pain    Historical Provider, MD       Current Facility-Administered Medications   Medication Dose Route Frequency Provider Last Rate Last Dose    cefTRIAXone (ROCEPHIN) 1 g IVPB in 50 mL D5W minibag  1 g Intravenous Q24H Austin Mulligan MD   Stopped at 02/25/20 2149    metoprolol (LOPRESSOR) injection 2.5 mg  2.5 mg Intravenous 3 times per day Gomez Spruce, APRN - CNP   2.5 mg at 02/25/20 1512    0.45 % sodium chloride infusion   Intravenous Continuous Gomez Spruce, APRN - CNP 75 mL/hr at 02/25/20 2021      heparin (porcine) injection 9,300 Units  80 Units/kg Intravenous PRN Magaly Ness Ala, APRN - CNP        heparin (porcine) injection 4,650 Units  40 Units/kg Intravenous PRN Magaly Ness Ala, APRN - CNP        heparin 25,000 units in dextrose 5% 250 mL infusion  18 Units/kg/hr Intravenous Continuous Magaly Ness Ala, APRN - CNP 10.5 mL/hr at 02/25/20 1946 9 Units/kg/hr at 02/25/20 1946    [Held by provider] amLODIPine (NORVASC) tablet 10 mg  10 mg Oral Daily Magaly Ness Ala, APRN - CNP        aspirin EC tablet 81 mg  81 mg Oral Daily Magaly Ness Ala APRN - CNP   81 mg at 02/25/20 0856    atorvastatin (LIPITOR) tablet 40 mg  40 mg Oral Daily NEELIMA Meek CNP   40 mg at 02/25/20 1500    Vitamin D (CHOLECALCIFEROL) tablet 2,000 Units  2,000 Units Oral Daily NEELIMA Meek CNP   2,000 Units at 02/25/20 0856    docusate sodium (COLACE) capsule 100 mg  100 mg Oral BID NEELIMA Meek CNP   100 mg at 02/25/20 2106    [Held by provider] hydrALAZINE (APRESOLINE) tablet 50 mg  50 mg Oral BID NEELIMA Meek CNP        magnesium oxide (MAG-OX) tablet 400 mg  400 mg Oral BID NEELIMA Meek CNP   400 mg at 02/25/20 2106    sodium chloride flush 0.9 % injection 10 mL  10 mL Intravenous 2 times per day NEELIMA Meek CNP   10 mL at 02/25/20 2110    sodium chloride flush 0.9 % injection 10 mL  10 mL Intravenous PRN NEELIMA Meek CNP        acetaminophen (TYLENOL) tablet 650 mg  650 mg Oral Q6H PRN NEELIMA Meek - JOSE ANTONIO        acetaminophen (TYLENOL) suppository 650 mg  650 mg Rectal Q6H PRN NEELIMA Meek CNP        polyethylene glycol (GLYCOLAX) packet 17 g  17 g Oral Daily PRN NEELIMA Meek CNP        promethazine (PHENERGAN) tablet 12.5 mg  12.5 mg Oral Q6H PRN NEELIMA Meek CNP        ondansetron (ZOFRAN) injection 4 mg  4 mg Intravenous Q6H PRN NEELIMA Meek - CNP        glucose (GLUTOSE) 40 % oral gel 15 g  15 g Oral PRN NEELIMA Meek CNP        dextrose 50 % IV solution  12.5 g Intravenous PRN NEELIMA Meek - CNP        glucagon (rDNA) injection 1 mg  1 mg Intramuscular PRN NEELIMA Meek CNP        dextrose 5 % solution  100 mL/hr Intravenous PRN NEELIMA Meek CNP        insulin lispro (HUMALOG) injection vial 0-18 Units  0-18 Units Subcutaneous Q4H Bello Rodriguez FARHAN ChavesN - CNP   3 Units at 02/25/20 1701    bisacodyl (DULCOLAX) suppository 10 mg  10 mg Rectal Daily PRN Brionnaalee [place], APRN - CNP        donepezil (ARICEPT) tablet 5 mg  5 mg Oral Nightly Pamalee Quincy, APRN - CNP   5 mg at 02/25/20 2106    sertraline (ZOLOFT) tablet 100 mg  100 mg Oral Daily Brionnaalee [place], APRN - CNP   100 mg at 02/25/20 0938    vitamin C (ASCORBIC ACID) tablet 500 mg  500 mg Oral Daily Pamalee [place], APRN - CNP   500 mg at 02/25/20 0856    insulin glargine (LANTUS) injection vial 15 Units  15 Units Subcutaneous Nightly Pamalee [place], APRN - CNP   15 Units at 02/24/20 2101    norepinephrine (LEVOPHED) 16 mg in dextrose 5% 250 mL infusion  10 mcg/min Intravenous Continuous Wei Truong MD   Stopped at 02/25/20 1116       Allergies:  Patient has no known allergies. Social History:    TOBACCO:   reports that she has never smoked. She has never used smokeless tobacco.  ETOH:   reports no history of alcohol use. Family History:        Problem Relation Age of Onset    Arthritis Mother     Coronary Art Dis Mother     Diabetes Mother     High Blood Pressure Mother     Other Mother         TIA's    Cancer Father     Stroke Father     Cancer Paternal Grandmother          Review of Systems -   General ROS: negative  Psychological ROS: negative  Hematological and Lymphatic ROS: No history of blood clots or bleeding disorder. Respiratory ROS: no cough, shortness of breath, or wheezing  Cardiovascular ROS: As per HPI  Gastrointestinal ROS: negative  Genito-Urinary ROS: no dysuria, trouble voiding, or hematuria  Musculoskeletal ROS: negative  Neurological ROS: no TIA or stroke symptoms  Dermatological ROS: negative    All others reviewed and are negative.        BP (!) 143/67   Pulse 72   Temp 98.3 °F (36.8 °C) (Oral)   Resp 21   Ht 5' 3\" (1.6 m)   Wt 256 lb 2.8 oz (116.2 kg)   SpO2 97%   BMI 45.38 kg/m²       Physical Examination:   General appearance - alert, in no distress  Mental status - alert, oriented to person, place, and time  Neck - supple, no significant adenopathy, no JVD, or carotid bruits  Chest - clear to auscultation, no wheezes, rales or rhonchi, symmetric air entry  Heart - normal rate, irregular rhythm, normal S1, S2, no murmurs, rubs, clicks or gallops  Abdomen - soft, nontender, nondistended, no masses or organomegaly  Neurological - alert, oriented, normal speech, no focal findings or movement disorder noted  Musculoskeletal - no joint tenderness, deformity or swelling  Extremities - peripheral pulses normal, no pedal edema, no clubbing or cyanosis  Skin - normal coloration and turgor, no rashes, no suspicious skin lesions noted      LABS:    Recent Labs     02/24/20  1700 02/24/20  2040 02/25/20  1350   TROPONINT 0.352* 0.407* 0.286*     CBC:   Lab Results   Component Value Date    WBC 6.7 02/25/2020    RBC 3.59 02/25/2020    HGB 10.0 02/25/2020    HCT 34.2 02/25/2020    MCV 95.3 02/25/2020    MCH 27.9 02/25/2020    MCHC 29.2 02/25/2020    RDW 14.5 09/19/2019     02/25/2020    MPV 11.2 02/25/2020     BMP:    Lab Results   Component Value Date     02/25/2020    K 4.3 02/25/2020    K 6.3 02/23/2020     02/25/2020    CO2 22 02/25/2020    BUN 20 02/25/2020    LABALBU 3.1 02/23/2020    CREATININE 1.1 02/25/2020    CALCIUM 8.1 02/25/2020    LABGLOM 48 02/25/2020    GLUCOSE 135 02/25/2020    GLUCOSE 134 05/12/2018     Hepatic Function Panel:    Lab Results   Component Value Date    ALKPHOS 65 02/23/2020    ALT 44 02/23/2020    AST 37 02/23/2020    PROT 5.8 02/23/2020    BILITOT 0.4 02/23/2020    BILIDIR <0.2 02/23/2020    LABALBU 3.1 02/23/2020     Magnesium:    Lab Results   Component Value Date    MG 1.6 02/25/2020     Warfarin PT/INR:  No components found for: PTPATWAR, PTINRWAR  HgBA1c:    Lab Results   Component Value Date    LABA1C 6.0 09/19/2019     FLP:    Lab Results   Component Value Date    TRIG 114 09/19/2019    HDL 31 09/19/2019    LDLCALC 60 09/19/2019    LDLDIRECT 70.13 12/14/2017     TSH:  No results found for: TSH  BNP: No components found for: PRO-BNP      Assessment/Plan:    Patient Active Problem List   Diagnosis    HTN (hypertension)    CAD (coronary artery disease)    Arthritis    DM (diabetes mellitus) ([facility]    CKD stage 2 due to type 2 diabetes mellitus ([facility]    Hyperlipidemia    Morbid obesity ([facility]    Major depressive disorder with single episode, in partial remission ([facility]    Left kidney mass    Atrophic kidney    Staghorn calculus    Encephalopathy acute with prob CVA vs Toxic/metabolic encephalopathy    S/P left nephrectomy     Alzheimer's dementia without behavioral disturbance ([facility]    Septic shock (HCC)     Afib, new onset  Septic shock 2/2 UTI? CABG  Left nephrectomy  Elevated troponins    Currently patient denies cardiac symptoms  Afib, rate controlled  [id number], high risk  On IV heparin, Needs to be switch to [address] [place] [address] Road on discharge  Will consider cardioversion when clinically improved  Further recs based on clinical course      Please do note hesitate to contact me for any further questions. Thank you for the opportunity to be involved in this patient's care.     Code Status: Limited    Electronically signed by Kimberly John MD on 2/25/2020 at 10:13 PM

## 2020-02-26 NOTE — PROGRESS NOTES
Cleaned pt. up from bowel movement with primary RN. Provided elisabeth care with soap and water and provided garibay care. Replaced bedding for pt.; tolerated well.

## 2020-02-27 LAB
ANION GAP SERPL CALCULATED.3IONS-SCNC: 13 MEQ/L (ref 8–16)
BUN BLDV-MCNC: 9 MG/DL (ref 7–22)
CALCIUM SERPL-MCNC: 8.8 MG/DL (ref 8.5–10.5)
CHLORIDE BLD-SCNC: 107 MEQ/L (ref 98–111)
CO2: 24 MEQ/L (ref 23–33)
CREAT SERPL-MCNC: 0.7 MG/DL (ref 0.4–1.2)
ERYTHROCYTE [DISTWIDTH] IN BLOOD BY AUTOMATED COUNT: 14.3 % (ref 11.5–14.5)
ERYTHROCYTE [DISTWIDTH] IN BLOOD BY AUTOMATED COUNT: 48.8 FL (ref 35–45)
GFR SERPL CREATININE-BSD FRML MDRD: 81 ML/MIN/1.73M2
GLUCOSE BLD-MCNC: 117 MG/DL (ref 70–108)
GLUCOSE BLD-MCNC: 162 MG/DL (ref 70–108)
GLUCOSE BLD-MCNC: 177 MG/DL (ref 70–108)
GLUCOSE BLD-MCNC: 178 MG/DL (ref 70–108)
GLUCOSE BLD-MCNC: 206 MG/DL (ref 70–108)
GLUCOSE BLD-MCNC: 248 MG/DL (ref 70–108)
HCT VFR BLD CALC: 34.4 % (ref 37–47)
HEMOGLOBIN: 10.4 GM/DL (ref 12–16)
MCH RBC QN AUTO: 28.3 PG (ref 26–33)
MCHC RBC AUTO-ENTMCNC: 30.2 GM/DL (ref 32.2–35.5)
MCV RBC AUTO: 93.5 FL (ref 81–99)
ORGANISM: ABNORMAL
PLATELET # BLD: 127 THOU/MM3 (ref 130–400)
PMV BLD AUTO: 11.8 FL (ref 9.4–12.4)
POTASSIUM SERPL-SCNC: 3.7 MEQ/L (ref 3.5–5.2)
RBC # BLD: 3.68 MILL/MM3 (ref 4.2–5.4)
SODIUM BLD-SCNC: 144 MEQ/L (ref 135–145)
URINE CULTURE, ROUTINE: ABNORMAL
WBC # BLD: 5.4 THOU/MM3 (ref 4.8–10.8)

## 2020-02-27 PROCEDURE — 6370000000 HC RX 637 (ALT 250 FOR IP): Performed by: NURSE PRACTITIONER

## 2020-02-27 PROCEDURE — 2500000003 HC RX 250 WO HCPCS: Performed by: INTERNAL MEDICINE

## 2020-02-27 PROCEDURE — 99232 SBSQ HOSP IP/OBS MODERATE 35: CPT | Performed by: INTERNAL MEDICINE

## 2020-02-27 PROCEDURE — 2700000000 HC OXYGEN THERAPY PER DAY

## 2020-02-27 PROCEDURE — 82948 REAGENT STRIP/BLOOD GLUCOSE: CPT

## 2020-02-27 PROCEDURE — 2709999900 HC NON-CHARGEABLE SUPPLY

## 2020-02-27 PROCEDURE — 6360000002 HC RX W HCPCS: Performed by: INTERNAL MEDICINE

## 2020-02-27 PROCEDURE — 2580000003 HC RX 258: Performed by: INTERNAL MEDICINE

## 2020-02-27 PROCEDURE — 94640 AIRWAY INHALATION TREATMENT: CPT

## 2020-02-27 PROCEDURE — 94761 N-INVAS EAR/PLS OXIMETRY MLT: CPT

## 2020-02-27 PROCEDURE — 99232 SBSQ HOSP IP/OBS MODERATE 35: CPT | Performed by: NURSE PRACTITIONER

## 2020-02-27 PROCEDURE — 85027 COMPLETE CBC AUTOMATED: CPT

## 2020-02-27 PROCEDURE — 80048 BASIC METABOLIC PNL TOTAL CA: CPT

## 2020-02-27 PROCEDURE — 36415 COLL VENOUS BLD VENIPUNCTURE: CPT

## 2020-02-27 PROCEDURE — 36592 COLLECT BLOOD FROM PICC: CPT

## 2020-02-27 PROCEDURE — 2580000003 HC RX 258: Performed by: NURSE PRACTITIONER

## 2020-02-27 PROCEDURE — 6360000002 HC RX W HCPCS: Performed by: NURSE PRACTITIONER

## 2020-02-27 PROCEDURE — 2060000000 HC ICU INTERMEDIATE R&B

## 2020-02-27 RX ORDER — BUMETANIDE 0.25 MG/ML
1 INJECTION, SOLUTION INTRAMUSCULAR; INTRAVENOUS ONCE
Status: COMPLETED | OUTPATIENT
Start: 2020-02-27 | End: 2020-02-27

## 2020-02-27 RX ADMIN — OXYCODONE HYDROCHLORIDE AND ACETAMINOPHEN 500 MG: 500 TABLET ORAL at 08:40

## 2020-02-27 RX ADMIN — Medication 10 ML: at 08:44

## 2020-02-27 RX ADMIN — VITAMIN D, TAB 1000IU (100/BT) 2000 UNITS: 25 TAB at 08:40

## 2020-02-27 RX ADMIN — CEFTRIAXONE SODIUM 1 G: 1 INJECTION, POWDER, FOR SOLUTION INTRAMUSCULAR; INTRAVENOUS at 20:30

## 2020-02-27 RX ADMIN — ALBUTEROL SULFATE 2.5 MG: 2.5 SOLUTION RESPIRATORY (INHALATION) at 12:23

## 2020-02-27 RX ADMIN — INSULIN GLARGINE 15 UNITS: 100 INJECTION, SOLUTION SUBCUTANEOUS at 20:30

## 2020-02-27 RX ADMIN — DOCUSATE SODIUM 100 MG: 100 CAPSULE, LIQUID FILLED ORAL at 08:41

## 2020-02-27 RX ADMIN — MAGNESIUM GLUCONATE 500 MG ORAL TABLET 400 MG: 500 TABLET ORAL at 20:26

## 2020-02-27 RX ADMIN — METOPROLOL SUCCINATE 25 MG: 25 TABLET, FILM COATED, EXTENDED RELEASE ORAL at 08:40

## 2020-02-27 RX ADMIN — INSULIN LISPRO 3 UNITS: 100 INJECTION, SOLUTION INTRAVENOUS; SUBCUTANEOUS at 20:30

## 2020-02-27 RX ADMIN — MAGNESIUM GLUCONATE 500 MG ORAL TABLET 400 MG: 500 TABLET ORAL at 08:41

## 2020-02-27 RX ADMIN — BUMETANIDE 1 MG: 0.25 INJECTION INTRAMUSCULAR; INTRAVENOUS at 08:48

## 2020-02-27 RX ADMIN — DOCUSATE SODIUM 100 MG: 100 CAPSULE, LIQUID FILLED ORAL at 20:26

## 2020-02-27 RX ADMIN — ASPIRIN 81 MG: 81 TABLET ORAL at 08:41

## 2020-02-27 RX ADMIN — APIXABAN 5 MG: 5 TABLET, FILM COATED ORAL at 08:41

## 2020-02-27 RX ADMIN — HYDRALAZINE HYDROCHLORIDE 50 MG: 50 TABLET, FILM COATED ORAL at 23:08

## 2020-02-27 RX ADMIN — APIXABAN 5 MG: 5 TABLET, FILM COATED ORAL at 20:26

## 2020-02-27 RX ADMIN — INSULIN LISPRO 6 UNITS: 100 INJECTION, SOLUTION INTRAVENOUS; SUBCUTANEOUS at 17:24

## 2020-02-27 RX ADMIN — ATORVASTATIN CALCIUM 40 MG: 40 TABLET, FILM COATED ORAL at 08:41

## 2020-02-27 RX ADMIN — DONEPEZIL HYDROCHLORIDE 5 MG: 5 TABLET, FILM COATED ORAL at 20:26

## 2020-02-27 RX ADMIN — INSULIN LISPRO 3 UNITS: 100 INJECTION, SOLUTION INTRAVENOUS; SUBCUTANEOUS at 13:16

## 2020-02-27 RX ADMIN — INSULIN LISPRO 3 UNITS: 100 INJECTION, SOLUTION INTRAVENOUS; SUBCUTANEOUS at 01:37

## 2020-02-27 RX ADMIN — SERTRALINE 100 MG: 100 TABLET, FILM COATED ORAL at 08:40

## 2020-02-27 RX ADMIN — Medication 10 ML: at 20:27

## 2020-02-27 ASSESSMENT — PAIN SCALES - GENERAL
PAINLEVEL_OUTOF10: 0

## 2020-02-27 NOTE — PROGRESS NOTES
mellitus, uncontrolled: Hemoglobin A1c on 9/19/2019 was 6.0, repeat pending. Continue Accu-Cheks before meals and at bedtime with sliding scale insulin coverage (high-dose corrective algorithm). Continue Lantus 15 units nightly. Maintain carb controlled diet. 10. ASHD s/p CABG: Continue aspirin, atorvastatin, and Eliquis. 11. Benign essential hypertension: History, stable. Continue Toprol-XL 25 mg daily. Maintain low-sodium diet. 12. Hyperlipidemia: History. Continue atorvastatin. 13. History of kidney cancer: S/p left nephrectomy in 2018. 14. Physical deconditioning: Patient is normally assisted out of bed with a Sosa lift at her ECF. PT/OT consulted, appreciate assistance. 15. Morbid obesity: BMI 45.5. Lifestyle modification encouraged. Chief Complaint: Fever, hypotension, vomiting    Initial H and P:-    Pj Martinez is a morbidly obese 51-year-old  female, lifetime non-smoker, with a medical history of arthritis and coronary artery disease, chronic kidney disease, stage II, type 2 diabetes mellitus, hypertension, hyperlipidemia, history of urinary tract infections, and overactive bladder. Patient presented to [facility] ER with complaints of fever, hypotension, nausea, and vomiting. Patient resides at [facility] skilled nursing [facility] and is bedbound. She does get up to the chair with a Sosa lift. The patient is a poor historian in the ER and the  was present at the bedside that helped contribute information. The patient was noted to be confused prior to coming in and poorly responsive. Hypotension was noted with a blood pressure of 78/57.  reported that the patient was nauseated and vomiting earlier in the day with a decreased oral intake. While in the ER, the patient received IV fluid boluses, was placed on a Levophed drip, and started on IV Zosyn and vancomycin. The patient was also found to be hyperkalemic.   Central line placement texture, turgor normal.  No rashes or lesions. Neurologic:  Neurovascularly intact without any focal sensory/motor deficits. Cranial nerves: II-XII intact, grossly non-focal.  Psychiatric: Alert and oriented to person, place, time. Thought content appropriate, normal insight  Capillary Refill: Brisk,< 3 seconds   Peripheral Pulses: +2 palpable, equal bilaterally     Labs:   Recent Labs     02/25/20  0500 02/25/20  2350 02/26/20  0515   WBC 6.7 7.0 5.9   HGB 10.0* 10.1* 9.7*   HCT 34.2* 34.1* 32.8*   * 130 113*     Recent Labs     02/24/20  1130 02/25/20  0500 02/25/20  1350 02/26/20  0515    145  --  142   K 4.6 4.4 4.3 3.9    112*  --  109   CO2 22* 22*  --  23   BUN 26* 20  --  11   CREATININE 1.5* 1.1  --  0.7   CALCIUM 8.2* 8.1*  --  8.0*     No results for input(s): AST, ALT, BILIDIR, BILITOT, ALKPHOS in the last 72 hours. No results for input(s): INR in the last 72 hours. No results for input(s): Erven Sell in the last 72 hours.     Microbiology:    Blood culture #1:   Lab Results   Component Value Date    BC No growth-preliminary  02/23/2020       Blood culture #2:No results found for: Nadeem Reidal    Organism:  Lab Results   Component Value Date    ORG Proteus mirabilis 02/24/2020       No results found for: LABGRAM    MRSA culture only:No results found for: [address] [place] [address]    Urine culture:   Lab Results   Component Value Date    LABURIN [place] count: 1,000 CFU/mL  02/24/2020       Respiratory culture: No results found for: CULTRESP    Aerobic and Anaerobic :  No results found for: LABAERO  No results found for: LABANAE    Urinalysis:      Lab Results   Component Value Date    NITRU NEGATIVE 02/23/2020    WBCUA 50-75 02/23/2020    WBCUA 0-5 05/12/2018    BACTERIA MODERATE 02/23/2020    RBCUA > 100 02/23/2020    BLOODU LARGE 02/23/2020    SPECGRAV 1.021 02/03/2018    GLUCOSEU 250 02/23/2020       Radiology:  CTA CHEST W WO CONTRAST   Final Result      Extensive bilateral acute multilobar pulmonary emboli with a large clot burden. No findings to suggest right ventricular strain. Mild nonspecific hazy bilateral groundglass opacities. Small bilateral pleural effusions with mild bilateral lower lobe atelectasis. Results discussed with NEELIMA Staples CNP on 2/25/2020 at 6:45 PM.      **This report has been created using voice recognition software. It may contain minor errors which are inherent in voice recognition technology. **      Final report electronically signed by Dr. Aure Hernandez on 2/25/2020 6:47 PM      [employer]   Final Result      No gallstones. Probable gallbladder wall thickening without wall edema or pericholecystic fluid. No biliary dilatation. Pancreatic body and tail obscured by bowel gas. **This report has been created using voice recognition software. It may contain minor errors which are inherent in voice recognition technology. **      Final report electronically signed by Dr. Aure Hernandez on 2/24/2020 6:19 AM      CT ABDOMEN PELVIS WO CONTRAST Additional Contrast? None   Final Result   Findings suspicious for acute cholecystitis. Consider gallbladder ultrasound for further evaluation. No obvious biliary dilatation. Fluid adjacent to the second and proximal third portions of the duodenum which may be secondary to the suspected acute cholecystitis. Cannot exclude duodenitis. New 2 mm nonobstructing right upper pole renal calculus. Mild ascites adjacent to the spleen and in the pelvis. **This report has been created using voice recognition software. It may contain minor errors which are inherent in voice recognition technology. **      Final report electronically signed by Dr. Aure Hernandez on 2/24/2020 1:52 AM      XR CHEST PORTABLE   Final Result      Right-sided central venous catheter, tip in the region of the upper/mid SVC. No pneumothorax. Stable very mild central interstitial prominence, see above. mass or intrahepatic biliary dilatation. Gallbladder/biliary tree: There is haziness and indistinctness of the wall the gallbladder with pericholecystic edema/inflammation suspicious for acute cholecystitis. Correlate clinically and consider right upper quadrant ultrasound. There are no calcified gallstones. There is no extrahepatic biliary dilatation. Spleen: Unremarkable Adrenals: Unremarkable. No obvious mass. Kidneys/ureters: Left kidney is absent consistent with history of previous nephrectomy. There is a 2 mm nonobstructing right upper pole renal calculus, new compared to the prior study. Right kidney again lies in a horizontal orientation, a benign normal variant. . No obvious mass, cyst, or hydroureteronephrosis. No secondary findings to suggest acute pyelonephritis. Pancreas: No obvious mass or pancreatic ductal dilatation. No findings to suggest acute pancreatitis. GI tract: There is a small amount of fluid adjacent to the second and proximal third portions of the duodenum, extending between the head of the pancreas and IVC. This may be due to the adjacent possible acute cholecystitis although cannot exclude acute duodenitis. The stomach is unremarkable. The remainder of the small bowel and the colon are unremarkable. No bowel wall thickening or bowel obstruction. Appendix: appendix is visualized and appears normal without evidence of acute appendicitis. Omentum/mesentery: Unremarkable, no adenopathy or mass. Intraperitoneal/retroperitoneal Space: There is a very small amount of fluid in the pelvis and there is a small amount of fluid adjacent to the spleen. . No obvious abscess, pathologically enlarged lymph nodes, or mass. No pneumoperitoneum. Vascular: No abdominal aortic aneurysm. No significant vascular abnormality. Reproductive: The uterus is absent consistent with hysterectomy. The adnexal regions are unremarkable. Bladder: The bladder is empty due to the presence of a Martinez catheter.  Body wall soft tissues: There is a small fat-containing umbilical hernia. . Musculoskeletal: There are degenerative changes of the thoracolumbar spine. There is no change in the slightly larger than 1 cm lytic lesion in the L5 vertebral body, stable dating back to the 2/8/2018 CT, likely a benign lesion, possibly a hemangioma. Findings suspicious for acute cholecystitis. Consider gallbladder ultrasound for further evaluation. No obvious biliary dilatation. Fluid adjacent to the second and proximal third portions of the duodenum which may be secondary to the suspected acute cholecystitis. Cannot exclude duodenitis. New 2 mm nonobstructing right upper pole renal calculus. Mild ascites adjacent to the spleen and in the pelvis. **This report has been created using voice recognition software. It may contain minor errors which are inherent in voice recognition technology. ** Final report electronically signed by Dr. Mellisa Carrizales on 2/24/2020 1:52 AM    Us Gallbladder Ruq    Result Date: 2/24/2020  PROCEDURE: US GALLBLADDER RUQ CLINICAL INFORMATION: acute cholecystitis, abdominal pain. COMPARISON: Noncontrast abdomen pelvis CT earlier today TECHNIQUE:Real-time transabdominal ultrasound was performed. FINDINGS: Pancreas: The pancreatic head is normal. The body and tail were obscured by bowel gas. Liver: Normal echogenicity. No mass or intrahepatic biliary dilatation. Size: Grossly normal size Gallbladder: There are no gallstones or sludge. There is probable gallbladder wall thickening. No wall edema or pericholecystic fluid. Sonographic Ramos's Sign: Negative CBD: CBD diameter is normal measuring 2.4 mm. Right Kidney: Limited imaging of the right kidney demonstrates no hydronephrosis. Ascites: none     No gallstones. Probable gallbladder wall thickening without wall edema or pericholecystic fluid. No biliary dilatation. Pancreatic body and tail obscured by bowel gas. **This report has been created using voice recognition software. the provided history. The wire courses in  the distribution of the right jugular vein, SVC, through the region of the right atrium into the region of the IVC. Distal tip is not imaged. Cannot confirm intravascular/intraluminal positioning on a chest x-ray. Lungs/pleura:  Interstitium is mildly prominent which may be chronic although cannot exclude mild pulmonary edema or an atypical pneumonia. There is no consolidation. No pleural effusion. No pneumothorax. Heart: There is stable mild cardiomegaly. Patient is status post sternotomy for CABG surgery. Mediastinum/corinne: No obvious mass or adenopathy. Skeleton: No significant bone or joint abnormality. Central line wire extending from the right neck into the medial right upper abdomen, see discussion above. No pneumothorax. Interstitial prominence, acute versus chronic. See discussion above and correlate clinically. Mild cardiomegaly status post CABG surgery. **This report has been created using voice recognition software. It may contain minor errors which are inherent in voice recognition technology. ** Final report electronically signed by Dr. Roberto Marc on 2/24/2020 12:31 AM    Xr Chest Portable    Result Date: 2/23/2020  PROCEDURE: XR CHEST PORTABLE CLINICAL INFORMATION: Sepsis TECHNIQUE: Mobile AP chest radiograph. COMPARISON: AP chest radiograph 3/23/2018 FINDINGS: Median sternotomy wires are again noted. There is mild stable enlargement of the cardiac silhouette. A calcified granuloma in the left lung is stable. There are no lung consolidations. Degenerative changes in the thoracic spine are poorly visualized. 1. No acute intrathoracic process. 2. Mild stable cardiomegaly. **This report has been created using voice recognition software. It may contain minor errors which are inherent in voice recognition technology. ** Final report electronically signed by Dr. Deana Sofia on 2/23/2020 7:29 PM    Electronically signed by NEELIMA Bates CNP on

## 2020-02-27 NOTE — PROGRESS NOTES
Order received for CVC removal .  Patient  in bed. Transparent dressing removed. Skin accessed appears pink,warm and dry. Two Sutures removed, area cleaned with chloro prep, bio patch applied, sterile gauze placed over bio patch, CVC removed with green tip intact, pressure held with sterile gauze for five minutes. New transparent dressing applied. Educated the patient on remaining in bed for one hour, dressing stays on for 24 hours, signs and symptoms of infection and notify primary nurse if any blood or oozing. Primary RN notified.

## 2020-02-27 NOTE — CARE COORDINATION
Update: spoke with Attending, IV Diuresis planned today, labs in am; likely ECF in am; collaborated with August Montgomery  Electronically signed by Satish Jeffries RN on 2/27/2020 at 1:09 PM

## 2020-02-27 NOTE — PROGRESS NOTES
Reassessment:  Patient alert and oriented to place and time; disoriented to situation. Upper extremities pink, warm and dry bilaterally. No numbness or tingling reported in either extremity. Radial pulses +1 bilaterally. Lung sounds are clear with diminished sounds in the lower lobes bilaterally. Patient on 2L of O2 via nasal cannula. Heart sounds are regular, with normal rate and rhythm. Abdomen soft and rounded with no distention present. Slight tenderness in the RUQ noted. Urinary catheter in place. Site is clean and dry; normal output of clear, yellow urine present. Lower extremities pink, warm and dry bilaterally. Post tibial and pedal pulses +1 bilaterally with normal rate and rhythm. Lower extremity +2 pitting edema present.

## 2020-02-27 NOTE — PROGRESS NOTES
[facility]    Renal inpatient Progress Note  2/27/2020 8:08 AM      Pt Name:   Justin Rios  YOB: 1940  Attending:   NEELIMA Burr CNP    Chief Complaint:   Justin Rios is a 70 y.o. female being followed by nephrology for acute kidney injury     Interval History : patient seen and examined by me. She is awake and alert no distress denies any chest pain or shortness of breath  Making decent urine output . Scheduled Medications :   apixaban  5 mg Oral BID    albuterol  2.5 mg Nebulization Q6H    metoprolol succinate  25 mg Oral Daily    cefTRIAXone (ROCEPHIN) IV  1 g Intravenous Q24H    [Held by provider] amLODIPine  10 mg Oral Daily    aspirin  81 mg Oral Daily    atorvastatin  40 mg Oral Daily    Vitamin D  2,000 Units Oral Daily    docusate sodium  100 mg Oral BID    [Held by provider] hydrALAZINE  50 mg Oral BID    magnesium oxide  400 mg Oral BID    sodium chloride flush  10 mL Intravenous 2 times per day    insulin lispro  0-18 Units Subcutaneous Q4H    donepezil  5 mg Oral Nightly    sertraline  100 mg Oral Daily    vitamin C  500 mg Oral Daily    insulin glargine  15 Units Subcutaneous Nightly      dextrose      norepinephrine Stopped (02/25/20 1116)        Vitals :  /63   Pulse 65   Temp 98.1 °F (36.7 °C) (Oral)   Resp 20   Ht 5' 3\" (1.6 m)   Wt 256 lb 2.8 oz (116.2 kg)   SpO2 95%   BMI 45.38 kg/m²     24HR INTAKE/OUTPUT:      Intake/Output Summary (Last 24 hours) at 2/27/2020 0808  Last data filed at 2/27/2020 0359  Gross per 24 hour   Intake 240 ml   Output 2265 ml   Net -2025 ml     Last 3 weights  Wt Readings from Last 3 Encounters:   02/25/20 256 lb 2.8 oz (116.2 kg)   04/09/19 235 lb (106.6 kg)   10/08/18 232 lb (105.2 kg)        Physical Exam :  General Appearance:  Well developed.  No distress  Mouth/Throat:  Oral mucosa moist  Neck:  Supple, no JVD  Lungs:  Breath sounds: clear, diminished  Heart[de-identified]  S1,S2 heard  Abdomen:  Soft, non - tender  Musculoskeletal:  Edema -noted     Last 3 CBC  Recent Labs     02/25/20  0500 02/25/20  2350 02/26/20  0515   WBC 6.7 7.0 5.9   RBC 3.59* 3.61* 3.48*   HGB 10.0* 10.1* 9.7*   HCT 34.2* 34.1* 32.8*   * 130 113*     Last 3 CMP  Recent Labs     02/24/20  1130 02/25/20  0500 02/25/20  1350 02/26/20  0515    145  --  142   K 4.6 4.4 4.3 3.9    112*  --  109   CO2 22* 22*  --  23   BUN 26* 20  --  11   CREATININE 1.5* 1.1  --  0.7   CALCIUM 8.2* 8.1*  --  8.0*        Assessment / Plan   Renal -acute kidney injury most likely secondary to acute tubular necrosis from hypotension/sepsis  · Renal function improved currently almost back to baseline  · Patient is in slight positive fluid balance. Weight increased may be by 15 pounds as per documentation  · 1 dose of diuretic today and monitor urine output and follow BMP  Hypotension resolved at this time   Electrolytes appear to be within normal limits  Mild metabolic acidosis secondary to acute kidney injury improving  Septic shock may be related to urinary tract infection, on antibiotics currently resolved  Change in mental status secondary to sepsis much better  Diabetes mellitus  History of left nephrectomy due to renal cancer in 2018  Slight fluid overload-we will give a dose of diuretic and monitor  meds reviewed. Discussed with the patient    EKATERINA Plasencia D.  [facility].

## 2020-02-27 NOTE — PLAN OF CARE
Problem: Nutrition Deficit:  Goal: Ability to achieve adequate nutritional intake will improve  Description  Ability to achieve adequate nutritional intake will improve  Outcome: Ongoing  Note:   Patient eating very little. Problem: Serum Glucose Level - Abnormal:  Goal: Ability to maintain appropriate glucose levels will improve to within specified parameters  Description  Ability to maintain appropriate glucose levels will improve to within specified parameters  2/27/2020 1536 by Chelsy Miner RN  Outcome: Ongoing  Note:   Checking blood glucose before meals. Sliding scale insulin given. Problem: Skin Integrity - Impaired:  Goal: Will show no infection signs and symptoms  Description  Will show no infection signs and symptoms  Outcome: Ongoing  Note:   No new skin breakdown noted this shift. Turning and repositioning every 2 hours with pillow support. Problem: Skin Integrity - Impaired:  Goal: Absence of new skin breakdown  Description  Absence of new skin breakdown  Outcome: Ongoing  Note:   Turning and repositioning every 2 hours with pillow support. Problem: Infection - Methicillin-Resistant Staphylococcus Aureus Infection:  Goal: Absence of methicillin-resistant Staphylococcus aureus infection  Description  Absence of methicillin-resistant Staphylococcus aureus infection  Outcome: Ongoing  Note:   Contact isolation. Gown and gloves being worn in room. Problem: Risk for Impaired Skin Integrity  Goal: Tissue integrity - skin and mucous membranes  Description  Structural intactness and normal physiological function of skin and  mucous membranes. Outcome: Ongoing  Note:   No new skin breakdown noted this shift. Problem: Pain:  Goal: Pain level will decrease  Description  Pain level will decrease  Outcome: Ongoing  Note:   No complaints of pain this shift. Will continue to monitor.       Problem: Pain:  Goal: Control of acute pain  Description  Control of acute pain  2/27/2020 1536 by Sudarshan Powell RN  Outcome: Ongoing  Note:   No complaints of acute pain noted this shift. Will continue to monitor. Problem: Pain:  Goal: Control of chronic pain  Description  Control of chronic pain  2/27/2020 1536 by Sudarshan Powell RN  Outcome: Ongoing  Note:   No complaints of chronic pain. Will continue to monitor. Problem: DISCHARGE BARRIERS  Goal: Patient's continuum of care needs are met  Outcome: Ongoing  Note:   From [facility]. Plan to discharge back tomorrow 2/28/2020     Problem: Infection - Central Venous Catheter-Associated Bloodstream Infection:  Goal: Will show no infection signs and symptoms  Description  Will show no infection signs and symptoms  Outcome: Completed  Note:   PICC line removed. Problem: Urinary Elimination:  Goal: Signs and symptoms of infection will decrease  Description  Signs and symptoms of infection will decrease  Outcome: Completed  Note:   Martinez catheter removed. Urinating ok. Problem: Urinary Elimination:  Goal: Complications related to the disease process, condition or treatment will be avoided or minimized  Description  Complications related to the disease process, condition or treatment will be avoided or minimized  Outcome: Completed  Note:   Martinez catheter removed. Urinating ok. Care plan reviewed with patient. Patient verbalize understanding of the plan of care and contribute to goal setting.

## 2020-02-27 NOTE — PLAN OF CARE
Problem: Falls - Risk of:  Goal: Will remain free from falls  Description  Will remain free from falls  Outcome: Met This Shift  Note:   Pt remained free from falls during shift. Call light remained within reach, bed kept in lowest position, and bed alarm turned on. Goal: Absence of physical injury  Description  Absence of physical injury  Outcome: Met This Shift  Note:   Pt remained free from physical injury during shift. No report of self-injury/self-harm       Problem: Bowel Function - Altered:  Goal: Bowel elimination is within specified parameters  Description  Bowel elimination is within specified parameters  Outcome: Met This Shift  Note:   Pt was able to have a bowel movement during shift. Problem: Mental Status - Impaired:  Goal: Mental status will be restored to baseline  Description  Mental status will be restored to baseline  Outcome: Met This Shift  Note:   Pt alert and oriented x2 to person and place. Unable to identify the current year or month. Pt has hx of alzheimers and dementia. Problem: Pain:  Goal: Control of acute pain  Description  Control of acute pain  Outcome: Met This Shift  Note:   Pt denied pain during shift. Goal: Control of chronic pain  Description  Control of chronic pain  Outcome: Met This Shift  Note:   On a scale of 0-10, 0 being no pain and 10 being the worst pain ever, pt rated her pain at a 0. Problem: Fluid Volume - Imbalance:  Goal: Absence of imbalanced fluid volume signs and symptoms  Description  Absence of imbalanced fluid volume signs and symptoms  Outcome: Ongoing  Note:   Pt still having edema generalized overnight. Problem: Serum Glucose Level - Abnormal:  Goal: Ability to maintain appropriate glucose levels will improve to within specified parameters  Description  Ability to maintain appropriate glucose levels will improve to within specified parameters  Outcome: Ongoing  Note:   Pts BS overnight were slightly elevated.  Given lantus and humalog to help control.

## 2020-02-28 VITALS
SYSTOLIC BLOOD PRESSURE: 155 MMHG | HEIGHT: 63 IN | HEART RATE: 58 BPM | WEIGHT: 256 LBS | TEMPERATURE: 98.2 F | BODY MASS INDEX: 45.36 KG/M2 | RESPIRATION RATE: 18 BRPM | OXYGEN SATURATION: 96 % | DIASTOLIC BLOOD PRESSURE: 80 MMHG

## 2020-02-28 LAB
ANION GAP SERPL CALCULATED.3IONS-SCNC: 12 MEQ/L (ref 8–16)
AVERAGE GLUCOSE: 144 MG/DL (ref 70–126)
BUN BLDV-MCNC: 7 MG/DL (ref 7–22)
CALCIUM SERPL-MCNC: 8.6 MG/DL (ref 8.5–10.5)
CHLORIDE BLD-SCNC: 108 MEQ/L (ref 98–111)
CO2: 23 MEQ/L (ref 23–33)
CREAT SERPL-MCNC: 0.6 MG/DL (ref 0.4–1.2)
GFR SERPL CREATININE-BSD FRML MDRD: > 90 ML/MIN/1.73M2
GLUCOSE BLD-MCNC: 113 MG/DL (ref 70–108)
GLUCOSE BLD-MCNC: 117 MG/DL (ref 70–108)
GLUCOSE BLD-MCNC: 179 MG/DL (ref 70–108)
GLUCOSE BLD-MCNC: 188 MG/DL (ref 70–108)
HBA1C MFR BLD: 6.8 % (ref 4.4–6.4)
PLATELET # BLD: 132 THOU/MM3 (ref 130–400)
POTASSIUM SERPL-SCNC: 3.9 MEQ/L (ref 3.5–5.2)
SODIUM BLD-SCNC: 143 MEQ/L (ref 135–145)

## 2020-02-28 PROCEDURE — 82948 REAGENT STRIP/BLOOD GLUCOSE: CPT

## 2020-02-28 PROCEDURE — 99239 HOSP IP/OBS DSCHRG MGMT >30: CPT | Performed by: NURSE PRACTITIONER

## 2020-02-28 PROCEDURE — 6370000000 HC RX 637 (ALT 250 FOR IP): Performed by: NURSE PRACTITIONER

## 2020-02-28 PROCEDURE — 2580000003 HC RX 258: Performed by: NURSE PRACTITIONER

## 2020-02-28 PROCEDURE — 36415 COLL VENOUS BLD VENIPUNCTURE: CPT

## 2020-02-28 PROCEDURE — 99232 SBSQ HOSP IP/OBS MODERATE 35: CPT | Performed by: INTERNAL MEDICINE

## 2020-02-28 PROCEDURE — 80048 BASIC METABOLIC PNL TOTAL CA: CPT

## 2020-02-28 PROCEDURE — 85049 AUTOMATED PLATELET COUNT: CPT

## 2020-02-28 PROCEDURE — 83036 HEMOGLOBIN GLYCOSYLATED A1C: CPT

## 2020-02-28 RX ORDER — METOPROLOL SUCCINATE 25 MG/1
25 TABLET, EXTENDED RELEASE ORAL DAILY
Qty: 30 TABLET | Refills: 0 | Status: SHIPPED | OUTPATIENT
Start: 2020-02-29

## 2020-02-28 RX ORDER — INSULIN GLARGINE 100 [IU]/ML
15 INJECTION, SOLUTION SUBCUTANEOUS NIGHTLY
Qty: 1 VIAL | Refills: 0 | Status: ON HOLD | OUTPATIENT
Start: 2020-02-28 | End: 2022-10-26

## 2020-02-28 RX ORDER — CEFDINIR 300 MG/1
300 CAPSULE ORAL EVERY 12 HOURS SCHEDULED
Qty: 6 CAPSULE | Refills: 0 | Status: SHIPPED | OUTPATIENT
Start: 2020-02-28 | End: 2020-03-02

## 2020-02-28 RX ORDER — CEFDINIR 300 MG/1
300 CAPSULE ORAL EVERY 12 HOURS SCHEDULED
Status: DISCONTINUED | OUTPATIENT
Start: 2020-02-28 | End: 2020-02-28 | Stop reason: HOSPADM

## 2020-02-28 RX ADMIN — ASPIRIN 81 MG: 81 TABLET ORAL at 08:46

## 2020-02-28 RX ADMIN — Medication 10 ML: at 08:46

## 2020-02-28 RX ADMIN — AMLODIPINE BESYLATE 10 MG: 10 TABLET ORAL at 11:13

## 2020-02-28 RX ADMIN — MAGNESIUM GLUCONATE 500 MG ORAL TABLET 400 MG: 500 TABLET ORAL at 08:46

## 2020-02-28 RX ADMIN — APIXABAN 5 MG: 5 TABLET, FILM COATED ORAL at 08:46

## 2020-02-28 RX ADMIN — VITAMIN D, TAB 1000IU (100/BT) 2000 UNITS: 25 TAB at 08:46

## 2020-02-28 RX ADMIN — METOPROLOL SUCCINATE 25 MG: 25 TABLET, FILM COATED, EXTENDED RELEASE ORAL at 11:13

## 2020-02-28 RX ADMIN — INSULIN LISPRO 1 UNITS: 100 INJECTION, SOLUTION INTRAVENOUS; SUBCUTANEOUS at 16:25

## 2020-02-28 RX ADMIN — CEFDINIR 300 MG: 300 CAPSULE ORAL at 12:35

## 2020-02-28 RX ADMIN — DOCUSATE SODIUM 100 MG: 100 CAPSULE, LIQUID FILLED ORAL at 08:46

## 2020-02-28 RX ADMIN — SERTRALINE 100 MG: 100 TABLET, FILM COATED ORAL at 08:46

## 2020-02-28 RX ADMIN — HYDRALAZINE HYDROCHLORIDE 50 MG: 50 TABLET, FILM COATED ORAL at 08:46

## 2020-02-28 RX ADMIN — OXYCODONE HYDROCHLORIDE AND ACETAMINOPHEN 500 MG: 500 TABLET ORAL at 08:46

## 2020-02-28 RX ADMIN — INSULIN LISPRO 1 UNITS: 100 INJECTION, SOLUTION INTRAVENOUS; SUBCUTANEOUS at 12:35

## 2020-02-28 RX ADMIN — ATORVASTATIN CALCIUM 40 MG: 40 TABLET, FILM COATED ORAL at 08:46

## 2020-02-28 ASSESSMENT — PAIN SCALES - GENERAL: PAINLEVEL_OUTOF10: 0

## 2020-02-28 NOTE — DISCHARGE SUMMARY
saturations. Kidney function studies normalized and there is a complete resolution of the patient's acute metabolic encephalopathy. Patient remained free of chest pain during her stay and her diabetes were tightly controlled with a corrective algorithm. At the time of discharge, the patient appears well and is alert and oriented to person, place, time, and situation denies any chest pain, shortness of breath, nausea, vomiting, abdominal pain, diarrhea, constipation, urinary complaints, lightheadedness, dizziness, headaches, changes in vision, fever, or chills. She denies any other physical complaints, was updated on the discharge plan of care, and has no other needs or questions at this time. Physical Exam:-  Vitals:   Patient Vitals for the past 24 hrs:   BP Temp Temp src Pulse Resp SpO2 Weight   02/28/20 1108 -- -- -- -- -- 92 % --   02/28/20 1107 -- -- -- -- -- (!) 89 % --   02/28/20 1105 134/65 98.2 °F (36.8 °C) Oral 68 18 (!) 88 % --   02/28/20 0830 -- -- -- -- -- 92 % --   02/28/20 0828 (!) 154/70 98.4 °F (36.9 °C) Oral 56 16 94 % --   02/28/20 0347 -- -- -- -- -- 92 % --   02/28/20 0341 (!) 155/72 97.9 °F (36.6 °C) Oral 62 16 (!) 88 % 256 lb (116.1 kg)   02/27/20 2312 -- -- -- -- -- 92 % --   02/27/20 2310 -- -- -- -- -- 95 % --   02/27/20 2307 (!) 173/76 98.2 °F (36.8 °C) Oral 60 18 97 % --   02/27/20 1930 (!) 150/76 96.9 °F (36.1 °C) Oral 65 20 93 % --   02/27/20 1500 (!) 152/74 96.7 °F (35.9 °C) Oral 65 18 94 % --     Weight:   Weight: 256 lb (116.1 kg)   24 hour intake/output:     Intake/Output Summary (Last 24 hours) at 2/28/2020 1321  Last data filed at 2/28/2020 1110  Gross per 24 hour   Intake 668.28 ml   Output 1075 ml   Net -406.72 ml       General appearance: Alert and appropriate, pleasant geriatric female. No apparent distress, appears stated age and cooperative. HEENT: Pupils equal, round, and reactive to light. Conjunctivae/corneas clear. Neck: Supple, with full range of motion.  No jugular venous distention. Trachea midline. Respiratory:  Normal respiratory effort. Clear to auscultation, bilaterally without Rales/Wheezes/Rhonchi. Cardiovascular: Regular rate and rhythm with normal S1/S2 without murmurs, rubs or gallops. Abdomen: Soft, non-tender, non-distended with normal bowel sounds. Musculoskeletal: Passive and active ROM x 4 extremities. Skin: Skin color, texture, turgor normal.  No rashes or lesions. Neurologic:  Neurovascularly intact without any focal sensory/motor deficits. Cranial nerves: II-XII intact, grossly non-focal.  Psychiatric: Alert and oriented to person, place, time, and situation. Thought content appropriate, normal insight  Capillary Refill: Brisk,< 3 seconds   Peripheral Pulses: +2 palpable, equal bilaterally    Discharge Medications:-      Medication List      START taking these medications    apixaban 5 MG Tabs tablet  Commonly known as:  ELIQUIS  Take 1 tablet by mouth 2 times daily     cefdinir 300 MG capsule  Commonly known as:  OMNICEF  Take 1 capsule by mouth every 12 hours for 3 days     insulin glargine 100 UNIT/ML injection vial  Commonly known as:  LANTUS  Inject 15 Units into the skin nightly  Replaces:  insulin glargine 100 UNIT/ML injection pen        CHANGE how you take these medications    metoprolol succinate 25 MG extended release tablet  Commonly known as:  TOPROL XL  Take 1 tablet by mouth daily  Start taking on:  February 29, 2020  What changed:    · medication strength  · how much to take        CONTINUE taking these medications    acetaminophen 500 MG tablet  Commonly known as:  TYLENOL     amLODIPine 10 MG tablet  Commonly known as:  NORVASC  Take 1 tablet by mouth daily     aspirin 81 MG EC tablet  Take 1 tablet by mouth daily     atorvastatin 40 MG tablet  Commonly known as:  Lipitor  Take 1 tablet by mouth daily     bisacodyl 10 MG suppository  Commonly known as:  DULCOLAX     blood glucose test strips  Test three times daily.   One Touch 5.2 meq/L    Chloride 109 98 - 111 meq/L    CO2 23 23 - 33 meq/L    Glucose 106 70 - 108 mg/dL    BUN 11 7 - 22 mg/dL    CREATININE 0.7 0.4 - 1.2 mg/dL    Calcium 8.0 (L) 8.5 - 10.5 mg/dL   Magnesium    Collection Time: 02/26/20  5:15 AM   Result Value Ref Range    Magnesium 1.7 1.6 - 2.4 mg/dL   CBC    Collection Time: 02/26/20  5:15 AM   Result Value Ref Range    WBC 5.9 4.8 - 10.8 thou/mm3    RBC 3.48 (L) 4.20 - 5.40 mill/mm3    Hemoglobin 9.7 (L) 12.0 - 16.0 gm/dl    Hematocrit 32.8 (L) 37.0 - 47.0 %    MCV 94.3 81.0 - 99.0 fL    MCH 27.9 26.0 - 33.0 pg    MCHC 29.6 (L) 32.2 - 35.5 gm/dl    RDW-CV 14.5 11.5 - 14.5 %    RDW-SD 49.7 (H) 35.0 - 45.0 fL    Platelets 795 (L) 047 - 400 thou/mm3    MPV 10.9 9.4 - 12.4 fL   Anion Gap    Collection Time: 02/26/20  5:15 AM   Result Value Ref Range    Anion Gap 10.0 8.0 - 16.0 meq/L   Glomerular Filtration Rate, Estimated    Collection Time: 02/26/20  5:15 AM   Result Value Ref Range    Est, Glom Filt Rate 81 (A) ml/min/1.73m2   POCT glucose    Collection Time: 02/26/20  7:41 AM   Result Value Ref Range    POC Glucose 143 (H) 70 - 108 mg/dl   APTT    Collection Time: 02/26/20 10:31 AM   Result Value Ref Range    aPTT 74.6 (H) 22.0 - 38.0 seconds   POCT glucose    Collection Time: 02/26/20 11:12 AM   Result Value Ref Range    POC Glucose 163 (H) 70 - 108 mg/dl   POCT glucose    Collection Time: 02/26/20  5:36 PM   Result Value Ref Range    POC Glucose 142 (H) 70 - 108 mg/dl   POCT glucose    Collection Time: 02/26/20  9:01 PM   Result Value Ref Range    POC Glucose 195 (H) 70 - 108 mg/dl   POCT glucose    Collection Time: 02/27/20  1:35 AM   Result Value Ref Range    POC Glucose 162 (H) 70 - 108 mg/dl   POCT glucose    Collection Time: 02/27/20  5:22 AM   Result Value Ref Range    POC Glucose 117 (H) 70 - 108 mg/dl   POCT glucose    Collection Time: 02/27/20 12:49 PM   Result Value Ref Range    POC Glucose 177 (H) 70 - 108 mg/dl   Basic Metabolic Panel    Collection Time: Estimated    Collection Time: 02/28/20  6:20 AM   Result Value Ref Range    Est, Glom Filt Rate >90 ml/min/1.73m2   POCT glucose    Collection Time: 02/28/20  6:28 AM   Result Value Ref Range    POC Glucose 113 (H) 70 - 108 mg/dl   POCT glucose    Collection Time: 02/28/20 11:07 AM   Result Value Ref Range    POC Glucose 188 (H) 70 - 108 mg/dl        Microbiology:    Blood culture #1:   Lab Results   Component Value Date    BC No growth-preliminary  02/23/2020       Blood culture #2:No results found for: Siva Steffen    Organism:  No results found for: LABGRAM    MRSA culture only:No results found for: [facility]    Urine culture:   Lab Results   Component Value Date    LABURIN Ulman count: 1,000 CFU/mL  02/24/2020     Lab Results   Component Value Date    ORG Proteus mirabilis 02/24/2020        Respiratory culture: No results found for: CULTRESP    Aerobic and Anaerobic :  No results found for: LABAERO  No results found for: LABANAE    Urinalysis:      Lab Results   Component Value Date    NITRU NEGATIVE 02/23/2020    WBCUA 50-75 02/23/2020    WBCUA 0-5 05/12/2018    BACTERIA MODERATE 02/23/2020    RBCUA > 100 02/23/2020    BLOODU LARGE 02/23/2020    SPECGRAV 1.021 02/03/2018    GLUCOSEU 250 02/23/2020       Radiology:-  Ct Abdomen Pelvis Wo Contrast Additional Contrast? None    Result Date: 2/24/2020  PROCEDURE: CT ABDOMEN PELVIS WO CONTRAST CLINICAL INFORMATION: recurrent UTIs only one kidney . COMPARISON: Abdomen pelvis CT without and with contrast dated 4/1/2019 TECHNIQUE: 5 mm images were obtained through the abdomen and pelvis. Sagittal and coronal reconstructions were obtained and reviewed. No oral or IV contrast was administered. All CT scans at this facility use dose modulation, iterative reconstruction, and/or weight-based dosing when appropriate to reduce radiation dose to as low as reasonably achievable.  FINDINGS: Limitations: Evaluation of the solid and hollow viscera is limited due to the lack of IV regions are unremarkable. Bladder: The bladder is empty due to the presence of a Martinez catheter. Body wall soft tissues: There is a small fat-containing umbilical hernia. . Musculoskeletal: There are degenerative changes of the thoracolumbar spine. There is no change in the slightly larger than 1 cm lytic lesion in the L5 vertebral body, stable dating back to the 2/8/2018 CT, likely a benign lesion, possibly a hemangioma. Findings suspicious for acute cholecystitis. Consider gallbladder ultrasound for further evaluation. No obvious biliary dilatation. Fluid adjacent to the second and proximal third portions of the duodenum which may be secondary to the suspected acute cholecystitis. Cannot exclude duodenitis. New 2 mm nonobstructing right upper pole renal calculus. Mild ascites adjacent to the spleen and in the pelvis. **This report has been created using voice recognition software. It may contain minor errors which are inherent in voice recognition technology. ** Final report electronically signed by Dr. Annabel Cheadle on 2/24/2020 1:52 AM    Us Gallbladder Ruq    Result Date: 2/24/2020  PROCEDURE: US GALLBLADDER RUQ CLINICAL INFORMATION: acute cholecystitis, abdominal pain. COMPARISON: Noncontrast abdomen pelvis CT earlier today TECHNIQUE:Real-time transabdominal ultrasound was performed. FINDINGS: Pancreas: The pancreatic head is normal. The body and tail were obscured by bowel gas. Liver: Normal echogenicity. No mass or intrahepatic biliary dilatation. Size: Grossly normal size Gallbladder: There are no gallstones or sludge. There is probable gallbladder wall thickening. No wall edema or pericholecystic fluid. Sonographic Ramos's Sign: Negative CBD: CBD diameter is normal measuring 2.4 mm. Right Kidney: Limited imaging of the right kidney demonstrates no hydronephrosis. Ascites: none     No gallstones. Probable gallbladder wall thickening without wall edema or pericholecystic fluid. No biliary dilatation. Pancreatic body and tail obscured by bowel gas. **This report has been created using voice recognition software. It may contain minor errors which are inherent in voice recognition technology. ** Final report electronically signed by Dr. Aure Hernandez on 2/24/2020 6:19 AM    Xr Chest Portable    Result Date: 2/24/2020  PROCEDURE: XR CHEST PORTABLE CLINICAL INFORMATION: central line plcmt. COMPARISON: Chest x-ray earlier today at 12:14 AM TECHNIQUE: AP Portable chest xray FINDINGS: Lines/tubes/devices: Interval placement of a right central venous catheter which may course in the distribution of the external jugular vein, tip in the upper/mid SVC region. The previously noted guidewire has been removed. Lungs/pleura: There is stable very mild central interstitial prominence, again either representing pulmonary fibrosis versus very mild interstitial pulmonary edema or atypical pneumonia. A subcentimeter calcified granuloma projects over the left lung base. There is no consolidation. No pleural effusion. No pneumothorax. Heart: There is stable mild cardiomegaly. Status post CABG surgery. Mediastinum/corinne: No obvious mass or adenopathy. Skeleton: No significant bone or joint abnormality. Right-sided central venous catheter, tip in the region of the upper/mid SVC. No pneumothorax. Stable very mild central interstitial prominence, see above. **This report has been created using voice recognition software. It may contain minor errors which are inherent in voice recognition technology. ** Final report electronically signed by Dr. Aure Hernandez on 2/24/2020 12:40 AM    Xr Chest Portable    Result Date: 2/24/2020  PROCEDURE: XR CHEST PORTABLE CLINICAL INFORMATION: central line attempted. COMPARISON: Chest x-ray dated 2/23/2020 TECHNIQUE: AP Portable chest xray FINDINGS: Limitations: Extrinsic artifact lies over the chest. Lines/tubes/devices:  There is a wire projecting in the region of the right neck, extending over the right side of the mediastinum into the upper abdomen consistent with the central line wire placement as noted in the provided history. The wire courses in  the distribution of the right jugular vein, SVC, through the region of the right atrium into the region of the IVC. Distal tip is not imaged. Cannot confirm intravascular/intraluminal positioning on a chest x-ray. Lungs/pleura:  Interstitium is mildly prominent which may be chronic although cannot exclude mild pulmonary edema or an atypical pneumonia. There is no consolidation. No pleural effusion. No pneumothorax. Heart: There is stable mild cardiomegaly. Patient is status post sternotomy for CABG surgery. Mediastinum/corinne: No obvious mass or adenopathy. Skeleton: No significant bone or joint abnormality. Central line wire extending from the right neck into the medial right upper abdomen, see discussion above. No pneumothorax. Interstitial prominence, acute versus chronic. See discussion above and correlate clinically. Mild cardiomegaly status post CABG surgery. **This report has been created using voice recognition software. It may contain minor errors which are inherent in voice recognition technology. ** Final report electronically signed by Dr. Jen Vogel on 2/24/2020 12:31 AM    Xr Chest Portable    Result Date: 2/23/2020  PROCEDURE: XR CHEST PORTABLE CLINICAL INFORMATION: Sepsis TECHNIQUE: Mobile AP chest radiograph. COMPARISON: AP chest radiograph 3/23/2018 FINDINGS: Median sternotomy wires are again noted. There is mild stable enlargement of the cardiac silhouette. A calcified granuloma in the left lung is stable. There are no lung consolidations. Degenerative changes in the thoracic spine are poorly visualized. 1. No acute intrathoracic process. 2. Mild stable cardiomegaly. **This report has been created using voice recognition software. It may contain minor errors which are inherent in voice recognition technology. ** Final report

## 2020-02-28 NOTE — PLAN OF CARE
Problem: Discharge Planning:  Goal: Participates in care planning  Description  Participates in care planning  2/27/2020 2345 by Lexi Connell RN  Outcome: Ongoing  Note:   Patient active in her plan of care. 2/27/2020 1536 by Santana Swain RN  Outcome: Ongoing  Note:   Planning on returned to [facility] at time of discharge. Goal: Discharged to appropriate level of care  Description  Discharged to appropriate level of care  2/27/2020 2345 by Lexi Connell RN  Outcome: Ongoing  Note:   Patient from [address] [place] [address] home and plans to return when medically stable. 2/27/2020 1536 by Santana Swain RN  Outcome: Ongoing  Note:   Planning on returning to [facility] at time of discharge. Problem: Bowel Function - Altered:  Goal: Bowel elimination is within specified parameters  Description  Bowel elimination is within specified parameters  2/27/2020 2345 by Lexi Connell RN  Outcome: Ongoing  Note:   No BM tonight. Abdomen is soft, rounded and non-tender. Active bowel sounds heard in all 4 quadrants. Patient is passing gas. 2/27/2020 1536 by Santana Swain RN  Outcome: Ongoing  Note:   Bowel sounds active in all four quadrants this shift. No bowel movement this shift. Problem: Cardiac Output - Decreased:  Goal: Hemodynamic stability will improve  Description  Hemodynamic stability will improve  2/27/2020 2345 by Lexi Connell RN  Outcome: Ongoing  Note:   Vital signs have remained stable and within normal limits. Vitals being monitored every 4 hours and as needed. Continuous cardiac monitor in place. Heart rhythm is NSR.   2/27/2020 1536 by Santana Swain RN  Outcome: Ongoing  Note:   Monitoring vital signs and lab results.       Problem: Fluid Volume - Imbalance:  Goal: Absence of imbalanced fluid volume signs and symptoms  Description  Absence of imbalanced fluid volume signs and symptoms  2/27/2020 2345 by Lexi Connell RN  Outcome: Ongoing  Note:   Patient has generalized swelling.   2/27/2020 1536 by Jie Fischer RN  Outcome: Ongoing  Note:   Strict intake and output being monitored every 8 hours. Problem: Mental Status - Impaired:  Goal: Mental status will be restored to baseline  Description  Mental status will be restored to baseline  2/27/2020 2345 by Brenda Garg RN  Outcome: Ongoing  Note:   Patient alert and oriented x 3. Nurse re-orienting patient to her surroundings. 2/27/2020 1536 by Jie Fischer RN  Outcome: Ongoing  Note:   Patient alert to name and place. Problem: Nutrition Deficit:  Goal: Ability to achieve adequate nutritional intake will improve  Description  Ability to achieve adequate nutritional intake will improve  2/27/2020 2345 by Brenda Garg RN  Outcome: Ongoing  Note:   Patient on a carb control diet. 2/27/2020 1536 by Jie Fischer RN  Outcome: Ongoing  Note:   Patient eating very little. Problem: Serum Glucose Level - Abnormal:  Goal: Ability to maintain appropriate glucose levels will improve to within specified parameters  Description  Ability to maintain appropriate glucose levels will improve to within specified parameters  2/27/2020 2345 by Brenda Garg RN  Outcome: Ongoing  Note:   Checking the patient's blood sugar before meals and at bedtime. 2/27/2020 1536 by Jie Fischer RN  Outcome: Ongoing  Note:   Checking blood glucose before meals. Sliding scale insulin given. Problem: Infection - Methicillin-Resistant Staphylococcus Aureus Infection:  Goal: Absence of methicillin-resistant Staphylococcus aureus infection  Description  Absence of methicillin-resistant Staphylococcus aureus infection  2/27/2020 2345 by Brenda Garg RN  Outcome: Ongoing  Note:   Patient in contact isolation for MRSA. 2/27/2020 1536 by Jie Fischer RN  Outcome: Ongoing  Note:   Contact isolation. Gown and gloves being worn in room.       Problem:

## 2020-02-28 NOTE — PROGRESS NOTES
vomiting earlier in the day with a decreased oral intake. While in the ER, the patient received IV fluid boluses, was placed on a Levophed drip, and started on IV Zosyn and vancomycin. The patient was also found to be hyperkalemic. Central line placement was initiated and the patient was subsequently transferred to intensive care and admitted for septic shock. After a short stay in the ICU, the patient was able to be weaned off of the Levophed and transition to the ICU stepdown unit. The hospitalist service was contacted for further evaluation, treatment, and management. Subjective (past 24 hours):   Patient is awake and alert, sitting up in bed watching TV at the time of the interview. Currently denies any physical complaints at this time. Patient also denies any chest pain, shortness of breath, nausea, vomiting, abdominal pain, diarrhea, constipation, and's, lightheadedness, dizziness, headaches, vision changes, fevers, or chills. She was updated on the current plan of care and had no other needs or questions at this time. Past medical history, family history, social history and allergies reviewed again and is unchanged since admission. ROS (14 point review of systems completed. Pertinent positives noted. Otherwise ROS is negative) :  GENERAL: No fever,chills, or night sweats. SKIN: No lesions or rashes. HEAD: No headaches or recent injury. EYES: No acute changes in vision, no diplopia or blurred vision. EARS: No hearing loss, no tinnitus. NOSE/THROAT: No rhinorrhea or pharyngitis, no nasal drainage. NECK: No lumps or unusual neck stiffness. PULMONARY: Respirations easy and non-labored, no acute distress. CARDIAC: No chest pain, pressure, Negative for lower leg edema. GI: Abdomen is soft and non-tender, non-distended. PERIPHERAL VASCULAR: No intermittent claudication or unusual leg cramps. MUSCULOSKELETAL: Occasional arthralgias, myalgias.   NEUROLOGICAL: Denies any headache, near syncope, seizures or syncope. HEMATOLOGIC:  No unusual bruising or bleeding. PSYCH: Denies any homicidal or suicidial ideations. Medications:  Reviewed    Infusion Medications    dextrose      norepinephrine Stopped (02/25/20 1116)     Scheduled Medications    apixaban  5 mg Oral BID    metoprolol succinate  25 mg Oral Daily    cefTRIAXone (ROCEPHIN) IV  1 g Intravenous Q24H    [Held by provider] amLODIPine  10 mg Oral Daily    aspirin  81 mg Oral Daily    atorvastatin  40 mg Oral Daily    Vitamin D  2,000 Units Oral Daily    docusate sodium  100 mg Oral BID    [Held by provider] hydrALAZINE  50 mg Oral BID    magnesium oxide  400 mg Oral BID    sodium chloride flush  10 mL Intravenous 2 times per day    insulin lispro  0-18 Units Subcutaneous Q4H    donepezil  5 mg Oral Nightly    sertraline  100 mg Oral Daily    vitamin C  500 mg Oral Daily    insulin glargine  15 Units Subcutaneous Nightly     PRN Meds: sodium chloride flush, acetaminophen, acetaminophen, polyethylene glycol, promethazine, ondansetron, glucose, dextrose, glucagon (rDNA), dextrose, bisacodyl      Intake/Output Summary (Last 24 hours) at 2/27/2020 2028  Last data filed at 2/27/2020 2026  Gross per 24 hour   Intake 80 ml   Output 3715 ml   Net -3635 ml       Diet:  DIET GENERAL; Carb Control: 5 carb choices (75 gms)/meal; Low Sodium (2 GM)    Exam:  BP (!) 150/76   Pulse 65   Temp 96.9 °F (36.1 °C) (Oral)   Resp 20   Ht 5' 3\" (1.6 m)   Wt 256 lb 2.8 oz (116.2 kg)   SpO2 93%   BMI 45.38 kg/m²     appearance: Alert and appropriate, pleasant geriatric female. No apparent distress, appears stated age and cooperative. HEENT: Pupils equal, round, and reactive to light. Conjunctivae/corneas clear. Neck: Supple, with full range of motion. No jugular venous distention. Trachea midline. Respiratory:  Normal respiratory effort. Clear to auscultation, bilaterally without Rales/Wheezes/Rhonchi.   Cardiovascular: Regular rate and rhythm with normal S1/S2 without murmurs, rubs or gallops. Abdomen: Soft, non-tender, non-distended with normal bowel sounds. Musculoskeletal: Passive and active ROM x 4 extremities. Skin: Skin color, texture, turgor normal.  No rashes or lesions. Neurologic:  Neurovascularly intact without any focal sensory/motor deficits. Cranial nerves: II-XII intact, grossly non-focal.  Psychiatric: Alert and oriented to person, place, time, and situation. Thought content appropriate, normal insight  Capillary Refill: Brisk,< 3 seconds   Peripheral Pulses: +2 palpable, equal bilaterally     Labs:   Recent Labs     02/25/20  2350 02/26/20  0515 02/27/20  1400   WBC 7.0 5.9 5.4   HGB 10.1* 9.7* 10.4*   HCT 34.1* 32.8* 34.4*    113* 127*     Recent Labs     02/25/20  0500 02/25/20  1350 02/26/20  0515 02/27/20  1400     --  142 144   K 4.4 4.3 3.9 3.7   *  --  109 107   CO2 22*  --  23 24   BUN 20  --  11 9   CREATININE 1.1  --  0.7 0.7   CALCIUM 8.1*  --  8.0* 8.8     No results for input(s): AST, ALT, BILIDIR, BILITOT, ALKPHOS in the last 72 hours. No results for input(s): INR in the last 72 hours. No results for input(s): Eugune Ran in the last 72 hours.     Microbiology:    Blood culture #1:   Lab Results   Component Value Date    BC No growth-preliminary  02/23/2020       Blood culture #2:No results found for: [place]    Organism:  Lab Results   Component Value Date    ORG Proteus mirabilis 02/24/2020       No results found for: LABGRAM    MRSA culture only:No results found for: [facility]    Urine culture:   Lab Results   Component Value Date    LABURIN [place] count: 1,000 CFU/mL  02/24/2020       Respiratory culture: No results found for: CULTRESP    Aerobic and Anaerobic :  No results found for: LABAERO  No results found for: LABANAE    Urinalysis:      Lab Results   Component Value Date    NITRU NEGATIVE 02/23/2020    WBCUA 50-75 02/23/2020    WBCUA 0-5 05/12/2018    BACTERIA MODERATE 02/23/2020    RBCUA > 100 02/23/2020    BLOODU LARGE 02/23/2020    SPECGRAV 1.021 02/03/2018    GLUCOSEU 250 02/23/2020       Radiology:  CTA CHEST W WO CONTRAST   Final Result      Extensive bilateral acute multilobar pulmonary emboli with a large clot burden. No findings to suggest right ventricular strain. Mild nonspecific hazy bilateral groundglass opacities. Small bilateral pleural effusions with mild bilateral lower lobe atelectasis. Results discussed with NEELIMA Rowland CNP on 2/25/2020 at 6:45 PM.      **This report has been created using voice recognition software. It may contain minor errors which are inherent in voice recognition technology. **      Final report electronically signed by Dr. Danyell Montesinos on 2/25/2020 6:47 PM      [address]   Final Result      No gallstones. Probable gallbladder wall thickening without wall edema or pericholecystic fluid. No biliary dilatation. Pancreatic body and tail obscured by bowel gas. **This report has been created using voice recognition software. It may contain minor errors which are inherent in voice recognition technology. **      Final report electronically signed by Dr. Danyell Montesinos on 2/24/2020 6:19 AM      CT ABDOMEN PELVIS WO CONTRAST Additional Contrast? None   Final Result   Findings suspicious for acute cholecystitis. Consider gallbladder ultrasound for further evaluation. No obvious biliary dilatation. Fluid adjacent to the second and proximal third portions of the duodenum which may be secondary to the suspected acute cholecystitis. Cannot exclude duodenitis. New 2 mm nonobstructing right upper pole renal calculus. Mild ascites adjacent to the spleen and in the pelvis. **This report has been created using voice recognition software. It may contain minor errors which are inherent in voice recognition technology. **      Final report electronically signed by Dr. Danyell Montesinos on 2/24/2020 1:52 AM      XR CHEST PORTABLE   Final Result      Right-sided central venous catheter, tip in the region of the upper/mid SVC. No pneumothorax. Stable very mild central interstitial prominence, see above. **This report has been created using voice recognition software. It may contain minor errors which are inherent in voice recognition technology. **      Final report electronically signed by Dr. Aure Hernandez on 2/24/2020 12:40 AM      XR CHEST PORTABLE   Final Result      Central line wire extending from the right neck into the medial right upper abdomen, see discussion above. No pneumothorax. Interstitial prominence, acute versus chronic. See discussion above and correlate clinically. Mild cardiomegaly status post CABG surgery. **This report has been created using voice recognition software. It may contain minor errors which are inherent in voice recognition technology. **      Final report electronically signed by Dr. Aure Hernandez on 2/24/2020 12:31 AM      XR CHEST PORTABLE   Final Result   1. No acute intrathoracic process. 2. Mild stable cardiomegaly. **This report has been created using voice recognition software. It may contain minor errors which are inherent in voice recognition technology. **      Final report electronically signed by Dr. Cora Emery on 2/23/2020 7:29 PM        Ct Abdomen Pelvis Wo Contrast Additional Contrast? None    Result Date: 2/24/2020  PROCEDURE: CT ABDOMEN PELVIS WO CONTRAST CLINICAL INFORMATION: recurrent UTIs only one kidney . COMPARISON: Abdomen pelvis CT without and with contrast dated 4/1/2019 TECHNIQUE: 5 mm images were obtained through the abdomen and pelvis. Sagittal and coronal reconstructions were obtained and reviewed. No oral or IV contrast was administered.  All CT scans at this facility use dose modulation, iterative reconstruction, and/or weight-based dosing when appropriate to reduce radiation dose to as low as reasonably achievable. FINDINGS: Limitations: Evaluation of the solid and hollow viscera is limited due to the lack of IV contrast. Lower chest: The lung bases are clear. There is borderline/mild cardiomegaly. Liver: No obvious mass or intrahepatic biliary dilatation. Gallbladder/biliary tree: There is haziness and indistinctness of the wall the gallbladder with pericholecystic edema/inflammation suspicious for acute cholecystitis. Correlate clinically and consider right upper quadrant ultrasound. There are no calcified gallstones. There is no extrahepatic biliary dilatation. Spleen: Unremarkable Adrenals: Unremarkable. No obvious mass. Kidneys/ureters: Left kidney is absent consistent with history of previous nephrectomy. There is a 2 mm nonobstructing right upper pole renal calculus, new compared to the prior study. Right kidney again lies in a horizontal orientation, a benign normal variant. . No obvious mass, cyst, or hydroureteronephrosis. No secondary findings to suggest acute pyelonephritis. Pancreas: No obvious mass or pancreatic ductal dilatation. No findings to suggest acute pancreatitis. GI tract: There is a small amount of fluid adjacent to the second and proximal third portions of the duodenum, extending between the head of the pancreas and IVC. This may be due to the adjacent possible acute cholecystitis although cannot exclude acute duodenitis. The stomach is unremarkable. The remainder of the small bowel and the colon are unremarkable. No bowel wall thickening or bowel obstruction. Appendix: appendix is visualized and appears normal without evidence of acute appendicitis. Omentum/mesentery: Unremarkable, no adenopathy or mass. Intraperitoneal/retroperitoneal Space: There is a very small amount of fluid in the pelvis and there is a small amount of fluid adjacent to the spleen. . No obvious abscess, pathologically enlarged lymph nodes, or mass. No pneumoperitoneum. Vascular: No abdominal aortic aneurysm.  No significant vascular abnormality. Reproductive: The uterus is absent consistent with hysterectomy. The adnexal regions are unremarkable. Bladder: The bladder is empty due to the presence of a Martinez catheter. Body wall soft tissues: There is a small fat-containing umbilical hernia. . Musculoskeletal: There are degenerative changes of the thoracolumbar spine. There is no change in the slightly larger than 1 cm lytic lesion in the L5 vertebral body, stable dating back to the 2/8/2018 CT, likely a benign lesion, possibly a hemangioma. Findings suspicious for acute cholecystitis. Consider gallbladder ultrasound for further evaluation. No obvious biliary dilatation. Fluid adjacent to the second and proximal third portions of the duodenum which may be secondary to the suspected acute cholecystitis. Cannot exclude duodenitis. New 2 mm nonobstructing right upper pole renal calculus. Mild ascites adjacent to the spleen and in the pelvis. **This report has been created using voice recognition software. It may contain minor errors which are inherent in voice recognition technology. ** Final report electronically signed by Dr. Laura Le on 2/24/2020 1:52 AM    Us Gallbladder Ruq    Result Date: 2/24/2020  PROCEDURE: US GALLBLADDER RUQ CLINICAL INFORMATION: acute cholecystitis, abdominal pain. COMPARISON: Noncontrast abdomen pelvis CT earlier today TECHNIQUE:Real-time transabdominal ultrasound was performed. FINDINGS: Pancreas: The pancreatic head is normal. The body and tail were obscured by bowel gas. Liver: Normal echogenicity. No mass or intrahepatic biliary dilatation. Size: Grossly normal size Gallbladder: There are no gallstones or sludge. There is probable gallbladder wall thickening. No wall edema or pericholecystic fluid. Sonographic Ramos's Sign: Negative CBD: CBD diameter is normal measuring 2.4 mm. Right Kidney: Limited imaging of the right kidney demonstrates no hydronephrosis.  Ascites: none     No gallstones. Probable gallbladder wall thickening without wall edema or pericholecystic fluid. No biliary dilatation. Pancreatic body and tail obscured by bowel gas. **This report has been created using voice recognition software. It may contain minor errors which are inherent in voice recognition technology. ** Final report electronically signed by Dr. Macrina Pulido on 2/24/2020 6:19 AM    Xr Chest Portable    Result Date: 2/24/2020  PROCEDURE: XR CHEST PORTABLE CLINICAL INFORMATION: central line plcmt. COMPARISON: Chest x-ray earlier today at 12:14 AM TECHNIQUE: AP Portable chest xray FINDINGS: Lines/tubes/devices: Interval placement of a right central venous catheter which may course in the distribution of the external jugular vein, tip in the upper/mid SVC region. The previously noted guidewire has been removed. Lungs/pleura: There is stable very mild central interstitial prominence, again either representing pulmonary fibrosis versus very mild interstitial pulmonary edema or atypical pneumonia. A subcentimeter calcified granuloma projects over the left lung base. There is no consolidation. No pleural effusion. No pneumothorax. Heart: There is stable mild cardiomegaly. Status post CABG surgery. Mediastinum/corinne: No obvious mass or adenopathy. Skeleton: No significant bone or joint abnormality. Right-sided central venous catheter, tip in the region of the upper/mid SVC. No pneumothorax. Stable very mild central interstitial prominence, see above. **This report has been created using voice recognition software. It may contain minor errors which are inherent in voice recognition technology. ** Final report electronically signed by Dr. Macrina Pulido on 2/24/2020 12:40 AM    Xr Chest Portable    Result Date: 2/24/2020  PROCEDURE: XR CHEST PORTABLE CLINICAL INFORMATION: central line attempted.  COMPARISON: Chest x-ray dated 2/23/2020 TECHNIQUE: AP Portable chest xray FINDINGS: Limitations: Extrinsic artifact lies software. It may contain minor errors which are inherent in voice recognition technology. ** Final report electronically signed by Dr. Elisabeth Delgado on 2/23/2020 7:29 PM      Electronically signed by NEELIMA Farooq CNP on 2/27/2020 at 8:28 PM

## 2020-02-28 NOTE — PROGRESS NOTES
In bed awake. Alert and oriented x3. Lungs clear throughout. Bowel sounds hyperactive. Hand grasp strong and equal bilaterally. Pedal push and pull strong and equal bilaterally. Denies pain. Bed in low position. Call light and bedside table within reach. Calm and cooperative.

## 2020-02-28 NOTE — FLOWSHEET NOTE
Philomena Del Angel was in bed, but she did want prayer. 02/28/20 1400   Encounter Summary   Services provided to: Patient   Referral/Consult From: [facility]   Continue Visiting Yes  (2/28 )   Complexity of Encounter Moderate   Length of Encounter 15 minutes   Spiritual Assessment Completed Yes   Spiritual/Congregational   Type Spiritual support   Assessment Approachable   Intervention Nurtured hope;Prayer   Outcome Expressed gratitude;Comfort   follow up as needed.

## 2020-02-29 LAB
BLOOD CULTURE, ROUTINE: NORMAL
BLOOD CULTURE, ROUTINE: NORMAL

## 2021-02-23 ENCOUNTER — FOLLOWUP TELEPHONE ENCOUNTER (OUTPATIENT)
Dept: PALLATIVE CARE | Age: 81
End: 2021-02-23

## 2021-02-23 NOTE — TELEPHONE ENCOUNTER
[address]       [id number]       MRN [id number]     Signed on with palliative care 3/18/2020. Referral from facility MD, s/p sepsis admission. . Dr. Ubaldo Hudson attending physician. Visit completed in facility 2/18/21. Patient resides at [facility]. Staff understands RN palliative visit is a hands-off assessment in accordance with [place] and non-billable. Staff denies patient having fever/chills, cough, SOB, breathing difficulties, new fatigue, muscle aches, headache, loss of tast or smell, sore throat, congestion/runny nose, nausea/vomiting or diarrhea. REASON FOR CONSULT:  Patient had been hospitalized Feb 2020 for sepsis r/t UTI. SUBJECTIVE/OBJECTIVE:  HPI  Demenia, DM II, HLD, dpression, anxiety, HTN, ASHD, overactive bladder, CKD, Hx kidney Ca 2019, CABG, artificial right knee. Review of Systems   Constitutional:  Appetite good. Positive for fatigue at times. Patient with flucuating weight-see below. Respiratory:  Normal effort at rest, no use of accessory muscles. No SOB at rest.  No audible adventitious lung sounds. Cardiovascular:  Negative for edema, pitting. Musculoskeletal: Negative for arthralgias, myalgias and structural changes. Denies pain at rest.  Right artificial knee. Clementeen Abelson used. Gastrointestinal:  Abdomen round. Incontinent of bowel. Urological:  Hx overactive bladder. Incontinent of urine. Frequent UTIs. Integumentary:  No documented skin issues. Psychiatric/Behavioral/Neuro:  Positive for confusion. Negative for behavioral issues. Minimal speech this visit. Negative for tremors. Vitals/Weights  Most recent:  117/64, 97.5, 70, 20, 93%.  Chem 106    2/2/21    #260.8  BMI 45.35    12/02     #255.6    48/2/20  #260.2     [facility] Planning  Current code status:  DNR-CCA     PROBLEMS/PLAN: 1. UTIs: Patient has hx of UTIs frequently. Hx Lt kidney carcinoma, CKD. Has been seen by urology. Vitamin C on current med list.  Last Hospitalization 2/23/2020. Radha care per staff. 2. Palliative Care Encounter:   updated on visit. Much emotional support offered due to not being able to visit often. He did see patient 3 weeks ago, but patient does not understand why he won't hug her and lie down with her as instructed by staff during covid precautions. Plan to visit patient in one month. Will continue to offer education, offer emotional support, made medication or dietary recommendations if indicated and assess overall condition. I collaborate with the PCP, medical director of palliative care, [facility] home infusion pharmacy/facility pharmacy and [facility] registered dietitians to achieve team-based health care. Orders are provided by the PCP. On 02/18/21 I have spent 45 minutes reviewing electronic chart, visiting with patient, speaking with staff, updating family(completed 2/23/21) and documenting. An electronic signature was used to authenticate this note.      Emil Oneal RN, BSN, [address] Palliative Care Liaison  [facility]

## 2021-04-30 ENCOUNTER — FOLLOWUP TELEPHONE ENCOUNTER (OUTPATIENT)
Dept: ONCOLOGY | Age: 81
End: 2021-04-30

## 2022-01-01 ENCOUNTER — HOSPITAL ENCOUNTER (INPATIENT)
Age: 82
LOS: 1 days | DRG: 871 | End: 2022-10-27
Attending: INTERNAL MEDICINE | Admitting: INTERNAL MEDICINE
Payer: MEDICARE

## 2022-01-01 ENCOUNTER — APPOINTMENT (OUTPATIENT)
Dept: CT IMAGING | Age: 82
DRG: 871 | End: 2022-01-01
Payer: MEDICARE

## 2022-01-01 ENCOUNTER — APPOINTMENT (OUTPATIENT)
Dept: GENERAL RADIOLOGY | Age: 82
DRG: 871 | End: 2022-01-01
Payer: MEDICARE

## 2022-01-01 ENCOUNTER — APPOINTMENT (OUTPATIENT)
Dept: ULTRASOUND IMAGING | Age: 82
DRG: 871 | End: 2022-01-01
Payer: MEDICARE

## 2022-01-01 ENCOUNTER — APPOINTMENT (OUTPATIENT)
Dept: CARDIAC CATH/INVASIVE PROCEDURES | Age: 82
DRG: 871 | End: 2022-01-01
Payer: MEDICARE

## 2022-01-01 VITALS
TEMPERATURE: 99.1 F | BODY MASS INDEX: 45.58 KG/M2 | RESPIRATION RATE: 16 BRPM | WEIGHT: 241.4 LBS | DIASTOLIC BLOOD PRESSURE: 57 MMHG | HEIGHT: 61 IN | OXYGEN SATURATION: 82 % | SYSTOLIC BLOOD PRESSURE: 109 MMHG

## 2022-01-01 DIAGNOSIS — R10.84 GENERALIZED ABDOMINAL PAIN: ICD-10-CM

## 2022-01-01 DIAGNOSIS — E86.0 DEHYDRATION: Primary | ICD-10-CM

## 2022-01-01 DIAGNOSIS — R41.82 ALTERED MENTAL STATUS, UNSPECIFIED ALTERED MENTAL STATUS TYPE: ICD-10-CM

## 2022-01-01 DIAGNOSIS — N30.01 ACUTE CYSTITIS WITH HEMATURIA: ICD-10-CM

## 2022-01-01 DIAGNOSIS — A41.9 SEPTICEMIA (HCC): ICD-10-CM

## 2022-01-01 LAB
ALBUMIN SERPL-MCNC: 2.9 G/DL (ref 3.5–5.1)
ALLEN TEST: ABNORMAL
ALLEN TEST: ABNORMAL
ALP BLD-CCNC: 61 U/L (ref 38–126)
ALT SERPL-CCNC: 12 U/L (ref 11–66)
AMORPHOUS: ABNORMAL
ANION GAP SERPL CALCULATED.3IONS-SCNC: 18 MEQ/L (ref 8–16)
ANION GAP SERPL CALCULATED.3IONS-SCNC: 21 MEQ/L (ref 8–16)
ANION GAP SERPL CALCULATED.3IONS-SCNC: 22 MEQ/L (ref 8–16)
ANION GAP SERPL CALCULATED.3IONS-SCNC: 23 MEQ/L (ref 8–16)
ANION GAP SERPL CALCULATED.3IONS-SCNC: 24 MEQ/L (ref 8–16)
ANION GAP SERPL CALCULATED.3IONS-SCNC: 25 MEQ/L (ref 8–16)
ANION GAP SERPL CALCULATED.3IONS-SCNC: 25 MEQ/L (ref 8–16)
ANION GAP SERPL CALCULATED.3IONS-SCNC: 26 MEQ/L (ref 8–16)
ANION GAP SERPL CALCULATED.3IONS-SCNC: 27 MEQ/L (ref 8–16)
APTT: 31.3 SECONDS (ref 22–38)
APTT: 33.7 SECONDS (ref 22–38)
AST SERPL-CCNC: 23 U/L (ref 5–40)
AVERAGE GLUCOSE: 78 MG/DL (ref 70–126)
BACTERIA: ABNORMAL
BACTERIA: ABNORMAL /HPF
BASE EXCESS (CALCULATED): -13.8 MMOL/L (ref -2.5–2.5)
BASE EXCESS (CALCULATED): -5 MMOL/L (ref -2.5–2.5)
BASE EXCESS (CALCULATED): -7.5 MMOL/L (ref -2.5–2.5)
BASE EXCESS MIXED: -4.1 MMOL/L (ref -2–3)
BASE EXCESS MIXED: -9.8 MMOL/L (ref -2–3)
BASOPHILS # BLD: 0.1 %
BASOPHILS # BLD: 0.3 %
BASOPHILS ABSOLUTE: 0 THOU/MM3 (ref 0–0.1)
BASOPHILS ABSOLUTE: 0.1 THOU/MM3 (ref 0–0.1)
BILIRUB SERPL-MCNC: 0.4 MG/DL (ref 0.3–1.2)
BILIRUBIN URINE: ABNORMAL
BILIRUBIN URINE: NEGATIVE
BLOOD, URINE: ABNORMAL
BLOOD, URINE: ABNORMAL
BUN BLDV-MCNC: 15 MG/DL (ref 7–22)
BUN BLDV-MCNC: 23 MG/DL (ref 7–22)
BUN BLDV-MCNC: 42 MG/DL (ref 7–22)
BUN BLDV-MCNC: 74 MG/DL (ref 7–22)
BUN BLDV-MCNC: 79 MG/DL (ref 7–22)
BUN BLDV-MCNC: 80 MG/DL (ref 7–22)
BUN BLDV-MCNC: 81 MG/DL (ref 7–22)
BUN BLDV-MCNC: 83 MG/DL (ref 7–22)
BUN BLDV-MCNC: 9 MG/DL (ref 7–22)
CALCIUM IONIZED: 0.82 MMOL/L (ref 1.12–1.32)
CALCIUM IONIZED: 0.86 MMOL/L (ref 1.12–1.32)
CALCIUM IONIZED: 0.86 MMOL/L (ref 1.12–1.32)
CALCIUM IONIZED: 0.9 MMOL/L (ref 1.12–1.32)
CALCIUM IONIZED: 0.91 MMOL/L (ref 1.12–1.32)
CALCIUM IONIZED: 0.95 MMOL/L (ref 1.12–1.32)
CALCIUM IONIZED: 0.97 MMOL/L (ref 1.12–1.32)
CALCIUM IONIZED: 0.98 MMOL/L (ref 1.12–1.32)
CALCIUM SERPL-MCNC: 7.4 MG/DL (ref 8.5–10.5)
CALCIUM SERPL-MCNC: 7.4 MG/DL (ref 8.5–10.5)
CALCIUM SERPL-MCNC: 7.5 MG/DL (ref 8.5–10.5)
CALCIUM SERPL-MCNC: 7.7 MG/DL (ref 8.5–10.5)
CALCIUM SERPL-MCNC: 8 MG/DL (ref 8.5–10.5)
CALCIUM SERPL-MCNC: 8 MG/DL (ref 8.5–10.5)
CALCIUM SERPL-MCNC: 8.1 MG/DL (ref 8.5–10.5)
CASTS 2: ABNORMAL /LPF
CASTS UA: ABNORMAL /LPF
CASTS: ABNORMAL /LPF
CHARACTER, URINE: ABNORMAL
CHARACTER, URINE: ABNORMAL
CHLORIDE BLD-SCNC: 102 MEQ/L (ref 98–111)
CHLORIDE BLD-SCNC: 103 MEQ/L (ref 98–111)
CHLORIDE BLD-SCNC: 104 MEQ/L (ref 98–111)
CHLORIDE BLD-SCNC: 104 MEQ/L (ref 98–111)
CHLORIDE BLD-SCNC: 106 MEQ/L (ref 98–111)
CHLORIDE BLD-SCNC: 94 MEQ/L (ref 98–111)
CHLORIDE BLD-SCNC: 96 MEQ/L (ref 98–111)
CHLORIDE BLD-SCNC: 98 MEQ/L (ref 98–111)
CHLORIDE BLD-SCNC: 98 MEQ/L (ref 98–111)
CHP ED QC CHECK: YES
CO2: 11 MEQ/L (ref 23–33)
CO2: 13 MEQ/L (ref 23–33)
CO2: 14 MEQ/L (ref 23–33)
CO2: 16 MEQ/L (ref 23–33)
COLLECTED BY:: ABNORMAL
COLOR: ABNORMAL
COLOR: YELLOW
CORTISOL COLLECTION INFO: NORMAL
CORTISOL: 14.19 UG/DL
CORTISOL: 36.3 UG/DL
CREAT SERPL-MCNC: 0.9 MG/DL (ref 0.4–1.2)
CREAT SERPL-MCNC: 1.1 MG/DL (ref 0.4–1.2)
CREAT SERPL-MCNC: 1.2 MG/DL (ref 0.4–1.2)
CREAT SERPL-MCNC: 2 MG/DL (ref 0.4–1.2)
CREAT SERPL-MCNC: 3.3 MG/DL (ref 0.4–1.2)
CREAT SERPL-MCNC: 3.3 MG/DL (ref 0.4–1.2)
CREAT SERPL-MCNC: 3.4 MG/DL (ref 0.4–1.2)
CREAT SERPL-MCNC: 3.4 MG/DL (ref 0.4–1.2)
CREAT SERPL-MCNC: 3.5 MG/DL (ref 0.4–1.2)
CRYSTALS, UA: ABNORMAL
CRYSTALS: ABNORMAL
DEVICE: ABNORMAL
DEVICE: ABNORMAL
EKG ATRIAL RATE: 86 BPM
EKG ATRIAL RATE: 88 BPM
EKG P AXIS: 78 DEGREES
EKG P AXIS: 95 DEGREES
EKG P-R INTERVAL: 196 MS
EKG P-R INTERVAL: 230 MS
EKG Q-T INTERVAL: 366 MS
EKG Q-T INTERVAL: 398 MS
EKG Q-T INTERVAL: 400 MS
EKG QRS DURATION: 140 MS
EKG QRS DURATION: 142 MS
EKG QRS DURATION: 144 MS
EKG QTC CALCULATION (BAZETT): 476 MS
EKG QTC CALCULATION (BAZETT): 484 MS
EKG QTC CALCULATION (BAZETT): 552 MS
EKG R AXIS: -152 DEGREES
EKG R AXIS: -34 DEGREES
EKG R AXIS: 98 DEGREES
EKG T AXIS: 39 DEGREES
EKG T AXIS: 5 DEGREES
EKG T AXIS: 54 DEGREES
EKG VENTRICULAR RATE: 137 BPM
EKG VENTRICULAR RATE: 86 BPM
EKG VENTRICULAR RATE: 88 BPM
EOSINOPHIL # BLD: 0 %
EOSINOPHIL # BLD: 0.4 %
EOSINOPHILS ABSOLUTE: 0 THOU/MM3 (ref 0–0.4)
EOSINOPHILS ABSOLUTE: 0.1 THOU/MM3 (ref 0–0.4)
EPITHELIAL CELLS, UA: ABNORMAL /HPF
EPITHELIAL CELLS, UA: ABNORMAL /HPF
ERYTHROCYTE [DISTWIDTH] IN BLOOD BY AUTOMATED COUNT: 15.7 % (ref 11.5–14.5)
ERYTHROCYTE [DISTWIDTH] IN BLOOD BY AUTOMATED COUNT: 15.7 % (ref 11.5–14.5)
ERYTHROCYTE [DISTWIDTH] IN BLOOD BY AUTOMATED COUNT: 15.9 % (ref 11.5–14.5)
ERYTHROCYTE [DISTWIDTH] IN BLOOD BY AUTOMATED COUNT: 56 FL (ref 35–45)
ERYTHROCYTE [DISTWIDTH] IN BLOOD BY AUTOMATED COUNT: 57.9 FL (ref 35–45)
ERYTHROCYTE [DISTWIDTH] IN BLOOD BY AUTOMATED COUNT: 59.1 FL (ref 35–45)
ERYTHROCYTE [DISTWIDTH] IN BLOOD BY AUTOMATED COUNT: 59.7 FL (ref 35–45)
ERYTHROCYTE [DISTWIDTH] IN BLOOD BY AUTOMATED COUNT: 60.5 FL (ref 35–45)
ERYTHROCYTE [DISTWIDTH] IN BLOOD BY AUTOMATED COUNT: 61 FL (ref 35–45)
GFR SERPL CREATININE-BSD FRML MDRD: 13 ML/MIN/1.73M2
GFR SERPL CREATININE-BSD FRML MDRD: 14 ML/MIN/1.73M2
GFR SERPL CREATININE-BSD FRML MDRD: 14 ML/MIN/1.73M2
GFR SERPL CREATININE-BSD FRML MDRD: 25 ML/MIN/1.73M2
GFR SERPL CREATININE-BSD FRML MDRD: 46 ML/MIN/1.73M2
GFR SERPL CREATININE-BSD FRML MDRD: 51 ML/MIN/1.73M2
GFR SERPL CREATININE-BSD FRML MDRD: > 60 ML/MIN/1.73M2
GLUCOSE BLD-MCNC: 114 MG/DL (ref 70–108)
GLUCOSE BLD-MCNC: 115 MG/DL
GLUCOSE BLD-MCNC: 115 MG/DL (ref 70–108)
GLUCOSE BLD-MCNC: 124 MG/DL (ref 70–108)
GLUCOSE BLD-MCNC: 136 MG/DL (ref 70–108)
GLUCOSE BLD-MCNC: 145 MG/DL (ref 70–108)
GLUCOSE BLD-MCNC: 194 MG/DL (ref 70–108)
GLUCOSE BLD-MCNC: 274 MG/DL (ref 70–108)
GLUCOSE BLD-MCNC: 277 MG/DL (ref 70–108)
GLUCOSE BLD-MCNC: 302 MG/DL (ref 70–108)
GLUCOSE BLD-MCNC: 347 MG/DL (ref 70–108)
GLUCOSE URINE: NEGATIVE MG/DL
GLUCOSE, URINE: NEGATIVE MG/DL
GLUCOSE, WHOLE BLOOD: 132 MG/DL (ref 70–108)
GLUCOSE, WHOLE BLOOD: 136 MG/DL (ref 70–108)
GLUCOSE, WHOLE BLOOD: 138 MG/DL (ref 70–108)
GLUCOSE, WHOLE BLOOD: 139 MG/DL (ref 70–108)
GLUCOSE, WHOLE BLOOD: 139 MG/DL (ref 70–108)
GLUCOSE, WHOLE BLOOD: 144 MG/DL (ref 70–108)
GLUCOSE, WHOLE BLOOD: 144 MG/DL (ref 70–108)
GLUCOSE, WHOLE BLOOD: 154 MG/DL (ref 70–108)
GLUCOSE, WHOLE BLOOD: 156 MG/DL (ref 70–108)
GLUCOSE, WHOLE BLOOD: 158 MG/DL (ref 70–108)
GLUCOSE, WHOLE BLOOD: 165 MG/DL (ref 70–108)
GLUCOSE, WHOLE BLOOD: 185 MG/DL (ref 70–108)
GLUCOSE, WHOLE BLOOD: 200 MG/DL (ref 70–108)
GLUCOSE, WHOLE BLOOD: 210 MG/DL (ref 70–108)
GLUCOSE, WHOLE BLOOD: 251 MG/DL (ref 70–108)
GLUCOSE, WHOLE BLOOD: 289 MG/DL (ref 70–108)
GLUCOSE, WHOLE BLOOD: 315 MG/DL (ref 70–108)
GLUCOSE, WHOLE BLOOD: 320 MG/DL (ref 70–108)
GLUCOSE, WHOLE BLOOD: 344 MG/DL (ref 70–108)
HBA1C MFR BLD: 4.6 % (ref 4.4–6.4)
HCO3, MIXED: 16 MMOL/L (ref 23–28)
HCO3, MIXED: 24 MMOL/L (ref 23–28)
HCO3: 12 MMOL/L (ref 23–28)
HCO3: 17 MMOL/L (ref 23–28)
HCO3: 17 MMOL/L (ref 23–28)
HCT VFR BLD CALC: 33.8 % (ref 37–47)
HCT VFR BLD CALC: 34.9 % (ref 37–47)
HCT VFR BLD CALC: 35.2 % (ref 37–47)
HCT VFR BLD CALC: 35.9 % (ref 37–47)
HCT VFR BLD CALC: 38 % (ref 37–47)
HCT VFR BLD CALC: 44 % (ref 37–47)
HCT VFR BLD CALC: 44.2 % (ref 37–47)
HEMOGLOBIN: 10 GM/DL (ref 12–16)
HEMOGLOBIN: 10.3 GM/DL (ref 12–16)
HEMOGLOBIN: 10.4 GM/DL (ref 12–16)
HEMOGLOBIN: 10.5 GM/DL (ref 12–16)
HEMOGLOBIN: 11 GM/DL (ref 12–16)
HEMOGLOBIN: 12.8 GM/DL (ref 12–16)
HEMOGLOBIN: 13.9 GM/DL (ref 12–16)
HYPOCHROMIA: PRESENT
ICTOTEST: NEGATIVE
IFIO2: 21
IFIO2: 3
IMMATURE GRANS (ABS): 0.06 THOU/MM3 (ref 0–0.07)
IMMATURE GRANS (ABS): 0.14 THOU/MM3 (ref 0–0.07)
IMMATURE GRANULOCYTES: 0.3 %
IMMATURE GRANULOCYTES: 0.7 %
INFLUENZA A: NOT DETECTED
INFLUENZA B: NOT DETECTED
INR BLD: 1.28 (ref 0.85–1.13)
INR BLD: 2.11 (ref 0.85–1.13)
KETONES, URINE: ABNORMAL
KETONES, URINE: ABNORMAL
LACTIC ACID, SEPSIS: 3.9 MMOL/L (ref 0.5–1.9)
LACTIC ACID, SEPSIS: 4.3 MMOL/L (ref 0.5–1.9)
LACTIC ACID: 10.6 MMOL/L (ref 0.5–2)
LACTIC ACID: 10.8 MMOL/L (ref 0.5–2)
LACTIC ACID: 11.5 MMOL/L (ref 0.5–2)
LACTIC ACID: 12 MMOL/L (ref 0.5–2)
LACTIC ACID: 13.2 MMOL/L (ref 0.5–2)
LACTIC ACID: 18 MMOL/L (ref 0.5–2)
LACTIC ACID: 4.1 MMOL/L (ref 0.5–2)
LACTIC ACID: 5.9 MMOL/L (ref 0.5–2)
LACTIC ACID: 8.8 MMOL/L (ref 0.5–2)
LEUKOCYTE EST, POC: ABNORMAL
LEUKOCYTE ESTERASE, URINE: ABNORMAL
LV EF: 23 %
LVEF MODALITY: NORMAL
LYMPHOCYTES # BLD: 1.7 %
LYMPHOCYTES # BLD: 3.3 %
LYMPHOCYTES ABSOLUTE: 0.3 THOU/MM3 (ref 1–4.8)
LYMPHOCYTES ABSOLUTE: 0.6 THOU/MM3 (ref 1–4.8)
MAGNESIUM: 2.5 MG/DL (ref 1.6–2.4)
MAGNESIUM: 2.7 MG/DL (ref 1.6–2.4)
MAGNESIUM: 2.7 MG/DL (ref 1.6–2.4)
MAGNESIUM: 3 MG/DL (ref 1.6–2.4)
MAGNESIUM: 3.3 MG/DL (ref 1.6–2.4)
MAGNESIUM: 3.9 MG/DL (ref 1.6–2.4)
MAGNESIUM: 4 MG/DL (ref 1.6–2.4)
MAGNESIUM: 4.1 MG/DL (ref 1.6–2.4)
MAGNESIUM: 4.5 MG/DL (ref 1.6–2.4)
MCH RBC QN AUTO: 29.3 PG (ref 26–33)
MCH RBC QN AUTO: 29.8 PG (ref 26–33)
MCH RBC QN AUTO: 29.8 PG (ref 26–33)
MCH RBC QN AUTO: 29.9 PG (ref 26–33)
MCH RBC QN AUTO: 30.4 PG (ref 26–33)
MCH RBC QN AUTO: 30.5 PG (ref 26–33)
MCHC RBC AUTO-ENTMCNC: 28.9 GM/DL (ref 32.2–35.5)
MCHC RBC AUTO-ENTMCNC: 29.2 GM/DL (ref 32.2–35.5)
MCHC RBC AUTO-ENTMCNC: 29.3 GM/DL (ref 32.2–35.5)
MCHC RBC AUTO-ENTMCNC: 29.6 GM/DL (ref 32.2–35.5)
MCHC RBC AUTO-ENTMCNC: 29.8 GM/DL (ref 32.2–35.5)
MCHC RBC AUTO-ENTMCNC: 31.6 GM/DL (ref 32.2–35.5)
MCV RBC AUTO: 100.3 FL (ref 81–99)
MCV RBC AUTO: 100.6 FL (ref 81–99)
MCV RBC AUTO: 102 FL (ref 81–99)
MCV RBC AUTO: 102 FL (ref 81–99)
MCV RBC AUTO: 103.3 FL (ref 81–99)
MCV RBC AUTO: 96.7 FL (ref 81–99)
MISCELLANEOUS 2: ABNORMAL
MISCELLANEOUS LAB TEST RESULT: ABNORMAL
MONOCYTES # BLD: 10.4 %
MONOCYTES # BLD: 11.2 %
MONOCYTES ABSOLUTE: 1.9 THOU/MM3 (ref 0.4–1.3)
MONOCYTES ABSOLUTE: 2 THOU/MM3 (ref 0.4–1.3)
MUCUS: ABNORMAL
NITRITE, URINE: NEGATIVE
NITRITE, URINE: NEGATIVE
NUCLEATED RED BLOOD CELLS: 0 /100 WBC
NUCLEATED RED BLOOD CELLS: 0 /100 WBC
O2 SAT, MIXED: 11 %
O2 SAT, MIXED: 68 %
O2 SATURATION: 93 %
O2 SATURATION: 95 %
O2 SATURATION: 96 %
ORGANISM: ABNORMAL
OSMOLALITY CALCULATION: 293.1 MOSMOL/KG (ref 275–300)
PCO2, MIXED VENOUS: 34 MMHG (ref 41–51)
PCO2, MIXED VENOUS: 58 MMHG (ref 41–51)
PCO2: 24 MMHG (ref 35–45)
PCO2: 25 MMHG (ref 35–45)
PCO2: 29 MMHG (ref 35–45)
PH BLOOD GAS: 7.27 (ref 7.35–7.45)
PH BLOOD GAS: 7.37 (ref 7.35–7.45)
PH BLOOD GAS: 7.46 (ref 7.35–7.45)
PH UA: 5.5 (ref 5–9)
PH UA: 6 (ref 5–9)
PH, MIXED: 7.23 (ref 7.31–7.41)
PH, MIXED: 7.28 (ref 7.31–7.41)
PHOSPHORUS: 1.7 MG/DL (ref 2.4–4.7)
PHOSPHORUS: 1.9 MG/DL (ref 2.4–4.7)
PHOSPHORUS: 2 MG/DL (ref 2.4–4.7)
PHOSPHORUS: 2.7 MG/DL (ref 2.4–4.7)
PHOSPHORUS: 3.5 MG/DL (ref 2.4–4.7)
PHOSPHORUS: 4.6 MG/DL (ref 2.4–4.7)
PHOSPHORUS: 4.7 MG/DL (ref 2.4–4.7)
PHOSPHORUS: 5.2 MG/DL (ref 2.4–4.7)
PLATELET # BLD: 117 THOU/MM3 (ref 130–400)
PLATELET # BLD: 151 THOU/MM3 (ref 130–400)
PLATELET # BLD: 168 THOU/MM3 (ref 130–400)
PLATELET # BLD: 204 THOU/MM3 (ref 130–400)
PLATELET # BLD: 64 THOU/MM3 (ref 130–400)
PLATELET # BLD: 81 THOU/MM3 (ref 130–400)
PMV BLD AUTO: 11.9 FL (ref 9.4–12.4)
PMV BLD AUTO: 11.9 FL (ref 9.4–12.4)
PMV BLD AUTO: 12.2 FL (ref 9.4–12.4)
PMV BLD AUTO: 12.4 FL (ref 9.4–12.4)
PMV BLD AUTO: 12.6 FL (ref 9.4–12.4)
PMV BLD AUTO: 12.6 FL (ref 9.4–12.4)
PO2 MIXED: 13 MMHG (ref 25–40)
PO2 MIXED: 39 MMHG (ref 25–40)
PO2: 62 MMHG (ref 71–104)
PO2: 75 MMHG (ref 71–104)
PO2: 93 MMHG (ref 71–104)
POTASSIUM SERPL-SCNC: 4.4 MEQ/L (ref 3.5–5.2)
POTASSIUM SERPL-SCNC: 4.6 MEQ/L (ref 3.5–5.2)
POTASSIUM SERPL-SCNC: 4.7 MEQ/L (ref 3.5–5.2)
POTASSIUM SERPL-SCNC: 4.8 MEQ/L (ref 3.5–5.2)
POTASSIUM SERPL-SCNC: 5.3 MEQ/L (ref 3.5–5.2)
POTASSIUM SERPL-SCNC: 5.8 MEQ/L (ref 3.5–5.2)
POTASSIUM SERPL-SCNC: 6.1 MEQ/L (ref 3.5–5.2)
PROCALCITONIN: 1.97 NG/ML (ref 0.01–0.09)
PROCALCITONIN: 2.36 NG/ML (ref 0.01–0.09)
PROTEIN UA: 300
PROTEIN UA: NEGATIVE MG/DL
RBC # BLD: 3.36 MILL/MM3 (ref 4.2–5.4)
RBC # BLD: 3.42 MILL/MM3 (ref 4.2–5.4)
RBC # BLD: 3.51 MILL/MM3 (ref 4.2–5.4)
RBC # BLD: 3.52 MILL/MM3 (ref 4.2–5.4)
RBC # BLD: 3.68 MILL/MM3 (ref 4.2–5.4)
RBC # BLD: 4.55 MILL/MM3 (ref 4.2–5.4)
RBC URINE: > 100 /HPF
RBC URINE: ABNORMAL /HPF
REASON FOR REJECTION: NORMAL
REJECTED TEST: NORMAL
RENAL EPITHELIAL, UA: ABNORMAL
RENAL EPITHELIAL, UA: ABNORMAL
SARS-COV-2 RNA, RT PCR: NOT DETECTED
SCAN OF BLOOD SMEAR: NORMAL
SEG NEUTROPHILS: 84.9 %
SEG NEUTROPHILS: 86.7 %
SEGMENTED NEUTROPHILS ABSOLUTE COUNT: 15.2 THOU/MM3 (ref 1.8–7.7)
SEGMENTED NEUTROPHILS ABSOLUTE COUNT: 15.9 THOU/MM3 (ref 1.8–7.7)
SITE: ABNORMAL
SODIUM BLD-SCNC: 133 MEQ/L (ref 135–145)
SODIUM BLD-SCNC: 134 MEQ/L (ref 135–145)
SODIUM BLD-SCNC: 137 MEQ/L (ref 135–145)
SODIUM BLD-SCNC: 137 MEQ/L (ref 135–145)
SODIUM BLD-SCNC: 138 MEQ/L (ref 135–145)
SODIUM BLD-SCNC: 139 MEQ/L (ref 135–145)
SODIUM BLD-SCNC: 140 MEQ/L (ref 135–145)
SODIUM BLD-SCNC: 141 MEQ/L (ref 135–145)
SODIUM BLD-SCNC: 141 MEQ/L (ref 135–145)
SOURCE, BLOOD GAS: ABNORMAL
SPECIFIC GRAVITY UA: 1.01 (ref 1–1.03)
SPECIFIC GRAVITY, URINE: 1.02 (ref 1–1.03)
TOTAL PROTEIN: 5.9 G/DL (ref 6.1–8)
TROPONIN T: 0.06 NG/ML
TROPONIN T: 0.08 NG/ML
TROPONIN T: 1.35 NG/ML
TSH SERPL DL<=0.05 MIU/L-ACNC: 1.01 UIU/ML (ref 0.4–4.2)
URINE CULTURE REFLEX: ABNORMAL
UROBILINOGEN, URINE: 0.2 EU/DL (ref 0–1)
UROBILINOGEN, URINE: 1 EU/DL (ref 0–1)
WBC # BLD: 12.8 THOU/MM3 (ref 4.8–10.8)
WBC # BLD: 13.5 THOU/MM3 (ref 4.8–10.8)
WBC # BLD: 14.3 THOU/MM3 (ref 4.8–10.8)
WBC # BLD: 14.6 THOU/MM3 (ref 4.8–10.8)
WBC # BLD: 17.5 THOU/MM3 (ref 4.8–10.8)
WBC # BLD: 18.7 THOU/MM3 (ref 4.8–10.8)
WBC UA: > 200 /HPF
WBC UA: ABNORMAL /HPF
YEAST: ABNORMAL
YEAST: ABNORMAL

## 2022-01-01 PROCEDURE — 6360000002 HC RX W HCPCS: Performed by: NURSE PRACTITIONER

## 2022-01-01 PROCEDURE — 6360000002 HC RX W HCPCS: Performed by: INTERNAL MEDICINE

## 2022-01-01 PROCEDURE — 6370000000 HC RX 637 (ALT 250 FOR IP): Performed by: STUDENT IN AN ORGANIZED HEALTH CARE EDUCATION/TRAINING PROGRAM

## 2022-01-01 PROCEDURE — 80053 COMPREHEN METABOLIC PANEL: CPT

## 2022-01-01 PROCEDURE — 2500000003 HC RX 250 WO HCPCS

## 2022-01-01 PROCEDURE — 84145 PROCALCITONIN (PCT): CPT

## 2022-01-01 PROCEDURE — 83735 ASSAY OF MAGNESIUM: CPT

## 2022-01-01 PROCEDURE — 6370000000 HC RX 637 (ALT 250 FOR IP): Performed by: NURSE PRACTITIONER

## 2022-01-01 PROCEDURE — 82330 ASSAY OF CALCIUM: CPT

## 2022-01-01 PROCEDURE — 85610 PROTHROMBIN TIME: CPT

## 2022-01-01 PROCEDURE — 82803 BLOOD GASES ANY COMBINATION: CPT

## 2022-01-01 PROCEDURE — 6360000002 HC RX W HCPCS: Performed by: PHYSICIAN ASSISTANT

## 2022-01-01 PROCEDURE — 37799 UNLISTED PX VASCULAR SURGERY: CPT

## 2022-01-01 PROCEDURE — 90945 DIALYSIS ONE EVALUATION: CPT

## 2022-01-01 PROCEDURE — 6360000002 HC RX W HCPCS

## 2022-01-01 PROCEDURE — 87186 SC STD MICRODIL/AGAR DIL: CPT

## 2022-01-01 PROCEDURE — 90945 DIALYSIS ONE EVALUATION: CPT | Performed by: INTERNAL MEDICINE

## 2022-01-01 PROCEDURE — C1887 CATHETER, GUIDING: HCPCS

## 2022-01-01 PROCEDURE — 2580000003 HC RX 258: Performed by: NURSE PRACTITIONER

## 2022-01-01 PROCEDURE — 76705 ECHO EXAM OF ABDOMEN: CPT

## 2022-01-01 PROCEDURE — 2580000003 HC RX 258: Performed by: INTERNAL MEDICINE

## 2022-01-01 PROCEDURE — 85018 HEMOGLOBIN: CPT

## 2022-01-01 PROCEDURE — 36556 INSERT NON-TUNNEL CV CATH: CPT

## 2022-01-01 PROCEDURE — C1760 CLOSURE DEV, VASC: HCPCS

## 2022-01-01 PROCEDURE — 2000000000 HC ICU R&B

## 2022-01-01 PROCEDURE — 36415 COLL VENOUS BLD VENIPUNCTURE: CPT

## 2022-01-01 PROCEDURE — 82533 TOTAL CORTISOL: CPT

## 2022-01-01 PROCEDURE — 85027 COMPLETE CBC AUTOMATED: CPT

## 2022-01-01 PROCEDURE — 74176 CT ABD & PELVIS W/O CONTRAST: CPT

## 2022-01-01 PROCEDURE — 84484 ASSAY OF TROPONIN QUANT: CPT

## 2022-01-01 PROCEDURE — 87040 BLOOD CULTURE FOR BACTERIA: CPT

## 2022-01-01 PROCEDURE — 36592 COLLECT BLOOD FROM PICC: CPT

## 2022-01-01 PROCEDURE — 87636 SARSCOV2 & INF A&B AMP PRB: CPT

## 2022-01-01 PROCEDURE — 99233 SBSQ HOSP IP/OBS HIGH 50: CPT | Performed by: INTERNAL MEDICINE

## 2022-01-01 PROCEDURE — B2131ZZ FLUOROSCOPY OF MULTIPLE CORONARY ARTERY BYPASS GRAFTS USING LOW OSMOLAR CONTRAST: ICD-10-PCS | Performed by: INTERNAL MEDICINE

## 2022-01-01 PROCEDURE — 83605 ASSAY OF LACTIC ACID: CPT

## 2022-01-01 PROCEDURE — 85730 THROMBOPLASTIN TIME PARTIAL: CPT

## 2022-01-01 PROCEDURE — 99223 1ST HOSP IP/OBS HIGH 75: CPT | Performed by: INTERNAL MEDICINE

## 2022-01-01 PROCEDURE — C1769 GUIDE WIRE: HCPCS

## 2022-01-01 PROCEDURE — 2500000003 HC RX 250 WO HCPCS: Performed by: NURSE PRACTITIONER

## 2022-01-01 PROCEDURE — 6360000004 HC RX CONTRAST MEDICATION: Performed by: INTERNAL MEDICINE

## 2022-01-01 PROCEDURE — 96361 HYDRATE IV INFUSION ADD-ON: CPT

## 2022-01-01 PROCEDURE — 85025 COMPLETE CBC W/AUTO DIFF WBC: CPT

## 2022-01-01 PROCEDURE — 99285 EMERGENCY DEPT VISIT HI MDM: CPT

## 2022-01-01 PROCEDURE — 6360000002 HC RX W HCPCS: Performed by: STUDENT IN AN ORGANIZED HEALTH CARE EDUCATION/TRAINING PROGRAM

## 2022-01-01 PROCEDURE — 2500000003 HC RX 250 WO HCPCS: Performed by: PHYSICIAN ASSISTANT

## 2022-01-01 PROCEDURE — 87077 CULTURE AEROBIC IDENTIFY: CPT

## 2022-01-01 PROCEDURE — 93005 ELECTROCARDIOGRAM TRACING: CPT | Performed by: EMERGENCY MEDICINE

## 2022-01-01 PROCEDURE — 93459 L HRT ART/GRFT ANGIO: CPT

## 2022-01-01 PROCEDURE — 93005 ELECTROCARDIOGRAM TRACING: CPT | Performed by: NURSE PRACTITIONER

## 2022-01-01 PROCEDURE — 93306 TTE W/DOPPLER COMPLETE: CPT

## 2022-01-01 PROCEDURE — 2500000003 HC RX 250 WO HCPCS: Performed by: INTERNAL MEDICINE

## 2022-01-01 PROCEDURE — 82948 REAGENT STRIP/BLOOD GLUCOSE: CPT

## 2022-01-01 PROCEDURE — 85014 HEMATOCRIT: CPT

## 2022-01-01 PROCEDURE — 84100 ASSAY OF PHOSPHORUS: CPT

## 2022-01-01 PROCEDURE — 36620 INSERTION CATHETER ARTERY: CPT | Performed by: NURSE PRACTITIONER

## 2022-01-01 PROCEDURE — B2111ZZ FLUOROSCOPY OF MULTIPLE CORONARY ARTERIES USING LOW OSMOLAR CONTRAST: ICD-10-PCS | Performed by: INTERNAL MEDICINE

## 2022-01-01 PROCEDURE — 76770 US EXAM ABDO BACK WALL COMP: CPT

## 2022-01-01 PROCEDURE — 99291 CRITICAL CARE FIRST HOUR: CPT | Performed by: INTERNAL MEDICINE

## 2022-01-01 PROCEDURE — 02HV33Z INSERTION OF INFUSION DEVICE INTO SUPERIOR VENA CAVA, PERCUTANEOUS APPROACH: ICD-10-PCS | Performed by: EMERGENCY MEDICINE

## 2022-01-01 PROCEDURE — 72170 X-RAY EXAM OF PELVIS: CPT

## 2022-01-01 PROCEDURE — 51702 INSERT TEMP BLADDER CATH: CPT

## 2022-01-01 PROCEDURE — 87086 URINE CULTURE/COLONY COUNT: CPT

## 2022-01-01 PROCEDURE — 81001 URINALYSIS AUTO W/SCOPE: CPT

## 2022-01-01 PROCEDURE — 93459 L HRT ART/GRFT ANGIO: CPT | Performed by: INTERNAL MEDICINE

## 2022-01-01 PROCEDURE — 71045 X-RAY EXAM CHEST 1 VIEW: CPT

## 2022-01-01 PROCEDURE — 93010 ELECTROCARDIOGRAM REPORT: CPT | Performed by: INTERNAL MEDICINE

## 2022-01-01 PROCEDURE — 2580000003 HC RX 258: Performed by: STUDENT IN AN ORGANIZED HEALTH CARE EDUCATION/TRAINING PROGRAM

## 2022-01-01 PROCEDURE — 96365 THER/PROPH/DIAG IV INF INIT: CPT

## 2022-01-01 PROCEDURE — B2181ZZ FLUOROSCOPY OF LEFT INTERNAL MAMMARY BYPASS GRAFT USING LOW OSMOLAR CONTRAST: ICD-10-PCS | Performed by: INTERNAL MEDICINE

## 2022-01-01 PROCEDURE — 2580000003 HC RX 258: Performed by: PHYSICIAN ASSISTANT

## 2022-01-01 PROCEDURE — 82947 ASSAY GLUCOSE BLOOD QUANT: CPT

## 2022-01-01 PROCEDURE — 36620 INSERTION CATHETER ARTERY: CPT

## 2022-01-01 PROCEDURE — 80048 BASIC METABOLIC PNL TOTAL CA: CPT

## 2022-01-01 PROCEDURE — P9047 ALBUMIN (HUMAN), 25%, 50ML: HCPCS | Performed by: NURSE PRACTITIONER

## 2022-01-01 PROCEDURE — 70450 CT HEAD/BRAIN W/O DYE: CPT

## 2022-01-01 PROCEDURE — 83036 HEMOGLOBIN GLYCOSYLATED A1C: CPT

## 2022-01-01 PROCEDURE — 96367 TX/PROPH/DG ADDL SEQ IV INF: CPT

## 2022-01-01 PROCEDURE — 84443 ASSAY THYROID STIM HORMONE: CPT

## 2022-01-01 RX ORDER — ONDANSETRON 4 MG/1
4 TABLET, ORALLY DISINTEGRATING ORAL EVERY 8 HOURS PRN
Status: DISCONTINUED | OUTPATIENT
Start: 2022-01-01 | End: 2022-01-01 | Stop reason: HOSPADM

## 2022-01-01 RX ORDER — SODIUM CHLORIDE 9 MG/ML
INJECTION, SOLUTION INTRAVENOUS PRN
Status: DISCONTINUED | OUTPATIENT
Start: 2022-01-01 | End: 2022-01-01 | Stop reason: HOSPADM

## 2022-01-01 RX ORDER — CALCIUM GLUCONATE 10 MG/ML
1000 INJECTION, SOLUTION INTRAVENOUS PRN
Status: DISCONTINUED | OUTPATIENT
Start: 2022-01-01 | End: 2022-01-01

## 2022-01-01 RX ORDER — POTASSIUM CHLORIDE 29.8 MG/ML
20 INJECTION INTRAVENOUS PRN
Status: DISCONTINUED | OUTPATIENT
Start: 2022-01-01 | End: 2022-01-01

## 2022-01-01 RX ORDER — HEPARIN SODIUM 1000 [USP'U]/ML
2000 INJECTION, SOLUTION INTRAVENOUS; SUBCUTANEOUS PRN
Status: DISCONTINUED | OUTPATIENT
Start: 2022-01-01 | End: 2022-01-01 | Stop reason: HOSPADM

## 2022-01-01 RX ORDER — MORPHINE SULFATE 2 MG/ML
INJECTION, SOLUTION INTRAMUSCULAR; INTRAVENOUS
Status: COMPLETED
Start: 2022-01-01 | End: 2022-01-01

## 2022-01-01 RX ORDER — ALBUMIN (HUMAN) 12.5 G/50ML
25 SOLUTION INTRAVENOUS EVERY 6 HOURS
Status: DISCONTINUED | OUTPATIENT
Start: 2022-01-01 | End: 2022-01-01

## 2022-01-01 RX ORDER — SODIUM CHLORIDE 0.9 % (FLUSH) 0.9 %
5-40 SYRINGE (ML) INJECTION PRN
Status: DISCONTINUED | OUTPATIENT
Start: 2022-01-01 | End: 2022-01-01 | Stop reason: HOSPADM

## 2022-01-01 RX ORDER — SODIUM CHLORIDE 9 MG/ML
INJECTION, SOLUTION INTRAVENOUS CONTINUOUS
Status: DISCONTINUED | OUTPATIENT
Start: 2022-01-01 | End: 2022-01-01

## 2022-01-01 RX ORDER — ACETAMINOPHEN 325 MG/1
650 TABLET ORAL EVERY 4 HOURS PRN
Status: DISCONTINUED | OUTPATIENT
Start: 2022-01-01 | End: 2022-01-01 | Stop reason: HOSPADM

## 2022-01-01 RX ORDER — SODIUM CHLORIDE, SODIUM LACTATE, POTASSIUM CHLORIDE, CALCIUM CHLORIDE 600; 310; 30; 20 MG/100ML; MG/100ML; MG/100ML; MG/100ML
INJECTION, SOLUTION INTRAVENOUS CONTINUOUS
Status: DISCONTINUED | OUTPATIENT
Start: 2022-01-01 | End: 2022-01-01

## 2022-01-01 RX ORDER — SODIUM CHLORIDE 0.9 % (FLUSH) 0.9 %
5-40 SYRINGE (ML) INJECTION EVERY 12 HOURS SCHEDULED
Status: DISCONTINUED | OUTPATIENT
Start: 2022-01-01 | End: 2022-01-01 | Stop reason: HOSPADM

## 2022-01-01 RX ORDER — BUPROPION HYDROCHLORIDE 150 MG/1
150 TABLET ORAL EVERY MORNING
COMMUNITY

## 2022-01-01 RX ORDER — DULAGLUTIDE 1.5 MG/.5ML
1.5 INJECTION, SOLUTION SUBCUTANEOUS WEEKLY
COMMUNITY

## 2022-01-01 RX ORDER — 0.9 % SODIUM CHLORIDE 0.9 %
30 INTRAVENOUS SOLUTION INTRAVENOUS ONCE
Status: COMPLETED | OUTPATIENT
Start: 2022-01-01 | End: 2022-01-01

## 2022-01-01 RX ORDER — DEXAMETHASONE SODIUM PHOSPHATE 4 MG/ML
10 INJECTION, SOLUTION INTRA-ARTICULAR; INTRALESIONAL; INTRAMUSCULAR; INTRAVENOUS; SOFT TISSUE ONCE
Status: COMPLETED | OUTPATIENT
Start: 2022-01-01 | End: 2022-01-01

## 2022-01-01 RX ORDER — HEPARIN SODIUM 1000 [USP'U]/ML
4000 INJECTION, SOLUTION INTRAVENOUS; SUBCUTANEOUS PRN
Status: DISCONTINUED | OUTPATIENT
Start: 2022-01-01 | End: 2022-01-01 | Stop reason: HOSPADM

## 2022-01-01 RX ORDER — HEPARIN SODIUM 1000 [USP'U]/ML
60 INJECTION, SOLUTION INTRAVENOUS; SUBCUTANEOUS ONCE
Status: DISCONTINUED | OUTPATIENT
Start: 2022-01-01 | End: 2022-01-01 | Stop reason: ALTCHOICE

## 2022-01-01 RX ORDER — SODIUM CHLORIDE 0.9 % (FLUSH) 0.9 %
5-40 SYRINGE (ML) INJECTION PRN
Status: DISCONTINUED | OUTPATIENT
Start: 2022-01-01 | End: 2022-01-01 | Stop reason: SDUPTHER

## 2022-01-01 RX ORDER — MORPHINE SULFATE 2 MG/ML
2 INJECTION, SOLUTION INTRAMUSCULAR; INTRAVENOUS ONCE
Status: COMPLETED | OUTPATIENT
Start: 2022-01-01 | End: 2022-01-01

## 2022-01-01 RX ORDER — DIVALPROEX SODIUM 125 MG/1
125 CAPSULE, COATED PELLETS ORAL 2 TIMES DAILY
COMMUNITY

## 2022-01-01 RX ORDER — HEPARIN SODIUM 10000 [USP'U]/100ML
5-30 INJECTION, SOLUTION INTRAVENOUS CONTINUOUS
Status: DISCONTINUED | OUTPATIENT
Start: 2022-01-01 | End: 2022-01-01 | Stop reason: HOSPADM

## 2022-01-01 RX ORDER — MORPHINE SULFATE 2 MG/ML
2 INJECTION, SOLUTION INTRAMUSCULAR; INTRAVENOUS EVERY 4 HOURS PRN
Status: DISCONTINUED | OUTPATIENT
Start: 2022-01-01 | End: 2022-01-01 | Stop reason: HOSPADM

## 2022-01-01 RX ORDER — ACETAMINOPHEN 650 MG/1
650 SUPPOSITORY RECTAL EVERY 6 HOURS PRN
Status: DISCONTINUED | OUTPATIENT
Start: 2022-01-01 | End: 2022-01-01 | Stop reason: HOSPADM

## 2022-01-01 RX ORDER — NYSTATIN 10B UNIT
POWDER (EA) MISCELLANEOUS
COMMUNITY

## 2022-01-01 RX ORDER — DEXTROSE MONOHYDRATE 100 MG/ML
INJECTION, SOLUTION INTRAVENOUS CONTINUOUS PRN
Status: DISCONTINUED | OUTPATIENT
Start: 2022-01-01 | End: 2022-01-01 | Stop reason: HOSPADM

## 2022-01-01 RX ORDER — ONDANSETRON 2 MG/ML
4 INJECTION INTRAMUSCULAR; INTRAVENOUS EVERY 6 HOURS PRN
Status: DISCONTINUED | OUTPATIENT
Start: 2022-01-01 | End: 2022-01-01 | Stop reason: HOSPADM

## 2022-01-01 RX ORDER — INSULIN DETEMIR 100 [IU]/ML
20 INJECTION, SOLUTION SUBCUTANEOUS EVERY MORNING
COMMUNITY

## 2022-01-01 RX ORDER — LISINOPRIL 20 MG/1
20 TABLET ORAL EVERY MORNING
COMMUNITY

## 2022-01-01 RX ORDER — CALCIUM GLUCONATE 10 MG/ML
1000 INJECTION, SOLUTION INTRAVENOUS ONCE
Status: COMPLETED | OUTPATIENT
Start: 2022-01-01 | End: 2022-01-01

## 2022-01-01 RX ORDER — CALCIUM GLUCONATE 20 MG/ML
2000 INJECTION, SOLUTION INTRAVENOUS PRN
Status: DISCONTINUED | OUTPATIENT
Start: 2022-01-01 | End: 2022-01-01

## 2022-01-01 RX ORDER — LORAZEPAM 2 MG/ML
2 INJECTION INTRAMUSCULAR ONCE
Status: DISCONTINUED | OUTPATIENT
Start: 2022-01-01 | End: 2022-01-01

## 2022-01-01 RX ORDER — HEPARIN SODIUM 5000 [USP'U]/ML
5000 INJECTION, SOLUTION INTRAVENOUS; SUBCUTANEOUS EVERY 8 HOURS SCHEDULED
Status: DISCONTINUED | OUTPATIENT
Start: 2022-01-01 | End: 2022-01-01

## 2022-01-01 RX ORDER — NOREPINEPHRINE BIT/0.9 % NACL 16MG/250ML
1-100 INFUSION BOTTLE (ML) INTRAVENOUS CONTINUOUS
Status: DISCONTINUED | OUTPATIENT
Start: 2022-01-01 | End: 2022-01-01 | Stop reason: HOSPADM

## 2022-01-01 RX ORDER — MAGNESIUM SULFATE 1 G/100ML
1000 INJECTION INTRAVENOUS PRN
Status: DISCONTINUED | OUTPATIENT
Start: 2022-01-01 | End: 2022-01-01

## 2022-01-01 RX ORDER — SODIUM CHLORIDE 0.9 % (FLUSH) 0.9 %
5-40 SYRINGE (ML) INJECTION EVERY 12 HOURS SCHEDULED
Status: DISCONTINUED | OUTPATIENT
Start: 2022-01-01 | End: 2022-01-01 | Stop reason: SDUPTHER

## 2022-01-01 RX ORDER — ASPIRIN 300 MG/1
300 SUPPOSITORY RECTAL ONCE
Status: DISCONTINUED | OUTPATIENT
Start: 2022-01-01 | End: 2022-01-01

## 2022-01-01 RX ORDER — FUROSEMIDE 40 MG/1
40 TABLET ORAL 3 TIMES DAILY
COMMUNITY

## 2022-01-01 RX ORDER — SODIUM CHLORIDE 9 MG/ML
INJECTION, SOLUTION INTRAVENOUS PRN
Status: DISCONTINUED | OUTPATIENT
Start: 2022-01-01 | End: 2022-01-01 | Stop reason: SDUPTHER

## 2022-01-01 RX ORDER — QUETIAPINE FUMARATE 25 MG/1
25 TABLET, FILM COATED ORAL 2 TIMES DAILY
COMMUNITY

## 2022-01-01 RX ORDER — ACETAMINOPHEN 325 MG/1
650 TABLET ORAL EVERY 6 HOURS PRN
Status: DISCONTINUED | OUTPATIENT
Start: 2022-01-01 | End: 2022-01-01 | Stop reason: SDUPTHER

## 2022-01-01 RX ORDER — POLYETHYLENE GLYCOL 3350 17 G/17G
17 POWDER, FOR SOLUTION ORAL DAILY PRN
Status: DISCONTINUED | OUTPATIENT
Start: 2022-01-01 | End: 2022-01-01 | Stop reason: HOSPADM

## 2022-01-01 RX ORDER — 0.9 % SODIUM CHLORIDE 0.9 %
1000 INTRAVENOUS SOLUTION INTRAVENOUS
Status: ACTIVE | OUTPATIENT
Start: 2022-01-01 | End: 2022-01-01

## 2022-01-01 RX ADMIN — Medication: at 22:46

## 2022-01-01 RX ADMIN — CALCIUM GLUCONATE 1000 MG: 10 INJECTION, SOLUTION INTRAVENOUS at 08:40

## 2022-01-01 RX ADMIN — SODIUM BICARBONATE: 84 INJECTION, SOLUTION INTRAVENOUS at 13:16

## 2022-01-01 RX ADMIN — SODIUM CHLORIDE, PRESERVATIVE FREE 10 ML: 5 INJECTION INTRAVENOUS at 22:39

## 2022-01-01 RX ADMIN — Medication 50 MCG/MIN: at 09:47

## 2022-01-01 RX ADMIN — AMIODARONE HYDROCHLORIDE 0.5 MG/MIN: 1.8 INJECTION, SOLUTION INTRAVENOUS at 04:43

## 2022-01-01 RX ADMIN — Medication: at 05:38

## 2022-01-01 RX ADMIN — DEXAMETHASONE SODIUM PHOSPHATE 10 MG: 4 INJECTION, SOLUTION INTRA-ARTICULAR; INTRALESIONAL; INTRAMUSCULAR; INTRAVENOUS; SOFT TISSUE at 03:52

## 2022-01-01 RX ADMIN — SODIUM CHLORIDE 7.6 UNITS/HR: 9 INJECTION, SOLUTION INTRAVENOUS at 23:36

## 2022-01-01 RX ADMIN — Medication: at 00:23

## 2022-01-01 RX ADMIN — Medication 60 MCG/MIN: at 14:43

## 2022-01-01 RX ADMIN — HEPARIN SODIUM 5000 UNITS: 5000 INJECTION INTRAVENOUS; SUBCUTANEOUS at 22:30

## 2022-01-01 RX ADMIN — SODIUM BICARBONATE: 84 INJECTION, SOLUTION INTRAVENOUS at 09:28

## 2022-01-01 RX ADMIN — DEXTROSE MONOHYDRATE 125 ML: 100 INJECTION, SOLUTION INTRAVENOUS at 08:45

## 2022-01-01 RX ADMIN — SODIUM BICARBONATE: 84 INJECTION, SOLUTION INTRAVENOUS at 05:29

## 2022-01-01 RX ADMIN — PHENYLEPHRINE HYDROCHLORIDE 175 MCG/MIN: 10 INJECTION INTRAVENOUS at 02:57

## 2022-01-01 RX ADMIN — SODIUM BICARBONATE: 84 INJECTION, SOLUTION INTRAVENOUS at 00:22

## 2022-01-01 RX ADMIN — ALBUMIN (HUMAN) 25 G: 0.25 INJECTION, SOLUTION INTRAVENOUS at 13:58

## 2022-01-01 RX ADMIN — Medication: at 12:31

## 2022-01-01 RX ADMIN — CALCIUM GLUCONATE 1000 MG: 10 INJECTION, SOLUTION INTRAVENOUS at 13:16

## 2022-01-01 RX ADMIN — Medication 1 MCG/MIN: at 01:05

## 2022-01-01 RX ADMIN — Medication 60 MCG/MIN: at 10:23

## 2022-01-01 RX ADMIN — IOPAMIDOL 60 ML: 755 INJECTION, SOLUTION INTRAVENOUS at 07:57

## 2022-01-01 RX ADMIN — HEPARIN SODIUM 5000 UNITS: 5000 INJECTION INTRAVENOUS; SUBCUTANEOUS at 06:30

## 2022-01-01 RX ADMIN — VASOPRESSIN 0.04 UNITS/MIN: 20 INJECTION, SOLUTION INTRAMUSCULAR; SUBCUTANEOUS at 13:31

## 2022-01-01 RX ADMIN — CALCIUM GLUCONATE 2000 MG: 20 INJECTION, SOLUTION INTRAVENOUS at 18:43

## 2022-01-01 RX ADMIN — Medication: at 12:27

## 2022-01-01 RX ADMIN — VASOPRESSIN 0.04 UNITS/MIN: 20 INJECTION, SOLUTION INTRAMUSCULAR; SUBCUTANEOUS at 21:37

## 2022-01-01 RX ADMIN — CEFTRIAXONE 2000 MG: 2 INJECTION, POWDER, FOR SOLUTION INTRAMUSCULAR; INTRAVENOUS at 00:09

## 2022-01-01 RX ADMIN — Medication: at 02:55

## 2022-01-01 RX ADMIN — ALBUMIN (HUMAN) 25 G: 0.25 INJECTION, SOLUTION INTRAVENOUS at 05:10

## 2022-01-01 RX ADMIN — Medication: at 17:36

## 2022-01-01 RX ADMIN — MORPHINE SULFATE 2 MG: 2 INJECTION, SOLUTION INTRAMUSCULAR; INTRAVENOUS at 12:37

## 2022-01-01 RX ADMIN — Medication: at 19:02

## 2022-01-01 RX ADMIN — SODIUM BICARBONATE 100 MEQ: 84 INJECTION INTRAVENOUS at 10:43

## 2022-01-01 RX ADMIN — AMIODARONE HYDROCHLORIDE 1 MG/MIN: 1.8 INJECTION, SOLUTION INTRAVENOUS at 09:35

## 2022-01-01 RX ADMIN — Medication 58 MCG/MIN: at 18:46

## 2022-01-01 RX ADMIN — HEPARIN SODIUM 9 UNITS/KG/HR: 10000 INJECTION, SOLUTION INTRAVENOUS at 08:39

## 2022-01-01 RX ADMIN — Medication: at 12:29

## 2022-01-01 RX ADMIN — Medication: at 21:37

## 2022-01-01 RX ADMIN — INSULIN HUMAN 10 UNITS: 100 INJECTION, SOLUTION PARENTERAL at 08:45

## 2022-01-01 RX ADMIN — AMIODARONE HYDROCHLORIDE 0.5 MG/MIN: 1.8 INJECTION, SOLUTION INTRAVENOUS at 16:03

## 2022-01-01 RX ADMIN — Medication: at 10:19

## 2022-01-01 RX ADMIN — PHENYLEPHRINE HYDROCHLORIDE 50 MCG/MIN: 10 INJECTION INTRAVENOUS at 14:23

## 2022-01-01 RX ADMIN — Medication 100 MCG/MIN: at 00:23

## 2022-01-01 RX ADMIN — Medication 100 MEQ: at 10:43

## 2022-01-01 RX ADMIN — SODIUM BICARBONATE 50 MEQ: 84 INJECTION INTRAVENOUS at 00:10

## 2022-01-01 RX ADMIN — Medication: at 16:08

## 2022-01-01 RX ADMIN — MORPHINE SULFATE 2 MG: 2 INJECTION, SOLUTION INTRAMUSCULAR; INTRAVENOUS at 09:35

## 2022-01-01 RX ADMIN — Medication: at 04:02

## 2022-01-01 RX ADMIN — CALCIUM GLUCONATE 2000 MG: 20 INJECTION, SOLUTION INTRAVENOUS at 06:38

## 2022-01-01 RX ADMIN — SODIUM CHLORIDE, PRESERVATIVE FREE 10 ML: 5 INJECTION INTRAVENOUS at 08:41

## 2022-01-01 RX ADMIN — SODIUM BICARBONATE: 84 INJECTION, SOLUTION INTRAVENOUS at 04:44

## 2022-01-01 RX ADMIN — Medication: at 07:50

## 2022-01-01 RX ADMIN — Medication: at 09:05

## 2022-01-01 RX ADMIN — Medication 80 MCG/MIN: at 06:32

## 2022-01-01 RX ADMIN — CALCIUM GLUCONATE 2000 MG: 20 INJECTION, SOLUTION INTRAVENOUS at 00:42

## 2022-01-01 RX ADMIN — DEXTROSE MONOHYDRATE 250 ML: 100 INJECTION, SOLUTION INTRAVENOUS at 08:15

## 2022-01-01 RX ADMIN — AMIODARONE HYDROCHLORIDE 150 MG: 1.5 INJECTION, SOLUTION INTRAVENOUS at 09:24

## 2022-01-01 RX ADMIN — Medication: at 22:45

## 2022-01-01 RX ADMIN — VASOPRESSIN 0.03 UNITS/MIN: 20 INJECTION, SOLUTION INTRAMUSCULAR; SUBCUTANEOUS at 05:16

## 2022-01-01 RX ADMIN — SODIUM BICARBONATE: 84 INJECTION, SOLUTION INTRAVENOUS at 18:31

## 2022-01-01 RX ADMIN — CALCIUM GLUCONATE 1000 MG: 10 INJECTION, SOLUTION INTRAVENOUS at 08:57

## 2022-01-01 RX ADMIN — SODIUM PHOSPHATE, MONOBASIC, MONOHYDRATE AND SODIUM PHOSPHATE, DIBASIC, ANHYDROUS 12 MMOL: 276; 142 INJECTION, SOLUTION INTRAVENOUS at 11:14

## 2022-01-01 RX ADMIN — Medication 100 MCG/MIN: at 02:56

## 2022-01-01 RX ADMIN — Medication: at 09:08

## 2022-01-01 RX ADMIN — SODIUM CHLORIDE 3102 ML: 9 INJECTION, SOLUTION INTRAVENOUS at 19:26

## 2022-01-01 RX ADMIN — SODIUM CHLORIDE 5.7 UNITS/HR: 9 INJECTION, SOLUTION INTRAVENOUS at 14:40

## 2022-01-01 RX ADMIN — SODIUM CHLORIDE, POTASSIUM CHLORIDE, SODIUM LACTATE AND CALCIUM CHLORIDE: 600; 310; 30; 20 INJECTION, SOLUTION INTRAVENOUS at 05:08

## 2022-01-01 RX ADMIN — SODIUM CHLORIDE, PRESERVATIVE FREE 10 ML: 5 INJECTION INTRAVENOUS at 09:02

## 2022-01-01 RX ADMIN — Medication: at 17:39

## 2022-01-01 RX ADMIN — DEXAMETHASONE SODIUM PHOSPHATE 10 MG: 4 INJECTION, SOLUTION INTRA-ARTICULAR; INTRALESIONAL; INTRAMUSCULAR; INTRAVENOUS; SOFT TISSUE at 13:02

## 2022-01-01 RX ADMIN — Medication 5 MCG/MIN: at 01:38

## 2022-01-01 RX ADMIN — HEPARIN SODIUM 5000 UNITS: 5000 INJECTION INTRAVENOUS; SUBCUTANEOUS at 15:23

## 2022-01-01 ASSESSMENT — PAIN SCALES - WONG BAKER

## 2022-01-01 ASSESSMENT — ENCOUNTER SYMPTOMS
SHORTNESS OF BREATH: 0
VOMITING: 0
DIARRHEA: 0
COUGH: 0
RHINORRHEA: 0

## 2022-01-01 ASSESSMENT — PAIN - FUNCTIONAL ASSESSMENT
PAIN_FUNCTIONAL_ASSESSMENT: NONE - DENIES PAIN

## 2022-10-25 NOTE — ED NOTES
Pt to rm 02 per EMS- sent in per LOVE CRAWLEY II.ASHOK Con after pt started to be more lethargic over the last two days. Reported that pt has been declining recently, not eating/drinking well. On arrival pt wakes only to calling her name loudly and shaking her arm- pt will open her eyes but doesn't not stay awake long and will not answer most questions. She did recognize her  at bedside but that was it, otherwise pt answers with 'I don't know\". Pt noted incontinent of stool on arrival as well- large loose BM cleaned up. Pt yelled/screamed the whole time.  states he hasn't been able to get her to respond to him and she refuses to [address] and refuses her home medications.       Shorty Davila RN  10/25/22 5104

## 2022-10-25 NOTE — ED PROVIDER NOTES
[facility] [address] [address] EMERGENCY DEPT      CHIEF COMPLAINT       Chief Complaint   Patient presents with    Fatigue     ECF states pt declining over the last few weeks       Nurses Notes reviewed and I agree except as noted in the HPI. HISTORY OF PRESENT ILLNESS    Tianna Reilly is a [de-identified] y.o. female who presents for lethargy. Patient has had a decline in her health over the past few weeks to months. Spouse reports she has not been eating or taking her medicines. Patient has become increasingly weaker. Since yesterday patient has become lethargic and responds only to loud voice or pain. At the nursing home today they were unable to obtain blood pressure prompting a call to EMS. EMS obtained a blood pressure of 67/56 and a pulse ox of 90%. Patient states \"I do not feel good. \"  Patient cannot elaborate any further. Eliquis is noted in patient's medication list.  Spouse reports no known falls. Patient is a DNR CCA. REVIEW OF SYSTEMS     Review of Systems   Unable to perform ROS: Mental status change   Constitutional:  Positive for activity change, appetite change and fatigue. Negative for chills and fever. HENT:  Negative for congestion and rhinorrhea. Respiratory:  Negative for cough and shortness of breath. Cardiovascular:  Negative for chest pain. Gastrointestinal:  Negative for diarrhea and vomiting. Psychiatric/Behavioral:  Positive for confusion. PAST MEDICAL HISTORY    has a past medical history of Arthritis, CAD (coronary artery disease), CKD stage 2 due to type 2 diabetes mellitus ([address] DM (diabetes mellitus) ([facility] [address] DM retinopathy ([facility] [address] HTN (hypertension), Hyperlipidemia, Morbid obesity ([facility] [address] OAB (overactive bladder), and UTI (urinary tract infection). SURGICAL HISTORY      has a past surgical history that includes tumor removal (1994); Hysterectomy (1994); Coronary artery bypass graft (1995); Knee arthroscopy (Right, ?);  Total knee arthroplasty (Right); and pr lap, radical nephrectomy (Left, 3/21/2018). CURRENT MEDICATIONS       Current Discharge Medication List        CONTINUE these medications which have NOT CHANGED    Details   apixaban (ELIQUIS) 5 MG TABS tablet Take 1 tablet by mouth 2 times daily  Qty: 60 tablet, Refills: 0      insulin glargine (LANTUS) 100 UNIT/ML injection vial Inject 15 Units into the skin nightly  Qty: 1 vial, Refills: 0      metoprolol succinate (TOPROL XL) 25 MG extended release tablet Take 1 tablet by mouth daily  Qty: 30 tablet, Refills: 0      donepezil (ARICEPT) 5 MG tablet Take 5 mg by mouth nightly      polyethylene glycol (GLYCOLAX) packet Take 17 g by mouth daily      bisacodyl (DULCOLAX) 10 MG suppository Place 10 mg rectally daily as needed for Constipation      vitamin C (ASCORBIC ACID) 500 MG tablet Take 500 mg by mouth daily      Insulin Aspart (NOVOLOG SC) Inject into the skin 4 times daily (before meals and nightly)       magnesium hydroxide (MILK OF MAGNESIA CONCENTRATE) 2400 MG/10ML SUSP Take 2,400 mg by mouth once as needed      Dulaglutide (TRULICITY) 0.72 XW/9.4HG SOPN Inject 0.75 mg into the skin once a week Takes on Mon  Qty: 2 pen, Refills: 3      sertraline (ZOLOFT) 100 MG tablet Take 1 tablet by mouth daily  Qty: 30 tablet, Refills: 5      blood glucose monitor strips Test three times daily.   One Touch ultra test strips  Qty: 100 strip, Refills: 3    Associated Diagnoses: Diabetes mellitus without complication (HCC)      hydrochlorothiazide (HYDRODIURIL) 25 MG tablet Take 25 mg by mouth daily      lisinopril (PRINIVIL;ZESTRIL) 40 MG tablet Take 40 mg by mouth daily      docusate sodium (COLACE) 100 MG capsule Take 100 mg by mouth 2 times daily      aspirin 81 MG EC tablet Take 1 tablet by mouth daily  Qty: 30 tablet, Refills: 0      Cholecalciferol (VITAMIN D3) 2000 units TABS Take 2,000 Units by mouth daily       hydrALAZINE (APRESOLINE) 50 MG tablet Take 1 tablet by mouth 2 times daily  Qty: 180 tablet, Refills: 1 Associated Diagnoses: Essential hypertension      Magnesium 400 MG CAPS Take 1 tablet by mouth 2 times daily  Qty: 180 capsule, Refills: 1    Associated Diagnoses: Essential hypertension      potassium chloride (KLOR-CON M) 20 MEQ extended release tablet Take 1 tablet by mouth daily  Qty: 90 tablet, Refills: 1    Associated Diagnoses: Essential hypertension      methenamine (HIPREX) 1 g tablet Take 1 tablet by mouth 2 times daily (with meals)  Qty: 60 tablet, Refills: 5      acetaminophen (TYLENOL) 500 MG tablet Take 500 mg by mouth every 6 hours as needed for Pain      solifenacin (VESICARE) 10 MG tablet Take 1 tablet by mouth daily  Qty: 90 tablet, Refills: 1      atorvastatin (LIPITOR) 40 MG tablet Take 1 tablet by mouth daily  Qty: 90 tablet, Refills: 1    Associated Diagnoses: Pure hypercholesterolemia      metFORMIN (GLUCOPHAGE) 500 MG tablet Take 1 tablet by mouth 2 times daily (with meals)  Qty: 180 tablet, Refills: 1    Associated Diagnoses: Type 2 diabetes mellitus with hyperglycemia, with long-term current use of insulin (HCC)      amLODIPine (NORVASC) 10 MG tablet Take 1 tablet by mouth daily  Qty: 90 tablet, Refills: 1    Associated Diagnoses: Essential hypertension             ALLERGIES     has No Known Allergies. FAMILY HISTORY     She indicated that her mother is . She indicated that her father is . She indicated that her sister is alive. She indicated that her brother is alive. She indicated that the status of her paternal grandmother is unknown.   family history includes Arthritis in her mother; Cancer in her father and paternal grandmother; Coronary Art Dis in her mother; Diabetes in her mother; High Blood Pressure in her mother; Other in her mother; Stroke in her father. SOCIAL HISTORY    reports that she has never smoked. She has never used smokeless tobacco. She reports that she does not drink alcohol and does not use drugs.     PHYSICAL EXAM     INITIAL VITALS:  height is 5' 1\" (1.549 m) and weight is 228 lb (103.4 kg). Her rectal temperature is 97.6 °F (36.4 °C). Her blood pressure is 99/29 (abnormal) and her pulse is 85. Her respiration is 18 and oxygen saturation is 97%. Physical Exam  Vitals and nursing note reviewed. Constitutional:       General: She is not in acute distress. Appearance: She is well-developed. She is morbidly obese. She is ill-appearing and toxic-appearing. She is not diaphoretic. HENT:      Head: Normocephalic and atraumatic. Right Ear: Hearing normal.      Left Ear: Hearing normal.      Nose: Nose normal. No rhinorrhea. Mouth/Throat:      Mouth: Mucous membranes are dry. Pharynx: Uvula midline. No oropharyngeal exudate. Eyes:      General: Lids are normal. No scleral icterus. Conjunctiva/sclera: Conjunctivae normal.      Pupils: Pupils are equal, round, and reactive to light. Neck:      Trachea: No tracheal deviation. Cardiovascular:      Rate and Rhythm: Normal rate and regular rhythm. Heart sounds: Normal heart sounds. No murmur heard. Pulmonary:      Effort: Pulmonary effort is normal. No respiratory distress. Breath sounds: Normal breath sounds. No stridor. No decreased breath sounds or wheezing. Abdominal:      General: There is no distension. Palpations: Abdomen is soft. Abdomen is not rigid. Tenderness: There is generalized abdominal tenderness. Musculoskeletal:         General: Normal range of motion. Cervical back: Normal range of motion and neck supple. No rigidity. Comments: Movement normal as observed   Lymphadenopathy:      Cervical: No cervical adenopathy. Skin:     General: Skin is cool and dry. Coloration: Skin is pale. Findings: No rash. Neurological:      Mental Status: She is easily aroused. She is lethargic. GCS: GCS eye subscore is 3. GCS verbal subscore is 4. GCS motor subscore is 5.       Comments: No gross deficits observed however patient does not follow most commands and falls asleep as I talk to her   Psychiatric:         Mood and Affect: Mood is depressed. Speech: Speech normal.         Behavior: Behavior is cooperative. Cognition and Memory: Memory is impaired. DIFFERENTIAL DIAGNOSIS:   Including but not limited to: Sepsis, diverticulitis, intra-abdominal abscess, cholecystitis, UTI, pyelonephritis, pneumonia, DHAVAL, hyponatremia, ICH    DIAGNOSTIC RESULTS     EKG: All EKG's are [facility] who either signs or Co-signs this chart in the absence of a cardiologist.  Ventricular rate 86 bpm  ID interval 196 ms  QRS duration 140 ms  QTc interval 476 ms  P-R-T axes 95, 98, 5  Normal sinus rhythm. RBBB. No STEMI. Morphology similar compared to previous EKG    RADIOLOGY: non-plain film images(s) such as CT,Ultrasound and MRI are read by the radiologist.  Plain radiographic images are visualized and preliminarily interpreted by the emergency physician unless otherwise stated below. CT HEAD WO CONTRAST   Final Result   Impression:   1. No acute intracranial hemorrhage or process   2. Cerebral volume loss. Cerebral white matter disease, likely ischemic    microvascular in nature      This document has been electronically signed by: Jp Broussard MD on    10/26/2022 12:16 AM      All CTs at this facility use dose modulation techniques and iterative    reconstructions, and/or weight-based dosing   when appropriate to reduce radiation to a low as reasonably achievable. CT ABDOMEN PELVIS WO CONTRAST Additional Contrast? None   Final Result   Impression:   1. Rectal wall thickening with perirectal inflammation consistent with    nonspecific proctitis. 2. Urinary bladder is decompressed. Urinary bladder wall thickening with    pericystic inflammation be reflective of cystitis. No bladder stones.       This document has been electronically signed by: Jp Broussard MD on    10/26/2022 12:33 AM All CTs at this facility use dose modulation techniques and iterative    reconstructions, and/or weight-based dosing   when appropriate to reduce radiation to a low as reasonably achievable. XR CHEST PORTABLE   Final Result   Impression:   1. No acute infiltrate.       This document has been electronically signed by: Eileen Eduardo MD on    10/25/2022 07:51 PM      XR PELVIS (1-2 VIEWS)    (Results Pending)       LABS:   Labs Reviewed   LACTATE, SEPSIS - Abnormal; Notable for the following components:       Result Value    Lactic Acid, Sepsis 3.9 (*)     All other components within normal limits   LACTATE, SEPSIS - Abnormal; Notable for the following components:    Lactic Acid, Sepsis 4.3 (*)     All other components within normal limits   CBC WITH AUTO DIFFERENTIAL - Abnormal; Notable for the following components:    WBC 18.7 (*)     MCHC 31.6 (*)     RDW-CV 15.7 (*)     RDW-SD 56.0 (*)     Segs Absolute 15.9 (*)     Lymphocytes Absolute 0.6 (*)     Monocytes Absolute 1.9 (*)     Immature Grans (Abs) 0.14 (*)     All other components within normal limits   URINE WITH REFLEXED MICRO - Abnormal; Notable for the following components:    Bilirubin Urine SMALL (*)     Ketones, Urine TRACE (*)     Blood, Urine LARGE (*)     Protein,  (*)     Leukocyte Esterase, Urine LARGE (*)     Color, UA DK YELLOW (*)     Character, Urine TURBID (*)     All other components within normal limits   COMPREHENSIVE METABOLIC PANEL - Abnormal; Notable for the following components:    Glucose 114 (*)     Creatinine 3.4 (*)     BUN 80 (*)     Sodium 134 (*)     Potassium 6.1 (*)     CO2 13 (*)     Calcium 8.1 (*)     Total Protein 5.9 (*)     Albumin 2.9 (*)     All other components within normal limits   TROPONIN - Abnormal; Notable for the following components:    Troponin T 0.060 (*)     All other components within normal limits   PROCALCITONIN - Abnormal; Notable for the following components:    Procalcitonin 1.97 (*) All other components within normal limits   GLOMERULAR FILTRATION RATE, ESTIMATED - Abnormal; Notable for the following components:    Est, Glom Filt Rate 13 (*)     All other components within normal limits   ANION GAP - Abnormal; Notable for the following components:    Anion Gap 18.0 (*)     All other components within normal limits   POCT GLUCOSE - Abnormal; Notable for the following components:    POC Glucose 115 (*)     All other components within normal limits   POCT GLUCOSE - Normal   COVID-19 & INFLUENZA COMBO   CULTURE, BLOOD 2   CULTURE, BLOOD 1   CULTURE, REFLEXED, URINE   BILE ACIDS, TOTAL   SPECIMEN REJECTION   OSMOLALITY   BASIC METABOLIC PANEL   BASIC METABOLIC PANEL   CORTISOL TOTAL   BLOOD GAS, VENOUS   LACTIC ACID   LACTIC ACID   LACTIC ACID   TROPONIN   TROPONIN   GLOMERULAR FILTRATION RATE, ESTIMATED   GLOMERULAR FILTRATION RATE, ESTIMATED       EMERGENCY DEPARTMENT COURSE:   Vitals:    Vitals:    10/26/22 0010 10/26/22 0142 10/26/22 0155 10/26/22 0201   BP:  (!) 104/20 (!) 102/41 (!) 99/29   Pulse: 87 88 84 85   Resp: 18 18     Temp:       TempSrc:       SpO2: 95% 97%     Weight:       Height:           2130: Because of patient's vital signs, elevated white count, and altered mental status diagnosis of sepsis was likely. Antibiotics were considered but source was not yet found. 2300: Rocephin ordered as urine was likely source of sepsis    MDM:  The patient was seen by me in the emergency room for lethargy and hypotension. Physical exam revealed an ill-appearing patient who was cool to the touch. Abdomen was generally tender. The patient responded to verbal stimulus and no gross neurological deficits were noted. Vital signs reviewed and noted hypotensive. Old records were reviewed. Appropriate laboratory and imaging studies were ordered and reviewed upon completion.   There was a significant delay in obtaining labs, actually many hours as CMP, procalcitonin and troponin rejected at least twice and patient was a difficult stick. Additionally patient was catheterized for urine without any obtained likely due to DHAVAL and dehydration. Pertinent findings: Leukocytosis 18.7 initial lactic acid 3.6; urinalysis showed UTI    Interventions: Normal saline at 30 cc/kg, Rocephin 2 g, and Levophed. Central line placed by Dr. Luis Crawford please refer to his procedure note. Reexamination: Patient and her vital signs seem to improve with fluid therapy. Patient was noted to spontaneously open her eyes and seemed more alert. Blood pressure improved. Hospitalist Dr. Get Ayoub was consulted in regards to admission. However patient's blood pressure started to dip down again and patient remained somnolent. Dr. Asa Marcelo advised ICU admission. Romelia Teague NP was consulted and graciously accepted admission. Ms. Manny Felton requested central line be placed in the ER which was performed by Dr. Luis Crawford. The patient was admitted to the hospital in fair condition. CRITICAL CARE:   During my workup of this patient, it did become clear to me the critical nature of this patient's illness which did require my constant attention, and a critical care time 60 minutes was given    CONSULTS:  0041: Dr. Get Ayoub (hospitalist)  [phone]: Romelia Teague NP (intensivist)    PROCEDURES:  Central line placed by Dr. Luis Crawford. FINAL IMPRESSION      1. Dehydration    2. Septicemia ([address]    3. Generalized abdominal pain    4. Altered mental status, unspecified altered mental status type    5. Acute cystitis with hematuria          DISPOSITION/PLAN     1. Dehydration    2. Septicemia ([address]    3. Generalized abdominal pain    4. Altered mental status, unspecified altered mental status type    5.  Acute cystitis with hematuria    Admission      (Please note that portions of this note were completed with a voice recognition program.  Efforts were made to edit the dictations but occasionally words are mis-transcribed.)    Marisol Collins PA-C 10/26/22 4:02 AM    HODAN Gallardo, [place]  10/26/22 6724

## 2022-10-26 PROBLEM — E87.20 LACTIC ACIDOSIS: Status: ACTIVE | Noted: 2022-10-26

## 2022-10-26 PROBLEM — E87.5 HYPERKALEMIA: Status: ACTIVE | Noted: 2022-10-26

## 2022-10-26 PROBLEM — N17.9 AKI (ACUTE KIDNEY INJURY) (HCC): Status: ACTIVE | Noted: 2022-01-01

## 2022-10-26 PROBLEM — E86.0 DEHYDRATION: Status: ACTIVE | Noted: 2022-10-26

## 2022-10-26 NOTE — ED PROVIDER NOTES
Called to place a central line. This is an 30-year-old female who is most likely sepsis. This is most likely arising from urinary tract infection. Patient had spent a prolonged period of time down here in the emergency room and had been hypotensive. Patient was started on Levophed peripherally. ICU requested that I place a central line for them. Patient was awake and alert when I went in. Blood pressure was 130s over 50s. Patient is on the Levophed drip peripherally at this time.     Labs Reviewed   LACTATE, SEPSIS - Abnormal; Notable for the following components:       Result Value    Lactic Acid, Sepsis 3.9 (*)     All other components within normal limits   LACTATE, SEPSIS - Abnormal; Notable for the following components:    Lactic Acid, Sepsis 4.3 (*)     All other components within normal limits   CBC WITH AUTO DIFFERENTIAL - Abnormal; Notable for the following components:    WBC 18.7 (*)     MCHC 31.6 (*)     RDW-CV 15.7 (*)     RDW-SD 56.0 (*)     Segs Absolute 15.9 (*)     Lymphocytes Absolute 0.6 (*)     Monocytes Absolute 1.9 (*)     Immature Grans (Abs) 0.14 (*)     All other components within normal limits   URINE WITH REFLEXED MICRO - Abnormal; Notable for the following components:    Bilirubin Urine SMALL (*)     Ketones, Urine TRACE (*)     Blood, Urine LARGE (*)     Protein,  (*)     Leukocyte Esterase, Urine LARGE (*)     Color, UA DK YELLOW (*)     Character, Urine TURBID (*)     All other components within normal limits   COMPREHENSIVE METABOLIC PANEL - Abnormal; Notable for the following components:    Glucose 114 (*)     Creatinine 3.4 (*)     BUN 80 (*)     Sodium 134 (*)     Potassium 6.1 (*)     CO2 13 (*)     Calcium 8.1 (*)     Total Protein 5.9 (*)     Albumin 2.9 (*)     All other components within normal limits   TROPONIN - Abnormal; Notable for the following components:    Troponin T 0.060 (*)     All other components within normal limits   PROCALCITONIN - Abnormal; Notable for the following components:    Procalcitonin 1.97 (*)     All other components within normal limits   GLOMERULAR FILTRATION RATE, ESTIMATED - Abnormal; Notable for the following components:    Est, Glom Filt Rate 13 (*)     All other components within normal limits   ANION GAP - Abnormal; Notable for the following components:    Anion Gap 18.0 (*)     All other components within normal limits   POCT GLUCOSE - Abnormal; Notable for the following components:    POC Glucose 115 (*)     All other components within normal limits   POCT GLUCOSE - Normal   COVID-19 & INFLUENZA COMBO   CULTURE, BLOOD 2   CULTURE, BLOOD 1   CULTURE, REFLEXED, URINE   BILE ACIDS, TOTAL   SPECIMEN REJECTION   OSMOLALITY     CT HEAD WO CONTRAST   Final Result   Impression:   1. No acute intracranial hemorrhage or process   2. Cerebral volume loss. Cerebral white matter disease, likely ischemic    microvascular in nature      This document has been electronically signed by: Bailey Emery MD on    10/26/2022 12:16 AM      All CTs at this facility use dose modulation techniques and iterative    reconstructions, and/or weight-based dosing   when appropriate to reduce radiation to a low as reasonably achievable. CT ABDOMEN PELVIS WO CONTRAST Additional Contrast? None   Final Result   Impression:   1. Rectal wall thickening with perirectal inflammation consistent with    nonspecific proctitis. 2. Urinary bladder is decompressed. Urinary bladder wall thickening with    pericystic inflammation be reflective of cystitis. No bladder stones. This document has been electronically signed by: Bailey Emery MD on    10/26/2022 12:33 AM      All CTs at this facility use dose modulation techniques and iterative    reconstructions, and/or weight-based dosing   when appropriate to reduce radiation to a low as reasonably achievable. XR CHEST PORTABLE   Final Result   Impression:   1. No acute infiltrate.       This document has been electronically signed by: Sujit Augustin MD on    10/25/2022 07:51 PM      XR PELVIS (1-2 VIEWS)    (Results Pending)     1. Dehydration    2. Septicemia ([address]    3. Generalized abdominal pain    4. Altered mental status, unspecified altered mental status type    5. Acute cystitis with hematuria      Central Line    Date/Time: 10/26/2022 3:20 AM  Performed by: Arianne Mcguire DO  Authorized by: Neri Pacheco MD     Consent:     Consent obtained:  Verbal and written    Consent given by:  Patient and healthcare agent    Risks discussed:  Arterial puncture, incorrect placement, bleeding, infection, nerve damage and pneumothorax    Alternatives discussed:  No treatment, delayed treatment and alternative treatment  Universal protocol:     Procedure explained and questions answered to patient or proxy's satisfaction: yes      Relevant documents present and verified: yes      Test results available: yes      Imaging studies available: yes      Required blood products, implants, devices, and special equipment available: yes      Site/side marked: yes      Immediately prior to procedure, a time out was called: yes      Patient identity confirmed:  Verbally with patient, hospital-assigned identification number and arm band  Pre-procedure details:     Indication(s): central venous access      Hand hygiene: Hand hygiene performed prior to insertion      Sterile barrier technique: All elements of maximal sterile technique followed      Skin preparation:  Chlorhexidine with alcohol    Skin preparation agent: Skin preparation agent completely dried prior to procedure    Sedation:     Sedation type:  None  Anesthesia:     Anesthesia method:  Local infiltration    Local anesthetic:  Lidocaine 1% w/o epi  Procedure details:     Location:  R femoral    Site selection rationale: Body habitus, altered mental status and grabbing around head neck.     Patient position:  Supine    Procedural supplies:  Triple lumen    Catheter size:  7 Fr    Landmarks identified: yes      Ultrasound guidance: yes      Ultrasound guidance timing: prior to insertion and real time      Sterile ultrasound techniques: Sterile gel and sterile probe covers were used      Number of attempts:  1    Successful placement: yes    Post-procedure details:     Post-procedure:  Dressing applied and line sutured    Assessment:  Blood return through all ports and placement verified by x-ray    Procedure completion:  Tolerated well, no immediate complications      Seen this patient with [facility] the PA and agree with her assessment and plan.      Radha Lezama DO  10/26/22 1169

## 2022-10-26 NOTE — PROCEDURES
[address] [id number]                            CARDIAC CATHETERIZATION    PATIENT NAME: Jensen Rob                     :        1940  MED REC NO:   [id number]                           ROOM:       0012  ACCOUNT NO:   [de-identified]                           ADMIT DATE: 10/25/2022  PROVIDER:     Hope Rincon MD    DATE OF PROCEDURE:  10/26/2022    PROCEDURES PERFORMED:  Coronary angiogram, graft angiography, LVEDP  measurement. INDICATION FOR PROCEDURE:  Abnormal EKG suggestive of a STEMI. DESCRIPTION OF PROCEDURE:  After informed consent was obtained from the  patient's family members, the patient was brought to the cardiac  catheterization laboratory and prepped according to the standard  protocol. Left groin was used as an access point. Under fluoroscopy  and ultrasound guidance, we were able to place the 6-[place] arterial  sheath in the left common femoral artery. Once the sheath was in place,  a femoral angiogram was performed, which showed common femoral artery  stick. EQUIPMENTS USED:  Standard 6-[place] 3DRC, EBU 3.5 guide catheters,  pigtail catheter. ESTIMATED BLOOD LOSS:  During this procedure was 30 mL. MEDICATIONS:  Please see MAR. CORONARY ANGIOGRAPHY:  The right coronary artery appears to be a  dominant vessel. There is 40% proximal stenosis followed by mild  luminal irregularities in the mid segment and distal portions. Distally  there is another 30% to 40% stenosis followed by mild luminal  irregularities. The rest of the RCA is patent. There is a moderate  size PDA and a large size PL branches, both of which are overall patent  with mild luminal irregularities. The left main is overall patent, but LAD is occluded at the ostial  portion, there is a large caliber OM1 vessel that is overall patent with  moderate diffuse disease throughout the entire main vessel.   The native  AV groove vessel is totally occluded. Overall, it appears to be a small  caliber in size. LAD is totally occluded in the ostial portion. GRAFT ANGIOGRAPHY:  LIMA to LAD is overall patent. The LAD beyond the  touchdown point of the LIMA is patent with moderate diffuse disease. The flow in the LAD also gets back into retrograde into the circumflex  artery, which is tortuous with moderate diffuse disease. Of note, there is a vein graft stump, which is not known where it is  bypassed to. We did attempt to wire this on to the occluded stump to  see if this was in fact an acute lesion or an infarct. LVEDP was measured to be 20 mmHg. No significant gradient was noted on  the cath. LV systolic function was not assessed due to severe renal  function and having only one kidney. Of note, LVEF appears to be about 20% just on fluoroscopy. Based on these angiographic findings, we decided to take the patient off  the table as there is no acute epicardial large vessel disease that  could be revascularized. SUMMARY:  Severe native vessel disease with patent LIMA to LAD graft. The flow from the LIMA supplies the LCx as well with retrograde flow. Patent RCA. Likely MI 2/2 to small vessel disease in context of sepsis. RECOMMENDATIONS:  1. Continue with medical management. 2.  Suggest IV heparin for 48 hours. 3.  Needs to get 2D echocardiogram to evaluate LV systolic function. 4.  Monitor groin access site closely. 5.  Needs to optimize hemodynamics. 6.  The patient to continue IV Levophed. 7.  Treat underlying sepsis/septic shock. All procedure details and management plans were discussed in detail with  the patient's  in detail, and he was in agreement with the plan.         Elizabeth Madrigal MD    D: 10/26/2022 8:03:29       T: 10/26/2022 9:52:53     [email or web address]  Job#: [id number]     Doc#: [id number]    CC:

## 2022-10-26 NOTE — PLAN OF CARE
Name: James Schneider  MRN: [id number]  Date of Service: 10/26/2022  Time: 6:52 PM    In the morning, EKG did show STEMI with elevation in leads 4 5 and 6. Dr. Basil Bashir  was contacted. STEMI alert was paged. Patient taken to Cath lab with diffuse RCA, Lcx, and small vessel disease and Cardiology recommended medical management. Returning to the ICU, patient was started on Bicarb for metabolic acidosis and CRRT for oliguric DHAVAL, nephrology is following. Her pressor demands escalated as well, at the time of this note to: 70 mcg/min phenylephrine 58 mcg/min levophed, 0.04 units/min Vasopressin. Patient is still being treated for sepsis shock secondary to UTI with IV Rocephin. In afternoon, Spoke to [place] (spouse) in person to update on patient's condition. All questions answered. Code status back to Limited x 4 with notice of no intubation under any circumstance.     Electronically signed Aurea Sol DO on 10/26/2022 at 7:02 PM

## 2022-10-26 NOTE — SIGNIFICANT EVENT
Updated by primary nurse of patient's chest pain and change in rhythm on monitor. EKG was obtained. EKG did show STEMI with elevation in leads 4 5 and 6. Dr. Ravi Quezada  was contacted. STEMI alert was paged. Electronically signed by [employer].  Nixon Fox CNP on 10/26/2022 at 6:38 AM

## 2022-10-26 NOTE — PROGRESS NOTES
Central Line Placement: Risks and benefits to the procedure were discussed. Alternatives and their risks were discussed as well. Patient was placed in the attention position. Patient was placed in Trendelenburg position. Patient was prepped using Chlorhexidene prep. Patient was draped utilizing sterile gloves, hair net, mask, sterile gown and sterile OR towels. Maximum barrier precautions were utilized. Utilizing the right subclavian approach, patient received 5 cc of 1% lidocaine. Utilizing an introducer needle, it was placed subcutaneously advanced under the clavicle and leveled flat. It was subsequently advanced toward the thoracic inlet, until venous return was obtained. A guide wire was placed through the introducer needle. The introducer needle was subsequently withdrawn leaving the guide wire in place. Utilizing a scalpel, a small incision was made in the skin. A punch dilator was placed over the guide wire and subsequently withdrawn leaving the guide wire in place. A dialysis catheter was subsequently placed over the guide wire. The guide wire was subsequently withdrawn leaving this catheter in place. All ports had good venous return and were subsequently flushed with saline. The catheter was secured using 2.0 silk. Secondary cleansing with chlorhexidine prep was subsequently placed. Tegaderm with bio- patch dressing was utilized for secondary securing and protection. There were no complications. EBL:   Less than 5 mL      Indication:  renal failure    Electronically signed by Brayna Crandall.  Malinda Acosta MD

## 2022-10-26 NOTE — ED NOTES
Unsuccessful attempt to straight cath pt at this time. Warm blanket applied to pt.       Lo De La Vega RN  10/25/22 2120

## 2022-10-26 NOTE — ED NOTES
Urine specimen collected from female external catheter collection canister at this time.       Fercho Frye RN  10/25/22 6104

## 2022-10-26 NOTE — CARE COORDINATION
Case Management Assessment  Initial Evaluation    Date/Time of Evaluation: 10/26/2022 9:10 AM  Assessment Completed by: Christ Silva RN    If patient is discharged prior to next notation, then this note serves as note for discharge by case management. Patient Name: Vickie Mcclain                   YOB: 1940  Diagnosis: Dehydration [E86.0]  Septicemia ([address] [A41.9]  Generalized abdominal pain [R10.84]  Acute cystitis with hematuria [N30.01]  Septic shock ([address] [A41.9, R65.21]  Altered mental status, unspecified altered mental status type [R41.82]                   Date / Time: 10/25/2022  5:51 PM    Patient Admission Status: Inpatient     Current PCP: Erika Alicia MD     Assessment deferred as patient has been at [facility] for 3 years; see SW assessment. Chart Reviewed: Yes        Hospitalization in the last 30 days (Readmission):  No    If yes, Readmission Assessment in CM Navigator will be completed. Advance Directives:     Code Status: Limited     Financial  Payor: Adelina Tariq / Plan: Vernida Baumgarten ESSENTIAL/PLUS / Product Type: *No Product type* /     Does insurance require precert for SNF: Yes    Potential assistance Purchasing Medications:    Meds-to-Beds request: Yes      [facility]/pharmacy #[id number]- [place], [place] - [address] [facility] [phone]  [address] [place] [address] Nika Carvajals  Phone: [phone] Fax: [phone]      Factors facilitating achievement of predicted outcomes: Family support and Cooperative    Barriers to discharge: Medical complications and Medication managment    Additional Case Management Notes: Presented to ED from [facility] with c/o lethargy for 2 days. Reportedly declining recently, not eating or drinking well, and refusing her medications. Incontinent of stool on arrival. Hypotensive and received 30 ml/kg fld bolus then started on levophed drip. Admitted to ICU. STEMI alert this morning.  Cardiology consulted and patient taken urgently to cath lab - see note below. Echo ordered. Nephrology consulted for oliguric DHAVAL, hyperkalemia, and metabolic acidosis. TESSIO placed this morning. Plan to start CRRT. Afebrile. NSR. On room air. Oriented to self only. Follows commands. Limited code - no x4. Telemetry, CVC, TESSIO, n/v checks, wound care, external urinary catheter, SCDs. IVF, amio @ 1 mg/min, levo @ 50 mcg/min, bicarb drip, vasopressin @ 0.03 units/min, IV albumin Q6H, IV rocephin, sq heparin. Received 1g Ca+ gluconate, 10 units IV insulin x1, D10 bolus x1. K+ 5.8 - now 5.3, CO2 14, BUN 83 - now 81, Creat 3.5 - now 3.4, AG 21 - now 22, ICal 0.95, Mg 4.5 - now 4.1, LA 3.9 - now 5.9, phos 5.2, procal 1.97, trop 0.079 - then 1.35, alb 2.9, total PRO 5.9, alb 2.9, wbc 18.7 - now 17.5, hgb 13.9 - now 11. Rapid flu and COVID 19 were negative. Urine w/large leukocytes - sent for culture. Blood cultures sent. Procedures:  10/25 CXR: No acute infiltrate  10/26 CT Head: No acute findings  10/26 CT Abd/pelvis: Rectal wall thickening with perirectal inflammation consistent with   nonspecific proctitis; Urinary bladder is decompressed. Urinary bladder wall thickening with pericystic inflammation be reflective of cystitis. No bladder stones  10/26 CVC right femoral  10/26 Renal US: No hydronephrosis of the right kidney  10/26 Cardiac Cath: Occluded SVG; mod to severe diffuse LCX disease; mild to mod diffuse disease of RCA; small vessel disease. 10/26 TESSIO right subclavian  10/26 CRRT to start      The Plan for Transition of Care is related to the following treatment goals of Dehydration [E86.0]  Septicemia ([address] [A41.9]  Generalized abdominal pain [R10.84]  Acute cystitis with hematuria [N30.01]  Septic shock ([address] [A41.9, R65.21]  Altered mental status, unspecified altered mental status type [R41.82]    Patient Goals/Plan/Treatment Preferences: Return to [facility]  Will require precert to be started, but per SNF, can then send back - do not need to wait for precert approval. SW on case. Will need PT/OT when appropriate.      Sadi Schwartz RN  Case Management Department

## 2022-10-26 NOTE — PROGRESS NOTES
Pharmacy Medication History Note      List of current medications patient is taking is complete. Source of information: [facility]    Changes made to medication list:  Medications removed (include reason, ex. therapy complete or physician discontinued):  Removed acetaminophen 500 mg Q 6 hours PRN Pain (not on MAR)  Removed amlodipine 10 mg QD (Not on MAR)  Removed apixaban 5 mg BID (Not on MAR. Pt taking Xarelto)  Removed Vitamin D3 2000 units QD (Not on MAR)  Removed hydralazine 50 mg BID (Not on MAR)  Removed Lantus 15 units QHS (Not on MAR. Pt on novolog and Levemir)  Removed Milk of Magnesia 2,400 mg PO PRN (Not on MAR)  Removed polyethylene glycol 17 g QD (Not on MAR)  Removed sertraline 100 mg QD (Not on MAR)  Removed solfenacin 10 mg QD (Not on MAR)    Medications added/doses adjusted:  Adjusted Colace 100 mg from 1 tablet BID to 1 tablet QAM for constipation (per MAR)  Adjusted HCTX 25 mg QD to 1 tab unless SBP is less than 120 or pulse is less than 60 (per MAR)  Added levemir 100 units/mL: inject 20 units QAM for diabetes (Per MAR)  Adjusted lisinopril 40 mg QAM to lisinopril 20 mg QAM for hypertension (Per MAR)  Adjusted Trulicity 1.38 mg once weekly to 1.5 mg SQ weekly. Patient injects on Wednesday (per [facility])  Added Wellbutrin 150 mg XL QAM for major depressive disorder (Per MAR)  Added Xarelto 20 mg QAM for afib (Per MAR)  Added divalproex  m tab BID for dementia with behaviors (Per MAR)  Added miconazole nitrate cream 2% : apply to left abdominal fold topically BID for moisture control (Per MAR)  Added quetiapine 25 mg BID for dementia with BD (Per MAR)  Added furosemide 40 mg 1 tab TID for edema (per MAR)  Adjusted bisacodyl 10 mg suppository to bisacodyl 5 mg EC tablets: take 3 tablets PO Q24 hours PRN Constipation (Per MAR)  Added Nystatin powder topically under breast, groin topically Q 8 hours PRN reddened areas (Per mar)    Other notes (ex.  Recent course of antibiotics, Coumadin dosing):  Patient was recently on Levemir 30 units QAM but decreased to 20 units QAM on 10/13/22      Allergies reviewed      Electronically signed by Violet Olmedo on 10/26/2022 at 12:32 PM

## 2022-10-26 NOTE — PROGRESS NOTES
2124 ekg changes noted on bedside monitor  0622 order placed for ekg per protocol  0622 order placed for add on magnesium  0627 ekg at bedside  0628 ekg showed to Teddie Burkitt, CNP and Dr Radha Sykes  4535 stemi alert called, Dr Norma Avila notified of stemi alert and sent ekg by Jamal Riedel  1684 aspirin suppository order placed per Dr Sancho Angulo d/t patient lethargy  1502 cath lab team at bedside  5655 patient en route to cath lab with cath lab team, aspirin suppository handed to cath lab team

## 2022-10-26 NOTE — CONSULTS
[facility]    [address], [address]  [facility]  10/26/2022 10:51 AM    Pt Name:    Isaac Small  MRN:     [id number]   [id number]  YOB: 1940  Admit Date:    10/25/2022  5:51 PM  Primary Care Physician:  Randall Salazar MD        Reason for Consult:  Oliguric DHAVAL, hyperkalemia, metabolic acidosis    History:   The patient is a [de-identified] y.o.  female with PMHx of lifetime non-smoker, arthritis, CAD, CKD stage II, DM2, DM retinopathy, HTN, HLD, morbid obesity, and left nephrectomy. Who presented to the [facility] ED on 10/25 with complaint of lethargy. EMS found her to be hypotensive with BP 67/56 and pulse ox was 90%. In ED patient was only able to say \"I do not feel well\" and answer questions \"I do not know\". Patient's  was present he states she has not been eating and drinking well or taking her medications for the past few days. She is becoming increasingly weaker. Since 10/24 she would only respond to loud voice or pain due to extreme lethargy. Patient was determined to be in septic shock. Pro-Willy was 1.97. Urine showed large amount of blood, large leukocyte esterase, negative nitrite, many bacteria, greater than 200 WBCs. CT abdomen pelvis showed no left kidney, right kidney did not show stones or hydronephrosis, urinary bladder wall thickened with pericystic inflammation reflective of cystitis. Also showed perirectal inflammation consistent with nonspecific proctitis. Renal ultrasound showed kidney that was difficult to visualize. It had a grossly normal echotexture. No thinning of renal cortex. No hydronephrosis. No stones. Patient received normal saline sepsis bolus. Patient was started on Rocephin. She had an elevated troponin 0.06 on arrival current measurement is 1.35. EKG at 0627 showed elevations in anterior leads V3 through V6. Patient received left heart cath. It showed patent LIMA to LAD. Occluded SVG (unsure where it was bypassed to). Moderate to severe disease of LCx, mild to moderate disease of the RCA, small vessel disease. Patient was incontinent of stool at arrival.  She had elevated lactate 3.9 on arrival, it is currently 5.9. Patient other chemistries are notable for K 5.3 CO2 14 BUN 81 creatinine 3.4 anion gap 22 calcium 7.7 ionized CA 0.95 magnesium 4.1 phosphorus 5.2 and glucose 277.  states feet are a lot less swollen than he seen them in a long time. Past Medical History:  Past Medical History:   Diagnosis Date    Arthritis     CAD (coronary artery disease)     s/p MI, CABG 2 vessels 1994    CKD stage 2 due to type 2 diabetes mellitus (HCC)     secondary to obesity, aging, and DM    DM (diabetes mellitus) ([address]     DM retinopathy ([facility]     mild    HTN (hypertension)     variable, improved by chaning her medication raya. Hyperlipidemia 1994    Morbid obesity ([facility]     OAB (overactive bladder)     chronic episodes of incontinence per     UTI (urinary tract infection) 07/2017    preop       Past Surgical History:  Past Surgical History:   Procedure Laterality Date    CORONARY ARTERY BYPASS GRAFT  1995    s/p two-vessel [place]    HYSTERECTOMY  1994    KNEE ARTHROSCOPY Right ?     Dr. Shade Kessler LAP, RADICAL NEPHRECTOMY Left 3/21/2018    ROBOT ASSISTED LAPAROSCOPIC LEFT RADICAL NEPHRECTOMY performed by Isis Jain MD at [address] Right     TUMOR REMOVAL  1994    ovarian       Family History:  Family History   Problem Relation Age of Onset    Arthritis Mother     Coronary Art Dis Mother     Diabetes Mother     High Blood Pressure Mother     Other Mother         TIA's    Cancer Father     Stroke Father     Cancer Paternal Grandmother        Social History:  Social History     Socioeconomic History    Marital status:      Spouse name: Not on file    Number of children: Not on file    Years of education: Not on file    Highest education level: Not on file   Occupational History    Not on file   Tobacco Use    Smoking status: Never    Smokeless tobacco: Never   Substance and Sexual Activity    Alcohol use: No    Drug use: No    Sexual activity: Not on file   Other Topics Concern    Not on file   Social History Narrative    Not on file     Social Determinants of Health     Financial Resource Strain: Not on file   Food Insecurity: Not on file   Transportation Needs: Not on file   Physical Activity: Not on file   Stress: Not on file   Social Connections: Not on file   Intimate Partner Violence: Not on file   Housing Stability: Not on file       Home Meds:  Prior to Admission medications    Medication Sig Start Date End Date Taking?  Authorizing Provider   apixaban (ELIQUIS) 5 MG TABS tablet Take 1 tablet by mouth 2 times daily 2/28/20   NEELIMA Matamoros CNP   insulin glargine (LANTUS) 100 UNIT/ML injection vial Inject 15 Units into the skin nightly 2/28/20   NEELIMA Matamoros CNP   metoprolol succinate (TOPROL XL) 25 MG extended release tablet Take 1 tablet by mouth daily 2/29/20   NEELIMA Matamoros CNP   donepezil (ARICEPT) 5 MG tablet Take 5 mg by mouth nightly    Historical Provider, MD   polyethylene glycol (GLYCOLAX) packet Take 17 g by mouth daily    Historical Provider, MD   bisacodyl (DULCOLAX) 10 MG suppository Place 10 mg rectally daily as needed for Constipation    Historical Provider, MD   vitamin C (ASCORBIC ACID) 500 MG tablet Take 500 mg by mouth daily    Historical Provider, MD   Insulin Aspart (NOVOLOG SC) Inject into the skin 4 times daily (before meals and nightly)     Historical Provider, MD   magnesium hydroxide (MILK OF MAGNESIA CONCENTRATE) 2400 MG/10ML SUSP Take 2,400 mg by mouth once as needed    Historical Provider, MD   Dulaglutide (TRULICITY) 4.06 CP/7.2KG SOPN Inject 0.75 mg into the skin once a week Takes on Mon 10/4/18   Yosi Kulkarni MD   sertraline (ZOLOFT) 100 MG tablet Take 1 tablet by mouth daily 8/3/18   Burke Jimenez MD   blood glucose monitor strips Test three times daily.   One Touch ultra test strips 7/23/18   Burke Jimenez MD   hydrochlorothiazide (HYDRODIURIL) 25 MG tablet Take 25 mg by mouth daily    Historical Provider, MD   lisinopril (PRINIVIL;ZESTRIL) 40 MG tablet Take 40 mg by mouth daily    Historical Provider, MD   docusate sodium (COLACE) 100 MG capsule Take 100 mg by mouth 2 times daily    Historical Provider, MD   aspirin 81 MG EC tablet Take 1 tablet by mouth daily 3/27/18   NEELIMA Begum CNP   Cholecalciferol (VITAMIN D3) 2000 units TABS Take 2,000 Units by mouth daily     Historical Provider, MD   hydrALAZINE (APRESOLINE) 50 MG tablet Take 1 tablet by mouth 2 times daily 3/8/18   Burke Jimenez MD   Magnesium 400 MG CAPS Take 1 tablet by mouth 2 times daily 3/8/18   Burke Jimenez MD   potassium chloride (KLOR-CON M) 20 MEQ extended release tablet Take 1 tablet by mouth daily 3/8/18   Burke Jimenez MD   methenamine (HIPREX) 1 g tablet Take 1 tablet by mouth 2 times daily (with meals) 1/31/18   NEELIMA Monterroso CNP   acetaminophen (TYLENOL) 500 MG tablet Take 500 mg by mouth every 6 hours as needed for Pain    Historical Provider, MD   solifenacin (VESICARE) 10 MG tablet Take 1 tablet by mouth daily 11/10/17   Burke Jimenez MD   atorvastatin (LIPITOR) 40 MG tablet Take 1 tablet by mouth daily 10/21/17   Burke Jimenez MD   metFORMIN (GLUCOPHAGE) 500 MG tablet Take 1 tablet by mouth 2 times daily (with meals) 9/19/17   Burke Jimenez MD   amLODIPine (NORVASC) 10 MG tablet Take 1 tablet by mouth daily 9/19/17   Burke Jimenez MD       Review of Systems:  Constitutional: no fever or chills  Head: No headaches  Eyes: no blurry vision, no discharge  Ears: no ear pain or hearing changes  Nose: no runny nose or epistaxis  Respiratory: no shortness of breath or cough or sputum production  Cardiovascular: no chest pain, no edema  GI: no nausea, no vomiting or diarrhea  : denies any hematuria, no flank pain  Skin: no rash  Musculoskeletal: no joint pain, moves all ext  Neuro: no tremor, no slurred speech  Psychiatric: stable mood, no depression or insomnia    Current Meds:  Infusion:    norepinephrine 50 mcg/min (10/26/22 0947)    sodium chloride      sodium bicarbonate infusion 200 mL/hr at 10/26/22 1041    dextrose      sodium chloride      sodium chloride      amiodarone 1 mg/min (10/26/22 0935)    Followed by    amiodarone      vasopressin (Septic Shock) infusion 0.04 Units/min (10/26/22 1042)     Meds:    sodium chloride flush  5-40 mL IntraVENous 2 times per day    heparin (porcine)  5,000 Units SubCUTAneous 3 times per day    albumin human  25 g IntraVENous Q6H    [START ON 10/27/2022] cefTRIAXone (ROCEPHIN) IV  2,000 mg IntraVENous Q24H    aspirin  300 mg Rectal Once    sodium chloride flush  5-40 mL IntraVENous 2 times per day     Meds prn: sodium chloride flush, sodium chloride, ondansetron **OR** ondansetron, polyethylene glycol, [DISCONTINUED] acetaminophen **OR** acetaminophen, glucose, dextrose bolus **OR** dextrose bolus, glucagon (rDNA), dextrose, sodium chloride flush, sodium chloride, acetaminophen     Allergies/Intolerances: ALLERGIES: Patient has no known allergies. 24HR INTAKE/OUTPUT:    Intake/Output Summary (Last 24 hours) at 10/26/2022 1051  Last data filed at 10/26/2022 0603  Gross per 24 hour   Intake 2513.33 ml   Output --   Net 2513.33 ml     I/O last 3 completed shifts: In: 2513.3 [I.V.:300.6; IV Piggyback:2212.8]  Out: -   No intake/output data recorded. Admission weight: 228 lb (103.4 kg)  Wt Readings from Last 3 Encounters:   10/26/22 225 lb 12 oz (102.4 kg)   02/28/20 256 lb (116.1 kg)   04/09/19 235 lb (106.6 kg)     Body mass index is 42.66 kg/m².     Physical Examination:  VITALS:  BP (!) 128/52   Pulse 96   Temp 98.8 °F (37.1 °C) (Rectal)   Resp 16   Ht 5' 1\" (1.549 m)   Wt 225 lb 12 oz (102.4 kg) SpO2 97%   BMI 42.66 kg/m²   Weight:   Wt Readings from Last 3 Encounters:   10/26/22 225 lb 12 oz (102.4 kg)   02/28/20 256 lb (116.1 kg)   04/09/19 235 lb (106.6 kg)     Constitutional and General Appearance: Obese, elderly woman, laying on bed, decreased LOC, does not look at me or communicate, but she is cooperative with exam.  Response to pain (when blood is drawn by phlebotomy), looks sick  Eyes: no icteric sclera, no pallor conjunctiva, no discharge seen from either eye  Ears and Nose: normal external appearance of left and right ear and nose. No active drainage from nose. Oral: moist oral mucus membranes  Neck: No jugular venous distention, appears symmetric, good ROM  Lungs: Symmetrical air entry B/L, some diffuse wheezing throughout lung fields, no crackles or rales, no use of accessory muscles  Heart: regular rate, S1, S2, no murmur rub gallop  Extremities: No LE edema.  says this is the least swollen he is seeing her feet. There is some wrinkling consistent with water loss. GI: soft, non-tender, no guarding, normal bowel sounds  Skin: no rash seen on exposed extremities  Musculo: moves all extremities  Neuro: no slurred speech, no facial drooping, no asterixis  Psychiatric: Awake, noncommunicative, questionable LOC    Lab Data  CBC:   Recent Labs     10/25/22  2057 10/26/22  0445 10/26/22  0820   WBC 18.7*  --  17.5*   HGB 13.9 12.8 11.0*   HCT 44.0 44.2 38.0     --  204     BMP:  Recent Labs     10/26/22  0445 10/26/22  0820 10/26/22  1015    140 139   K 5.8* 5.3* 4.7    104 104   CO2 14* 14* 11*   BUN 83* 81* 79*   CREATININE 3.5* 3.4* 3.3*   GLUCOSE 194* 277* 302*   CALCIUM 8.0* 7.7* 8.0*   MG 4.5* 4.1* 4.0*     PTH: @PTH@  TSH: No results for input(s): TSH in the last 72 hours. HgBa1c: No results for input(s): LABA1C in the last 72 hours.   Hepatic:   Recent Labs     10/26/22  0056   LABALBU 2.9*   AST 23   ALT 12   BILITOT 0.4   ALKPHOS 61     ABGs: No results found for: PHART, PO2ART, IHZ9IPP  Troponin: No results for input(s): TROPONINI in the last 72 hours. BNP: No results for input(s): BNP in the last 72 hours. Old labs reviewed. Impression and Plan:  DHAVAL on CKD 2: Likely started as prerenal due to dehydration, low perfusion due to MI/septic shock. Likely progressed to ATN  -Baseline creat 1.4  -Creat 3.3 BUN 74 patient is oliguric 200 mL urine last 24 hours  -Hx of nephrectomy left-sided  -Patient to start CRRT due to lactic acidosis #3 below  Metabolic acidosis: Current CO2 14 2/2 lactic acidosis #3 below  Lactic acidosis: 2/2 sepsis #4 below  -Patient started on CRRT  Septic shock: 2/2 UTI #5 below treat for UTI  -Received sepsis bolus in ED, started on Rocephin, on pressors  UTI: AMS, lethargy, urine shows large amount of blood, negative for nitrite, leukocyte esterase positive, > 200 WBC, many bacteria. -CT showed decompressed urinary bladder. Wall thickening with pericystic inflammation reflective of cystitis. No stones. No hydronephrosis. -Patient on Rocephin. Tx per primary  Mild macrocytic anemia: Hgb 11 .3  STEMI: Noted medical management per primary  Hx of dementia: Noted    Thank you for allowing me to participate in the care of this patient. Please feel free to call me if you have any questions.      Electronically signed by Jimmy Brandon DO on 10/26/22 at 10:51 AM EDT  Supervised by  Silvia Odonnell DO    [facility]

## 2022-10-26 NOTE — H&P
CRITICAL CARE PROGRESS NOTE      Patient:  Lexy Abbasi    NZOD/LBH:[id number]  YOB: 1940  MRN: [id number]   PCP: Flory Argueta MD  Date of Admission: 10/25/2022  Chief Complaint:- hypotension    Assessment and Plan:    Septic Shock:  +4/4 SIRS, Lactic Acid 3.9-4.3, PCT 1.97. Patient was given fluid bolus of 30 mL/kg of 0.9 normal saline positive for persistent hypotension despite bolus. Levophed drip was started to maintain mean arterial pressure of 65. Complicated UTI: History of OAB, and UTI in the past.  No ESBL history. History of Urine Cltx 2020-Proteus mirabilis sensitive to Rocephin. 10/25/2022 CT abdomen pelvis positive for pericystic inflammation the reflective of cystitis. Rocephin will be continued. Cultures are pending. Acute on chronic kidney injury: (baseline Crea 3/2022 1.4) 10/25/2022 Urine large blood, 300 protein. 10/25/2022 CT abdomen pelvis-left kidney was not identified, right kidney no hydronephrosis. ?history of left nephrectomy. IV fluids to be continued, repeat BMP. Dose medications to GFR no nonsteroidals are to be given. Acute hyperkalemia: No EKG changes noted. Repeat level is pending. Hyponatremia, mild: Repeat and monitor. Mildly elevated troponin: The setting of sepsis and acute kidney injury questionable demand ischemia. Repeat troponin patient is on anticoagulation as a home medication. EKG no acute ST changes noted. Echo is pending. PAF: History, awaiting home medication list to review and reconcile. Dementia: History, no behavioral disturbance noted. INITIAL H AND P AND ICU COURSE:  Angelo Garcia is a [de-identified]year old  female admitted to [facility] ICU 10/26/2022 with chief complaint of hypotension. Patient has a past medical history significant for lifetime nonsmoker, ASHD s/p CABG 1994, Essential HPTN, Atrial Fibrillation, Dyslipidemia, OAB, DMT2, CKD stage 3 (Crea 3/2022 1.4), ? left nephrectomy, Dementia, Morbid Obesity.    Resident of [facility]. Grupo Arvizu presented to [facility] ER via EMS with chief complaint of lethargy onset over the last 2 days. Reportedly patient has been declining recently and has not been eating and drinking well. On arrival patient awakens only to calling out her name and shaking her arm. Patient did recognize her  who is at bedside otherwise patient answers \"I do not know \". Patient was noted to be incontinent of stool on arrival.   states that he was not able to get her to respond to him and she refuses to eat and drink and refuses her home medications. Lactic acid was elevated at time of admission. Patient did receive 30 mL/kg of 0.9 normal saline fluid bolus. Persistent hypotension SBP 84-92 Levophed gtt was started. CTH and CT of ABD/PELVIS completed. Patient was admitted to [facility] ICU for further management care. Past Medical History:  see HPI. Family History: Mother  DMT2, Father  Cancer and CVA  Social History:  lifetime nonsmoker, denies any ETOH or illicit drug use. ROS   GENERAL: Positive for weakness, fatigue, poor appetite  SKN: Scattered all reddened spots nonraised on face chest and hands  HEAD: No headaches or recent injury  EYES: No drainage  EARS: No drainage  NOSE/THROAT: No rhinorrhea or pharyngitis, no nasal drainage  NECK: No lumps or unusual neck stiffness  PULMONARY: No dyspnea, orthopnea or paroxysmal nocturnal dyspnea, stridor, wheezing or cough  CARDIAC: Positive for hypotension  GI: Positive for abdominal pain, loose stool, no melena, hematochezia or hematemesis.    PERIPHERAL VASCULAR: No intermittent claudication or unusual leg cramps  MUSCULOSKELETAL: Occasional arthralgias, myalgias  NEUROLOGICAL: Positive for confusion, No Seizures or Syncope  HEMATOLOGIC:  No unusual bruising or bleeding  PSYCH: Positive for agitation    Scheduled Meds:  Continuous Infusions:   norepinephrine 9 mcg/min (10/26/22 0156)       PHYSICAL EXAMINATION:  T: 97.6.  P:  85. RR:  20. B/P:  99/29. FiO2:  room air. O2 Sat:  97.  I/O:  pending  Body mass index is 43.08 kg/m². GCS:   11  General:   Pale warm dry acutely ill in appearance  HEENT:  normocephalic and atraumatic. No scleral icterus. PERR  Neck: supple. No Thyromegaly. Lungs: clear to auscultation. No retractions  Cardiac: RRR. No JVD. Abdomen: soft, generalized pain, bowel sounds present  Extremities:  No clubbing, cyanosis, or edema x 4. Vasculature: capillary refill < 3 seconds. Palpable dorsalis pedis pulses. Skin:  warm and dry. Psych:  Alert and oriented x1. Affect flat  Lymph:  No supraclavicular adenopathy. Neurologic: Positive for confusion and lethargy. No focal deficit. No seizures. Data: (All radiographs, tracings, PFTs, and imaging are personally viewed and interpreted unless otherwise noted). 10/26/2022 0056 Sodium 134 Potassium 6.1 Chloride 103 HCO3 13  BUN/Crea 80/3.4  Lactic Acid 3.9-4.3  Glucose 114  Calcium 8.1  PCT 1.97  Troponin 0.060  Albumin 2.9, AST/ALT 23/12, Bilirubin 0.4  WBC 18.7 H/H 13.9/44.0,   Influenza A/B-Negative  COVID-19-Negative  Urine-Dk Yellow, Trace Ketones, Large Blood, 300 Protein, Large Leukocyte >200 WBC, >100 RBC, Many Bacteria, Turbid Character  10/26/2022 CTH-no acute intracranial hemorrhage or process  10/26/2022 CT ABD/PELVIS-Rectal wall thickening with perirectal inflammation consistent with nonspecific proctitis, Urinary bladder wall thickening with pericystic inflammation be reflective of cystitis. Mild RLL airspace disease. GB distended without gallstones. No free air or free fluid identified in the abdomen or pelvis. Chest Xray-no acute infiltrate  2020 ECHO LVEF 50%, mild global hypokinesis of the left ventricle, moderately dilated right ventricle, moderate tricuspid regurgitation. EKG NSR with RBBB HR 86,  [id number]  Cardiac telemetry normal sinus rhythm        Seen with multidisciplinary ICU team yes.   Meets Continued ICU Level Care Criteria:    [x] Yes   [] No - Transfer Planned to listed location:  [] HOSPITALIST CONTACTED-      Case and plan discussed with Dr. Stef Dias. Electronically signed by NEELIMA Kern - [facility]  CRITICAL CARE SPECIALIST   Patient seen by me including key components of medical care. Case discussed with resident physician. Patient is confused but complaining of left sided chest pain. EKG shows lateral STEMI with ST segment elevation of 3mm at V5 and V6. Hyperkalemia being actively treated. STEMI called. Dr. Alexis Davidson informed. Cath lab here to take patient to cath lab. Italicized font, if present,  represents changes to the note made by me. CC time 35 minutes. Time was discontiguous. Time does not include procedure. Time does include my direct assessment of the patient and coordination of care. Time represents more than 50% of the time involved with patient care by the [address] [place] [address] team.  Electronically signed by Tereso Torres.  Stef Dias MD.

## 2022-10-26 NOTE — PROCEDURES
.Arterial Line:    Indication: BP management on multiple high dose pressors     Complications:  none    EBL:  <5cc    Procedure:  After informed consent was obtained, the seldinger approach was utilized. Arterial pulsation was identified. Patient subsequently was prepped and draped in sterile fashion utilizing chlorhexidine prep, sterile gown, sterile gloves, mask, hair net, and sterile whole-body drape. Patient received 2 cc of local anesthesia with lidocaine. Introducer needle was advanced subcutaneously until arterial flow was obtained. Utilizing modified Seldinger technique, guidewire was placed through the introducer needle. Introducer needle was withdrawn leaving the guidewire in place. Dilator was placed over the guidewire and removed. Arterial catheter was placed into the lumen. Guidewire removed. Secondary cleansing at the site was obtained with chlorhexidine. Catheter was secured to the skin utilizing suture. This procedure was proctored by Hannah Rhodes. Electronically signed by Marc Mack DO on 10/26/2022 at 10:24 AM    I was at the bedside during the entire procedure, I assisted Dr. Bernice Curtis with arterial line placement. Electronically signed by Anna Harrell.  Will Fox CNP on 10/26/2022 at 11:01 AM

## 2022-10-26 NOTE — CARE COORDINATION
10/26/22, 11:45 AM EDT  Discharge Planning Evaluation  Social work consult received, patient from ECF. Husbands preference is to return [facility]. The patient's current payor source at the facility is Medicaid. Medicare skilled days available: n/a  Insurance precert:  YES  Spoke with Manohar Gutierrez at the facility. Patient bed hold: YES, Medicaid bed hold. Anticipated transport plan: Will need transportation. Do they require COVID 19 test to return to ECF: YES  Is there a required time frame which which COVID test needs done:48 hrs prior to pt returning. Patient's Healthcare Decision Maker: Legal [facility] states pt is a long term resident and is known as being somewhat non compliant. Braxton Azul states [facility] would like precert started and then can send pt back prior to results coming back.

## 2022-10-26 NOTE — BRIEF OP NOTE
[address] [address]  Sedation/Analgesia Post Sedation Record        Pt Name: Theresa Gamez  MRN: [id number]  YOB: 1940  Procedure Performed By: Sherin Augustin MD MD, [address]  Primary Care Physician: Eunice Jo MD    POST-PROCEDURE    Sedation/Anesthesia:  Local Anesthesia and IV Conscious Sedation with continuous O2 monitoring    Estimated Blood Loss: 10 cc     Specimens Removed:  [x]None []Other:      Disposition of Specimen:  []Pathology []Other        Complications:   [x]None Immediate []Other:       Procedure performed: Left heart cath    Post Procedure Diagnosis/Findings:  Patent LIMA to LAD, Occluded SVG (Unsure where it was bypassed to)  LVEDP 28 mm Hg (Lasix 20mg given)  Only 60-70 cc of contrast given  Moderate to severe diffuse disease of LCx,  Mild to mod diffuse disease of RCA  Small vessel disease       Recommendations:    Continue medical management  Monitor groin access site              Sherin Augustin MD MD, [address]  Electronically signed 10/26/2022 at 7:44 AM

## 2022-10-26 NOTE — PROGRESS NOTES
Patient arrived to unit from ED via stretcher. Patient transferred to ICU bed and placed on continuous ICU bedside monitor. Patient admitted for Dehydration [E86.0]  Septicemia ([facility] [address] [A41.9]  Generalized abdominal pain [R10.84]  Acute cystitis with hematuria [N30.01]  Septic shock ([facility] [address] [A41.9, R65.21]  Altered mental status, unspecified altered mental status type [R41.82]. Vitals obtained. See flowsheets. Patient's IV access includes R hand PIV, L Fem CVC. Current infusions and rates of infusion include Levo @ 21 mcg, 0.9 @ 200 ml/hr. Assessment completed by Stef Arroyo. Two nurse skin assessment completed by Stef Arroyo and REX Khan. See flowsheets for assessment details. Policies and procedures of ICU unable to be explained to patient at this time. Family member(s)/representative(s) present at time of admission include N/A. Patient rights explained to family member(s)/representatives and patient, as able. Patient/patient's family member(s)/representative(s) N/A to have physician notified of their admission. All questions posed by patient's family member(s)/representative(s) and patient answered at this time.

## 2022-10-27 NOTE — PROGRESS NOTES
IV team to room to obtain consent for PICC placement. Discussed with primary RN who states to hold line placement at this time as family is considering withdraw of care. Viviana Millard RN to reach out to IV team today or tomorrow regarding plan of care. IV team to follow.

## 2022-10-27 NOTE — FLOWSHEET NOTE
1220- this RN discussed with patient's , John Hogue that patient is on life support and that the blood pressure medications she's on is life support. He did not understand that the three vasopressors the patient was on would be considered life support. This RN explained that if any of these medications were to be shut off the patient would not longer survive. The  understand. 200- Ray decided to shut off the dialysis and all of the patient's medications and let her die peacefully. Condolences were offered. Resident MD notified patient's code status changed to DNR CC.    7928- patient's life support turned off. Tissues offered, patient's family at bedside. 0015- this RN and Gm Ambrocio RN listened for heart tones. No heart tones heard. Patent asystole on the monitor. Condolences offered to family. 7920- Resident notified of TOD as well as house supervisor.

## 2022-10-27 NOTE — PROGRESS NOTES
[facility]   Nephrology progress note  10/27/2022, 7:50 AM      Pt Name:    Irma Louie  MRN:     [id number]     YOB: 1940  Admit Date:    10/25/2022  5:51 PM    Chief Complaint: Nephrology following for DHAVAL    Subjective:  Patient was seen and examined this morning on CRRT. On multiple pressors, levo, vaso and did require Vidal and Epi overnight. Lactic acid is worsening. Minimal urine output. Keeping net even with CRRT. On bicarb drip. Objective:  24HR INTAKE/OUTPUT:    Intake/Output Summary (Last 24 hours) at 10/27/2022 0750  Last data filed at 10/27/2022 0732  Gross per 24 hour   Intake 58922.45 ml   Output 6210 ml   Net 6028.45 ml         I/O last 3 completed shifts:   In: 24437.1 [I.V.:48519.9; IV Piggyback:2710.2]  Out: 3369 [Urine:137]  I/O this shift:  In: 360.7 [I.V.:279.3; IV Piggyback:81.4]  Out: -    Admission weight: 228 lb (103.4 kg)  Wt Readings from Last 3 Encounters:   10/27/22 241 lb 6.5 oz (109.5 kg)   02/28/20 256 lb (116.1 kg)   04/09/19 235 lb (106.6 kg)        Vitals :   Vitals:    10/27/22 0630 10/27/22 0645 10/27/22 0700 10/27/22 0715   BP:       Pulse: 99 96 90 95   Resp: 19 18 16 18   Temp:       TempSrc:       SpO2:  96% 96% 97%   Weight:       Height:           Physical examination  General Appearance: eyes open but minimally responsive  Mouth/Throat: Oral mucosa moist  Neck: No JVD  Lungs: diminished breath sounds  Heart:  S1, S2 heard  GI: soft, non-tender  Extremities: mild edema in legs    Medications:  Infusion:    EPINEPHrine Stopped (10/27/22 0457)    heparin (PORCINE) Infusion      norepinephrine 70 mcg/min (10/27/22 0646)    sodium chloride      sodium bicarbonate infusion 250 mL/hr at 10/27/22 0444    dextrose      sodium chloride      amiodarone 0.5 mg/min (10/27/22 0443)    vasopressin (Septic Shock) infusion 0.04 Units/min (10/26/22 2137)    prismaSATE BGK 4/2.5 2,000 mL/hr at 10/27/22 0538    prismaSol BGK 4/2.5 1,000 mL/hr at 10/27/22 0402    prismaSol BGK 4/2.5 1,000 mL/hr at 10/27/22 0402    phenylephrine (EGRA-SYNEPHRINE) 50mg/250mL infusion Stopped (10/27/22 0652)    insulin 7.9 Units/hr (10/27/22 0716)     Meds:    heparin (porcine)  60 Units/kg IntraVENous Once    sodium chloride flush  5-40 mL IntraVENous 2 times per day    cefTRIAXone (ROCEPHIN) IV  2,000 mg IntraVENous Q24H    sodium chloride flush  5-40 mL IntraVENous 2 times per day    calcium replacement protocol   Other RX Placeholder     Meds prn: heparin (porcine), heparin (porcine), sodium chloride flush, sodium chloride, ondansetron **OR** ondansetron, polyethylene glycol, [DISCONTINUED] acetaminophen **OR** acetaminophen, glucose, dextrose bolus **OR** dextrose bolus, glucagon (rDNA), dextrose, sodium chloride flush, sodium chloride, acetaminophen, sodium chloride, potassium chloride, magnesium sulfate, calcium gluconate **OR** calcium gluconate **OR** calcium gluconate **OR** calcium gluconate, sodium phosphate IVPB **OR** sodium phosphate IVPB **OR** sodium phosphate IVPB **OR** sodium phosphate IVPB, insulin regular     Lab Data :  CBC:   Recent Labs     10/26/22  1730 10/26/22  2358 10/27/22  0546   WBC 13.5* 12.8* 14.3*   HGB 10.5* 10.4* 10.3*   HCT 35.9* 34.9* 35.2*    117* 81*     CMP:  Recent Labs     10/26/22  1730 10/26/22  2358 10/27/22  0546    138 137   K 4.6 4.8 4.6   CL 98 98 96*   CO2 14* 14* 14*   BUN 42* 23* 15   CREATININE 2.0* 1.2 1.1   GLUCOSE 274* 145* 124*   CALCIUM 7.5* 7.4* 7.5*   MG 3.3* 3.0* 2.7*   PHOS 3.5 2.7 1.9*     Hepatic:   Recent Labs     10/26/22  0056   LABALBU 2.9*   AST 23   ALT 12   BILITOT 0.4   ALKPHOS 61         Assessment and Plan:   DHAVAL due to ATN from septic shock and contrast nephropathy  -requiring CRRT, having high pressor requirements and worsening lactic acidosis  -continue bicarb drip  -continue with CRRT current settings, she is getting good clearance as creat has improved and phos is low so increasing her clearance more will likely not help with the worsening lactic acidosis   -replace lytes per protocol  2. Metabolic acidosis/lactic acidosis: worsening. Continue bicarb drip, CRRT as above  3. Septic Shock  4. UTI  5. Solitary kidney  6. AFib  7. EKG changes s/p LHC, no intervention needed  8. Anemia, thrombocytopenia    Poor prognosis  D/W  SAWYER Mayorga, DO  [facility]    This report has been created using voice recognition software.  It may contain minor errors which are inherent in voice recognition technology

## 2022-10-27 NOTE — DISCHARGE SUMMARY
CRITICAL CARE DISCHARGE SUMMARY NOTE      Patient:  Arik Galloway    ON/[facility]:[id number]  YOB: 1940  MRN: [id number]   PCP: Abbey Justice MD  Date of Admission: 10/25/2022  Chief Complaint:- Hypotension    Assessment and Plan:    Severe Septic Shock: Patient continues to have escalated pressor demands. Likely decompensated heart failure given diffuse vessel disease on 10/26 heart cath and 10/26 Echo with EF 20-25% with severe global hypokinesis and EKG changes which is a complication of the shock. Family meeting was held and the decision was made by spouse to pursue comfort care. Code status changed, 2 mg IV morphine PRN q2 hours ordered. Patient decompensated due to complications of shock. Time of death 12. Complicated UTI:  noted. Likely source of sepsis. Acute on Chronic Kidney Injury:  noted  Acute Hyperkalemia:  noted  Mild Hyponatremia:  Resolved before comfort care initiated. Elevated Troponin, STEMI concern:  noted  PAF:  noted  Dementia:  Noted. INITIAL H AND P AND ICU COURSE:  Elige Essex is a [de-identified]year old  female admitted to [facility] ICU 10/26/2022 with chief complaint of hypotension. Patient has a past medical history significant for lifetime nonsmoker, ASHD s/p CABG 1994, Essential HPTN, Atrial Fibrillation, Dyslipidemia, OAB, DMT2, CKD stage 3 (Crea 3/2022 1.4), ? left nephrectomy, Dementia, Morbid Obesity. Resident of [facility]. Necia Brunner presented to [facility] ER via EMS with chief complaint of lethargy onset over the last 2 days. Reportedly patient has been declining recently and has not been eating and drinking well. On arrival patient awakens only to calling out her name and shaking her arm. Patient did recognize her  who is at bedside otherwise patient answers \"I do not know \".   Patient was noted to be incontinent of stool on arrival.   states that he was not able to get her to respond to him and she refuses to eat and drink and refuses her home medications. Lactic acid was elevated at time of admission. Patient did receive 30 mL/kg of 0.9 normal saline fluid bolus. Persistent hypotension SBP 84-92 Levophed gtt was started. CTH and CT of ABD/PELVIS completed. Patient was admitted to [facility] ICU for further management care. 10/26 - EKG suggestive of  STEMI with elevation in leads 4 5 and 6 in AM. Dr. Ann Marie Quiroga was contacted and STEMI alert was paged. Patient taken to Cath lab, found to have diffuse RCA, Lcx, and small vessel disease. Back at ICU, patient was started on Bicarb for metabolic acidosis and CRRT for oliguric DHAVAL, nephrology is following. Her pressor demands escalated on 70 mcg/min phenylephrine 58 mcg/min levophed, 0.04 units/min Vasopressin. Treating for sepsis shock secondary to UTI with IV Rocephin. 10/27 - Overnight pressor demand continue to escalate 1 mcg of Epinephrine, 80 mcg of Levophed, 150 mcg of Phenylephrine. Temperature dropped to 93. 6F and bear-hugger was placed to warm. Patient is less responsive, GCS down from 13 to 7.    10/27 Significant event - After family meeting it was decided to pursue comfort care measures. Comfort care was initiated. PRN IV morphine 2 mg q2 hr ordered. Past Medical History:  Hx of MI s/p CABG , HTN, A. Fib, HLD, DM-II, CKD stage 3, left nephrectomy, dementia, obesity. Family History:  DM-II (mother, ), Cancer and CVA (father, ). Social History:  , non-smoker, no drug use.     ROS (from [facility])  GENERAL: Positive for weakness, fatigue, poor appetite  SKN: Scattered all reddened spots nonraised on face chest and hands  HEAD: No headaches or recent injury  EYES: No drainage  EARS: No drainage  NOSE/THROAT: No rhinorrhea or pharyngitis, no nasal drainage  NECK: No lumps or unusual neck stiffness  PULMONARY: No dyspnea, orthopnea or paroxysmal nocturnal dyspnea, stridor, wheezing or cough  CARDIAC: Positive for hypotension  GI: Positive for abdominal pain, loose stool, no melena, hematochezia or hematemesis. PERIPHERAL VASCULAR: No intermittent claudication or unusual leg cramps  MUSCULOSKELETAL: Occasional arthralgias, myalgias  NEUROLOGICAL: Positive for confusion, No Seizures or Syncope  HEMATOLOGIC:  No unusual bruising or bleeding  PSYCH: Positive for agitation    Scheduled Meds:   piperacillin-tazobactam  3,375 mg IntraVENous Q8H    sodium chloride flush  5-40 mL IntraVENous 2 times per day     Continuous Infusions:   heparin (PORCINE) Infusion Stopped (10/27/22 1238)    norepinephrine Stopped (10/27/22 1240)    sodium chloride      dextrose      amiodarone Stopped (10/27/22 1240)    vasopressin (Septic Shock) infusion Stopped (10/27/22 1240)    phenylephrine (GERA-SYNEPHRINE) 50mg/250mL infusion Stopped (10/27/22 1240)    insulin Stopped (10/27/22 1240)       PHYSICAL EXAMINATION:  T:  93.7  P:  83. RR:  16. B/P:  98/59. FiO2:  None. O2 Sat:  99%. I/O:  +1570 net  Body mass index is 45.61 kg/m². GCS:   7    General: Ill appearing, obese, elderly female, somnolent, agonal breathing  HEENT: normocephalic and atraumatic. No scleral icterus. Neck: supple. No Thyromegaly. Lungs: clear to auscultation. No retractions  Cardiac: Sinus Tachycardia. No JVD. Abdomen: soft. No organomegaly. Extremities:  No clubbing, cyanosis. Minimal LE Edema. Vasculature: capillary refill < 3 seconds. Palpable dorsalis pedis pulses. Skin:  warm (after warming) and dry. Psych:  Unable to assess. Lymph:  No supraclavicular adenopathy. Neurologic: No seizures. Disoriented, minimally responsive. Data: (All radiographs, tracings, PFTs, and imaging are personally viewed and interpreted unless otherwise noted).    , K 4.6, Cl 96, CO2 14, BUN 15, Cr 1.1, Ca 7.5  LA 18.0, ProCal 1.97, Glucose 124  WBC 14.3, H/H 10.3/35.2, Platelets 81  Telemetry shows: Sinus Rhythm with episodes of tachycardia in 100-110 range  Micro: 10/25 Blood Cx without growth x 24 hrs, additional urine Cx and repeat blood pending     Seen with multidisciplinary ICU team.  Meets Continued ICU Level Care Criteria:    [x] Yes   [] No - Transfer Planned to listed location:  [] HOSPITALIST CONTACTED-      Time of death noted: 8669    Case and plan discussed with Dr. Gary Villarreal. DATE of DEATH is 10/27/22      Electronically signed by Jerry Serrano DO  CRITICAL CARE SPECIALIST   Patient seen by me including key components of medical care. Case discussed with resident physician. Patient critically ill. Patient requiring pressors and dialysis. Patient managed with antibiotics. Family has opted to withdraw care. Italicized font, if present,  represents changes to the note made by me. CC time 35 minutes. Time was discontiguous. Time does not include procedure. Time does include my direct assessment of the patient and coordination of care. Time represents more than 50% of the time involved with patient care by the [facility] team.  Electronically signed by Jewell Fitzgerald.  Gary Villarreal MD.

## 2022-10-27 NOTE — PROGRESS NOTES
CRITICAL CARE PROGRESS NOTE      Patient:  Tianna Reilly    YTGF/[id number]  YOB: 1940  MRN: [id number]   PCP: Se Masterson MD  Date of Admission: 10/25/2022  Chief Complaint:- Hypotension    Assessment and Plan:    Severe Septic Shock:  4/4 SIRS with initial LA around 4 now jumping 18. ProCAL 1.97. Pressor requirements have escalated, last night they were: 1 mcg Epinephrine, 150 mcg phenylephrine, 80 mcg Levophed. We will continue levophed and go back to using vasopressin. There is a concern for Cardiogenic shock given diffuse vessel disease on 10/26 heart cath and 10/26 Echo with EF 20-25% with severe global hypokinesis and EKG changes. Blood cultures 10/25 are without growth 24 hours. Pedal pulses are palpable. Repeating ProCal, following blood gas. Will consider escalating antibiotic therapy to zosyn based as indicated on repeat labs and cultures results. Complicated UTI:  / Urinalysis suggestive of UTI with leuk esterases, WBCs, and many bacteria. Hx of OAB and 22 UTI with Proteus mirabilis sensitive to ceftriaxone. 10/25 CT abd/pelvis demonstrated pericystic inflammation suggestive of cystitis. Urine Cultures pending, will escalate from rocephin to zosyn as indicated. Acute on Chronic Kidney Injury:  EMR reflects baseline Cr of 1.4 since 2022, hx of left nephrectomy per . 10/26 Cr max of 3.5, CRRT initiated given hypotension to severe for HD, Cr 10/27 improved to 1.1. Likely hypotension mediated pre-renal etiology. Nephrology following. Avoiding nephrotoxic agents. Acute Hyperkalemia:  10/26 K max was 6.1, CRRT initiated, 10/27 K improved to 4.6. Will monitor. Mild Hyponatremia:  Sodium 134 10/26, Resolved. Elevated Troponin, STEMI concern:  Elevation likely secondary to sepsis or demand ischemia, however given this elevation and ST changes 10/26 STEMI alert called and patient sent to Cath Lab.  Cath suggestive of chronic vessel disease without acute occulusion. Cardiology recommended medical management. Heparin gtt ACS protocol ordered. PAF:  noted hx, monitor on Telemetry, on amiodarone gtt. Will anticoagulate with heparin. Dementia:  Noted. Cannot assess behavioral status at this time. INITIAL H AND P AND ICU COURSE:  Flaquita Barrios is a [de-identified]year old  female admitted to [facility] ICU 10/26/2022 with chief complaint of hypotension. Patient has a past medical history significant for lifetime nonsmoker, ASHD s/p CABG 1994, Essential HPTN, Atrial Fibrillation, Dyslipidemia, OAB, DMT2, CKD stage 3 (Crea 3/2022 1.4), ? left nephrectomy, Dementia, Morbid Obesity. Resident of [facility] presented to [facility] ER via EMS with chief complaint of lethargy onset over the last 2 days. Reportedly patient has been declining recently and has not been eating and drinking well. On arrival patient awakens only to calling out her name and shaking her arm. Patient did recognize her  who is at bedside otherwise patient answers \"I do not know \". Patient was noted to be incontinent of stool on arrival.   states that he was not able to get her to respond to him and she refuses to eat and drink and refuses her home medications. Lactic acid was elevated at time of admission. Patient did receive 30 mL/kg of 0.9 normal saline fluid bolus. Persistent hypotension SBP 84-92 Levophed gtt was started. CTH and CT of ABD/PELVIS completed. Patient was admitted to [facility] ICU for further management care. 10/26 - EKG suggestive of  STEMI with elevation in leads 4 5 and 6 in AM. Dr. Kwadwo Calvillo was contacted and STEMI alert was paged. Patient taken to Cath lab, found to have diffuse RCA, Lcx, and small vessel disease. Back at ICU, patient was started on Bicarb for metabolic acidosis and CRRT for oliguric DHAVAL, nephrology is following. Her pressor demands escalated on 70 mcg/min phenylephrine 58 mcg/min levophed, 0.04 units/min Vasopressin. Treating for sepsis shock secondary to UTI with IV Rocephin. 10/27 - Overnight pressor demand continue to escalate 1 mcg of Epinephrine, 80 mcg of Levophed, 150 mcg of Phenylephrine. Temperature dropped to 93. 6F and bear-hugger was placed to warm. Patient is less responsive, GCS down from 13 to 7. Past Medical History:  Hx of MI s/p CABG , HTN, A. Fib, HLD, DM-II, CKD stage 3, left nephrectomy, dementia, obesity. Family History:  DM-II (mother, ), Cancer and CVA (father, ). Social History:  , non-smoker, no drug use. ROS (from [facility])  GENERAL: Positive for weakness, fatigue, poor appetite  SKN: Scattered all reddened spots nonraised on face chest and hands  HEAD: No headaches or recent injury  EYES: No drainage  EARS: No drainage  NOSE/THROAT: No rhinorrhea or pharyngitis, no nasal drainage  NECK: No lumps or unusual neck stiffness  PULMONARY: No dyspnea, orthopnea or paroxysmal nocturnal dyspnea, stridor, wheezing or cough  CARDIAC: Positive for hypotension  GI: Positive for abdominal pain, loose stool, no melena, hematochezia or hematemesis.    PERIPHERAL VASCULAR: No intermittent claudication or unusual leg cramps  MUSCULOSKELETAL: Occasional arthralgias, myalgias  NEUROLOGICAL: Positive for confusion, No Seizures or Syncope  HEMATOLOGIC:  No unusual bruising or bleeding  PSYCH: Positive for agitation    Scheduled Meds:   sodium chloride flush  5-40 mL IntraVENous 2 times per day    heparin (porcine)  5,000 Units SubCUTAneous 3 times per day    cefTRIAXone (ROCEPHIN) IV  2,000 mg IntraVENous Q24H    aspirin  300 mg Rectal Once    sodium chloride flush  5-40 mL IntraVENous 2 times per day    calcium replacement protocol   Other RX Placeholder     Continuous Infusions:   EPINEPHrine 1 mcg/min (10/27/22 0437)    norepinephrine 80 mcg/min (10/27/22 0417)    sodium chloride      sodium bicarbonate infusion 250 mL/hr at 10/27/22 737    dextrose      sodium chloride      amiodarone 0.5 mg/min (10/27/22 0443)    vasopressin (Septic Shock) infusion 0.04 Units/min (10/26/22 2137)    prismaSATE BGK 4/2.5 2,000 mL/hr at 10/27/22 0255    prismaSol BGK 4/2.5 1,000 mL/hr at 10/27/22 0402    prismaSol BGK 4/2.5 1,000 mL/hr at 10/27/22 0402    phenylephrine (GERA-SYNEPHRINE) 50mg/250mL infusion 150 mcg/min (10/27/22 0348)    insulin 7.9 Units/hr (10/27/22 0349)       PHYSICAL EXAMINATION:  T:  93.7  P:  83. RR:  16. B/P:  98/59. FiO2:  None. O2 Sat:  99%. I/O:  +1570 net  Body mass index is 42.66 kg/m². GCS:   7    General: Ill appearing, obese, elderly female, somnolent   HEENT: normocephalic and atraumatic. No scleral icterus. Neck: supple. No Thyromegaly. Lungs: clear to auscultation. No retractions  Cardiac: Sinus Tachycardia. No JVD. Abdomen: soft. No organomegaly. Extremities:  No clubbing, cyanosis. Minimal LE Edema. Vasculature: capillary refill < 3 seconds. Palpable dorsalis pedis pulses. Skin:  warm (after warming) and dry. Psych:  Unable to assess. Lymph:  No supraclavicular adenopathy. Neurologic: No seizures. Disoriented, minimally responsive. Data: (All radiographs, tracings, PFTs, and imaging are personally viewed and interpreted unless otherwise noted). , K 4.6, Cl 96, CO2 14, BUN 15, Cr 1.1, Ca 7.5  LA 18.0, ProCal 1.97, Glucose 124  WBC 14.3, H/H 10.3/35.2, Platelets 81  Telemetry shows: Sinus Rhythm with episodes of tachycardia in 100-110 range  Micro: 10/25 Blood Cx without growth x 24 hrs, additional urine Cx and repeat blood pending       Seen with multidisciplinary ICU team.  Meets Continued ICU Level Care Criteria:    [x] Yes   [] No - Transfer Planned to listed location:  [] HOSPITALIST CONTACTED-      Case and plan discussed with Dr. Adeola Vora. Electronically signed by [employer], DO  CRITICAL CARE SPECIALIST   Patient seen by me including key components of medical care. Case discussed with resident physician. .  Italicized font, if present,  represents changes to the note made by me. CC time 35 minutes. Time was discontiguous. Time does not include procedure. Time does include my direct assessment of the patient and coordination of care. Time represents more than 50% of the time involved with patient care by the [address] [place] [address] team.  Electronically signed by Alberto Rutledge.  Fredy Mirza MD.

## 2022-10-27 NOTE — PROGRESS NOTES
0010 Levo increased to 100mcg per Angela Woodall, DNP  [address] reading CO 3.6 and CI 1.8  0031 Dr Partha Burns updated on patient status and pressor requirements. No new orders.

## 2022-10-28 NOTE — CARE COORDINATION
10/28/22, 9:54 AM EDT    DISCHARGE PLANNING EVALUATION     Sw notified Han Lloyd at [facility] of patient passing away.

## 2022-10-29 LAB
ORGANISM: ABNORMAL
ORGANISM: ABNORMAL
URINE CULTURE, ROUTINE: ABNORMAL
URINE CULTURE, ROUTINE: ABNORMAL

## 2022-10-30 LAB
BLOOD CULTURE, ROUTINE: NORMAL
BLOOD CULTURE, ROUTINE: NORMAL

## (undated) DEVICE — SUTURE SZ 0 27IN 5/8 CIR UR-6  TAPER PT VIOLET ABSRB VICRYL J603H

## (undated) DEVICE — ARM DRAPE

## (undated) DEVICE — Device

## (undated) DEVICE — SPONGE LAP W18XL18IN WHT COT 4 PLY FLD STRUNG RADPQ DISP ST

## (undated) DEVICE — SOLUTION ANTIFOG VIS SYS CLEARIFY LAPSCP

## (undated) DEVICE — TIP COVER ACCESSORY

## (undated) DEVICE — SUREFIT, DUAL DISPERSIVE ELECTRODE, CONTACT QUALITY MONITOR: Brand: SUREFIT

## (undated) DEVICE — PACK PROCEDURE SURG SET UP SRMC

## (undated) DEVICE — BASIC SINGLE BASIN BTC-LF: Brand: MEDLINE INDUSTRIES, INC.

## (undated) DEVICE — GLOVE ORANGE PI 7 1/2   MSG9075

## (undated) DEVICE — CORE TRUMPET FOR SINGLE SOLUTION BAG: Brand: CORE DYNAMICS

## (undated) DEVICE — BLADELESS OBTURATOR: Brand: WECK VISTA

## (undated) DEVICE — GLOVE ORANGE PI 7   MSG9070

## (undated) DEVICE — AGENT HEMSTAT 3GM PURIFIED PLNT STARCH PWD ABSRB ARISTA AH

## (undated) DEVICE — 3M™ BAIR HUGGER® MULTI ACCESS BLANKET, PEDIATRIC, FULL BODY, 10 PER CASE 31000: Brand: BAIR HUGGER™

## (undated) DEVICE — 35 ML SYRINGE LUER-LOCK TIP: Brand: MONOJECT

## (undated) DEVICE — INSUFFLATION NEEDLE TO ESTABLISH PNEUMOPERITONEUM.: Brand: INSUFFLATION NEEDLE

## (undated) DEVICE — COVER ARMBRD W13XL28.5IN IMPERV BLU FOR OP RM

## (undated) DEVICE — INTENDED FOR TISSUE SEPARATION, AND OTHER PROCEDURES THAT REQUIRE A SHARP SURGICAL BLADE TO PUNCTURE OR CUT.: Brand: BARD-PARKER ® CARBON RIB-BACK BLADES

## (undated) DEVICE — 3M™ TEGADERM™ TRANSPARENT FILM DRESSING FRAME STYLE, 1624W, 2-3/8 IN X 2-3/4 IN (6 CM X 7 CM), 100/CT 4CT/CASE: Brand: 3M™ TEGADERM™

## (undated) DEVICE — ELECTRO LUBE IS A SINGLE PATIENT USE DEVICE THAT IS INTENDED TO BE USED ON ELECTROSURGICAL ELECTRODES TO REDUCE STICKING.: Brand: KEY SURGICAL ELECTRO LUBE

## (undated) DEVICE — TRAY CATH 16FR F INCLUDE LUB DRNGE BG STATLOK STBL DEV

## (undated) DEVICE — PAD,EYE,1-5/8X2 5/8,STERILE,LF,1/PK: Brand: MEDLINE

## (undated) DEVICE — AGENT HEMSTAT W2XL14IN OXIDIZED REGENERATED CELOS ABSRB FOR

## (undated) DEVICE — SOLUTION IV 1000ML 0.9% SOD CHL PH 5 INJ USP VIAFLX PLAS

## (undated) DEVICE — YANKAUER,BULB TIP,W/O VENT,RIGID,STERILE: Brand: MEDLINE

## (undated) DEVICE — TRI-LUMEN FILTERED TUBE SET WITH ACTIVATED CHARCOAL FILTER: Brand: AIRSEAL

## (undated) DEVICE — COLUMN DRAPE

## (undated) DEVICE — APPLICATOR SURG XL L38CM FOR ARISTA ABSRB HEMSTAT FLEXITIP

## (undated) DEVICE — CANISTER, RIGID, 2000CC: Brand: MEDLINE INDUSTRIES, INC.

## (undated) DEVICE — GAUZE,SPONGE,4"X4",12PLY,STERILE,LF,2'S: Brand: MEDLINE

## (undated) DEVICE — PACK,UNIVERSAL,NO GOWNS: Brand: MEDLINE

## (undated) DEVICE — 3M™ STERI-DRAPE™ INSTRUMENT POUCH 1018: Brand: STERI-DRAPE™

## (undated) DEVICE — CANNULA SEAL

## (undated) DEVICE — BANDAGE COBAN 4 IN COMPR W4INXL5YD FOAM COHESIVE QUIK STK SELF ADH SFT

## (undated) DEVICE — Z DUP USE 2641840 CLIP INT L POLYMER LOK LIG HEM O LOK

## (undated) DEVICE — GOWN,SIRUS,NONRNF,SETINSLV,XL,20/CS: Brand: MEDLINE

## (undated) DEVICE — SOLUTION IV IRRIG WATER 1000ML POUR BRL 2F7114

## (undated) DEVICE — AIRSEAL 12 MM ACCESS PORT AND PALM GRIP OBTURATOR WITH BLADELESS OPTICAL TIP, 120 MM LENGTH: Brand: AIRSEAL

## (undated) DEVICE — HYPODERMIC SAFETY NEEDLE: Brand: MAGELLAN

## (undated) DEVICE — SYRINGE MED 10ML LUERLOCK TIP W/O SFTY DISP

## (undated) DEVICE — CHLORAPREP 26ML ORANGE

## (undated) DEVICE — TUBING, SUCTION, 1/4" X 20', STRAIGHT: Brand: MEDLINE INDUSTRIES, INC.

## (undated) DEVICE — GAUZE,SPONGE,2"X2",8PLY,STERILE,LF,2'S: Brand: MEDLINE

## (undated) DEVICE — 3M™ TEGADERM™ TRANSPARENT FILM DRESSING FRAME STYLE, 1626W, 4 IN X 4-3/4 IN (10 CM X 12 CM), 50/CT 4CT/CASE: Brand: 3M™ TEGADERM™

## (undated) DEVICE — SUTURE V-LOC 180 SZ 3-0 L9IN ABSRB GRN L26MM V-20 1/2 CIR VLOCL0644

## (undated) DEVICE — GLOVE SURG SZ 65 THK91MIL LTX FREE SYN POLYISOPRENE

## (undated) DEVICE — GAUZE,SPONGE,8"X4",12PLY,XRAY,STRL,LF: Brand: MEDLINE

## (undated) DEVICE — SHEET, T, LAPAROTOMY, STERILE: Brand: MEDLINE